# Patient Record
Sex: FEMALE | Race: WHITE | Employment: FULL TIME | URBAN - NONMETROPOLITAN AREA
[De-identification: names, ages, dates, MRNs, and addresses within clinical notes are randomized per-mention and may not be internally consistent; named-entity substitution may affect disease eponyms.]

---

## 2017-11-25 ENCOUNTER — APPOINTMENT (OUTPATIENT)
Dept: CT IMAGING | Age: 37
DRG: 244 | End: 2017-11-25
Payer: COMMERCIAL

## 2017-11-25 ENCOUNTER — HOSPITAL ENCOUNTER (INPATIENT)
Age: 37
LOS: 2 days | Discharge: HOME | DRG: 244 | End: 2017-11-27
Attending: INTERNAL MEDICINE | Admitting: INTERNAL MEDICINE
Payer: COMMERCIAL

## 2017-11-25 DIAGNOSIS — R79.89 ELEVATED LACTIC ACID LEVEL: ICD-10-CM

## 2017-11-25 DIAGNOSIS — D72.829 LEUKOCYTOSIS, UNSPECIFIED TYPE: ICD-10-CM

## 2017-11-25 DIAGNOSIS — D73.89 SPLENIC LESION: ICD-10-CM

## 2017-11-25 DIAGNOSIS — K52.9 COLITIS: Primary | ICD-10-CM

## 2017-11-25 LAB
ADENOVIRUS F 40 41 PCR: NOT DETECTED
ALBUMIN SERPL-MCNC: 4.4 G/DL (ref 3.5–5.1)
ALP BLD-CCNC: 102 U/L (ref 38–126)
ALT SERPL-CCNC: 18 U/L (ref 11–66)
AMPHETAMINE+METHAMPHETAMINE URINE SCREEN: NEGATIVE
ANION GAP SERPL CALCULATED.3IONS-SCNC: 18 MEQ/L (ref 8–16)
AST SERPL-CCNC: 20 U/L (ref 5–40)
ASTROVIRUS PCR: NOT DETECTED
BARBITURATE QUANTITATIVE URINE: NEGATIVE
BASOPHILS # BLD: 0 %
BASOPHILS ABSOLUTE: 0 THOU/MM3 (ref 0–0.1)
BENZODIAZEPINE QUANTITATIVE URINE: NEGATIVE
BILIRUB SERPL-MCNC: 0.3 MG/DL (ref 0.3–1.2)
BILIRUBIN DIRECT: < 0.2 MG/DL (ref 0–0.3)
BILIRUBIN URINE: NEGATIVE
BLOOD, URINE: NEGATIVE
BUN BLDV-MCNC: 9 MG/DL (ref 7–22)
CALCIUM SERPL-MCNC: 9.9 MG/DL (ref 8.5–10.5)
CAMPYLOBACTER PCR: NOT DETECTED
CANNABINOID QUANTITATIVE URINE: NEGATIVE
CHARACTER, URINE: CLEAR
CHLORIDE BLD-SCNC: 99 MEQ/L (ref 98–111)
CLOSTRIDIUM DIFFICILE, PCR: NOT DETECTED
CO2: 21 MEQ/L (ref 23–33)
COCAINE METABOLITE QUANTITATIVE URINE: NEGATIVE
COLOR: YELLOW
CREAT SERPL-MCNC: 0.7 MG/DL (ref 0.4–1.2)
CRYPTOSPORIDIUM PCR: NOT DETECTED
CYCLOSPORA CAYETANENSIS PCR: NOT DETECTED
DIFFERENTIAL, MANUAL: NORMAL
E COLI 0157 PCR: NORMAL
E COLI ENTEROAGGREGATIVE PCR: NOT DETECTED
E COLI ENTEROPATHOGENIC PCR: NOT DETECTED
E COLI ENTEROTOXIGENIC PCR: NOT DETECTED
E COLI SHIGA LIKE TOXIN PCR: NOT DETECTED
E COLI SHIGELLA/ENTEROINVASIVE PCR: NOT DETECTED
E HISTOLYTICA GI FILM ARRAY: NOT DETECTED
EOSINOPHIL # BLD: 2 %
EOSINOPHILS ABSOLUTE: 0.4 THOU/MM3 (ref 0–0.4)
ETHYL ALCOHOL, SERUM: < 0.01 %
GFR SERPL CREATININE-BSD FRML MDRD: > 90 ML/MIN/1.73M2
GIARDIA LAMBLIA PCR: NOT DETECTED
GLUCOSE BLD-MCNC: 97 MG/DL (ref 70–108)
GLUCOSE URINE: NEGATIVE MG/DL
HCT VFR BLD CALC: 44.8 % (ref 37–47)
HEMOGLOBIN: 15.5 GM/DL (ref 12–16)
KETONES, URINE: NEGATIVE
LACTIC ACID: 0.7 MMOL/L (ref 0.5–2.2)
LACTIC ACID: 2.7 MMOL/L (ref 0.5–2.2)
LEUKOCYTE ESTERASE, URINE: NEGATIVE
LIPASE: 23.8 U/L (ref 5.6–51.3)
LYMPHOCYTES # BLD: 25 %
LYMPHOCYTES ABSOLUTE: 5.2 THOU/MM3 (ref 1–4.8)
MCH RBC QN AUTO: 30.3 PG (ref 27–31)
MCHC RBC AUTO-ENTMCNC: 34.7 GM/DL (ref 33–37)
MCV RBC AUTO: 87.4 FL (ref 81–99)
MONOCYTES # BLD: 5 %
MONOCYTES ABSOLUTE: 1 THOU/MM3 (ref 0.4–1.3)
NITRITE, URINE: NEGATIVE
NOROVIRUS GI GII PCR: NOT DETECTED
NUCLEATED RED BLOOD CELLS: 0 /100 WBC
OPIATES, URINE: POSITIVE
OSMOLALITY CALCULATION: 274.3 MOSMOL/KG (ref 275–300)
OXYCODONE: NEGATIVE
PDW BLD-RTO: 13.1 % (ref 11.5–14.5)
PH UA: 6.5
PHENCYCLIDINE QUANTITATIVE URINE: NEGATIVE
PLATELET # BLD: 309 THOU/MM3 (ref 130–400)
PLATELET ESTIMATE: ADEQUATE
PLESIOMONAS SHIGELLOIDES PCR: NOT DETECTED
PMV BLD AUTO: 9 MCM (ref 7.4–10.4)
POTASSIUM SERPL-SCNC: 3.5 MEQ/L (ref 3.5–5.2)
PROCALCITONIN: 0.08 NG/ML (ref 0.01–0.09)
PROTEIN UA: NEGATIVE
RBC # BLD: 5.12 MILL/MM3 (ref 4.2–5.4)
ROTAVIRUS A PCR: NOT DETECTED
SALMONELLA PCR: NOT DETECTED
SAPOVIRUS PCR: NOT DETECTED
SEG NEUTROPHILS: 68 %
SEGMENTED NEUTROPHILS ABSOLUTE COUNT: 14 THOU/MM3 (ref 1.8–7.7)
SODIUM BLD-SCNC: 138 MEQ/L (ref 135–145)
SPECIFIC GRAVITY, URINE: 1.01 (ref 1–1.03)
TOTAL PROTEIN: 7.9 G/DL (ref 6.1–8)
UROBILINOGEN, URINE: 0.2 EU/DL
VIBRIO CHOLERAE PCR: NOT DETECTED
VIBRIO PCR: NOT DETECTED
WBC # BLD: 20.6 THOU/MM3 (ref 4.8–10.8)
YERSINIA ENTEROCOLITICA PCR: NOT DETECTED

## 2017-11-25 PROCEDURE — 99223 1ST HOSP IP/OBS HIGH 75: CPT | Performed by: INTERNAL MEDICINE

## 2017-11-25 PROCEDURE — 96376 TX/PRO/DX INJ SAME DRUG ADON: CPT

## 2017-11-25 PROCEDURE — 83690 ASSAY OF LIPASE: CPT

## 2017-11-25 PROCEDURE — 96361 HYDRATE IV INFUSION ADD-ON: CPT

## 2017-11-25 PROCEDURE — 6360000002 HC RX W HCPCS: Performed by: PHYSICIAN ASSISTANT

## 2017-11-25 PROCEDURE — 1200000003 HC TELEMETRY R&B

## 2017-11-25 PROCEDURE — 96375 TX/PRO/DX INJ NEW DRUG ADDON: CPT

## 2017-11-25 PROCEDURE — 80307 DRUG TEST PRSMV CHEM ANLYZR: CPT

## 2017-11-25 PROCEDURE — G0480 DRUG TEST DEF 1-7 CLASSES: HCPCS

## 2017-11-25 PROCEDURE — 36415 COLL VENOUS BLD VENIPUNCTURE: CPT

## 2017-11-25 PROCEDURE — 87045 FECES CULTURE AEROBIC BACT: CPT

## 2017-11-25 PROCEDURE — 85025 COMPLETE CBC W/AUTO DIFF WBC: CPT

## 2017-11-25 PROCEDURE — 84145 PROCALCITONIN (PCT): CPT

## 2017-11-25 PROCEDURE — 6360000004 HC RX CONTRAST MEDICATION: Performed by: PHYSICIAN ASSISTANT

## 2017-11-25 PROCEDURE — 74177 CT ABD & PELVIS W/CONTRAST: CPT

## 2017-11-25 PROCEDURE — 2580000003 HC RX 258: Performed by: INTERNAL MEDICINE

## 2017-11-25 PROCEDURE — 6360000002 HC RX W HCPCS

## 2017-11-25 PROCEDURE — 80053 COMPREHEN METABOLIC PANEL: CPT

## 2017-11-25 PROCEDURE — 82248 BILIRUBIN DIRECT: CPT

## 2017-11-25 PROCEDURE — 87040 BLOOD CULTURE FOR BACTERIA: CPT

## 2017-11-25 PROCEDURE — 81003 URINALYSIS AUTO W/O SCOPE: CPT

## 2017-11-25 PROCEDURE — 83605 ASSAY OF LACTIC ACID: CPT

## 2017-11-25 PROCEDURE — 87427 SHIGA-LIKE TOXIN AG IA: CPT

## 2017-11-25 PROCEDURE — 6360000002 HC RX W HCPCS: Performed by: INTERNAL MEDICINE

## 2017-11-25 PROCEDURE — 99285 EMERGENCY DEPT VISIT HI MDM: CPT

## 2017-11-25 PROCEDURE — 6370000000 HC RX 637 (ALT 250 FOR IP): Performed by: INTERNAL MEDICINE

## 2017-11-25 PROCEDURE — 87507 IADNA-DNA/RNA PROBE TQ 12-25: CPT

## 2017-11-25 PROCEDURE — 6370000000 HC RX 637 (ALT 250 FOR IP): Performed by: PHYSICIAN ASSISTANT

## 2017-11-25 PROCEDURE — 2580000003 HC RX 258: Performed by: PHYSICIAN ASSISTANT

## 2017-11-25 PROCEDURE — 96374 THER/PROPH/DIAG INJ IV PUSH: CPT

## 2017-11-25 RX ORDER — IBUPROFEN 200 MG
400 TABLET ORAL EVERY 6 HOURS PRN
COMMUNITY

## 2017-11-25 RX ORDER — MORPHINE SULFATE 2 MG/ML
2 INJECTION, SOLUTION INTRAMUSCULAR; INTRAVENOUS ONCE
Status: COMPLETED | OUTPATIENT
Start: 2017-11-25 | End: 2017-11-25

## 2017-11-25 RX ORDER — 0.9 % SODIUM CHLORIDE 0.9 %
500 INTRAVENOUS SOLUTION INTRAVENOUS ONCE
Status: COMPLETED | OUTPATIENT
Start: 2017-11-25 | End: 2017-11-25

## 2017-11-25 RX ORDER — SODIUM CHLORIDE 9 MG/ML
INJECTION, SOLUTION INTRAVENOUS CONTINUOUS
Status: DISCONTINUED | OUTPATIENT
Start: 2017-11-25 | End: 2017-11-27

## 2017-11-25 RX ORDER — 0.9 % SODIUM CHLORIDE 0.9 %
1000 INTRAVENOUS SOLUTION INTRAVENOUS ONCE
Status: COMPLETED | OUTPATIENT
Start: 2017-11-25 | End: 2017-11-25

## 2017-11-25 RX ORDER — MORPHINE SULFATE 4 MG/ML
4 INJECTION, SOLUTION INTRAMUSCULAR; INTRAVENOUS ONCE
Status: COMPLETED | OUTPATIENT
Start: 2017-11-25 | End: 2017-11-25

## 2017-11-25 RX ORDER — ONDANSETRON 2 MG/ML
4 INJECTION INTRAMUSCULAR; INTRAVENOUS EVERY 6 HOURS PRN
Status: DISCONTINUED | OUTPATIENT
Start: 2017-11-25 | End: 2017-11-27 | Stop reason: HOSPADM

## 2017-11-25 RX ORDER — ACETAMINOPHEN 325 MG/1
650 TABLET ORAL EVERY 4 HOURS PRN
Status: DISCONTINUED | OUTPATIENT
Start: 2017-11-25 | End: 2017-11-27 | Stop reason: HOSPADM

## 2017-11-25 RX ORDER — SODIUM CHLORIDE 0.9 % (FLUSH) 0.9 %
10 SYRINGE (ML) INJECTION EVERY 12 HOURS SCHEDULED
Status: DISCONTINUED | OUTPATIENT
Start: 2017-11-25 | End: 2017-11-27 | Stop reason: HOSPADM

## 2017-11-25 RX ORDER — MORPHINE SULFATE 2 MG/ML
4 INJECTION, SOLUTION INTRAMUSCULAR; INTRAVENOUS
Status: ACTIVE | OUTPATIENT
Start: 2017-11-25 | End: 2017-11-26

## 2017-11-25 RX ORDER — ONDANSETRON 2 MG/ML
4 INJECTION INTRAMUSCULAR; INTRAVENOUS ONCE
Status: COMPLETED | OUTPATIENT
Start: 2017-11-25 | End: 2017-11-25

## 2017-11-25 RX ORDER — METRONIDAZOLE 500 MG/1
500 TABLET ORAL EVERY 8 HOURS SCHEDULED
Status: DISCONTINUED | OUTPATIENT
Start: 2017-11-25 | End: 2017-11-27 | Stop reason: HOSPADM

## 2017-11-25 RX ORDER — MORPHINE SULFATE 2 MG/ML
2 INJECTION, SOLUTION INTRAMUSCULAR; INTRAVENOUS
Status: DISPENSED | OUTPATIENT
Start: 2017-11-25 | End: 2017-11-26

## 2017-11-25 RX ORDER — LORAZEPAM 2 MG/ML
0.5 INJECTION INTRAMUSCULAR ONCE
Status: COMPLETED | OUTPATIENT
Start: 2017-11-25 | End: 2017-11-25

## 2017-11-25 RX ORDER — ONDANSETRON 2 MG/ML
INJECTION INTRAMUSCULAR; INTRAVENOUS
Status: COMPLETED
Start: 2017-11-25 | End: 2017-11-25

## 2017-11-25 RX ORDER — SODIUM CHLORIDE 0.9 % (FLUSH) 0.9 %
10 SYRINGE (ML) INJECTION PRN
Status: DISCONTINUED | OUTPATIENT
Start: 2017-11-25 | End: 2017-11-27 | Stop reason: HOSPADM

## 2017-11-25 RX ORDER — CIPROFLOXACIN 2 MG/ML
400 INJECTION, SOLUTION INTRAVENOUS EVERY 12 HOURS
Status: DISCONTINUED | OUTPATIENT
Start: 2017-11-25 | End: 2017-11-27

## 2017-11-25 RX ADMIN — CIPROFLOXACIN 400 MG: 2 INJECTION, SOLUTION INTRAVENOUS at 17:54

## 2017-11-25 RX ADMIN — SODIUM CHLORIDE 500 ML: 9 INJECTION, SOLUTION INTRAVENOUS at 18:00

## 2017-11-25 RX ADMIN — SODIUM CHLORIDE: 9 INJECTION, SOLUTION INTRAVENOUS at 17:54

## 2017-11-25 RX ADMIN — MORPHINE SULFATE 4 MG: 4 INJECTION, SOLUTION INTRAMUSCULAR; INTRAVENOUS at 11:07

## 2017-11-25 RX ADMIN — MORPHINE SULFATE 2 MG: 2 INJECTION, SOLUTION INTRAMUSCULAR; INTRAVENOUS at 14:37

## 2017-11-25 RX ADMIN — SODIUM CHLORIDE: 9 INJECTION, SOLUTION INTRAVENOUS at 23:56

## 2017-11-25 RX ADMIN — LORAZEPAM 0.5 MG: 2 INJECTION INTRAMUSCULAR; INTRAVENOUS at 11:08

## 2017-11-25 RX ADMIN — METRONIDAZOLE 500 MG: 500 TABLET, FILM COATED ORAL at 23:55

## 2017-11-25 RX ADMIN — ONDANSETRON 4 MG: 2 INJECTION INTRAMUSCULAR; INTRAVENOUS at 11:05

## 2017-11-25 RX ADMIN — Medication 15 ML: at 11:10

## 2017-11-25 RX ADMIN — TAZOBACTAM SODIUM AND PIPERACILLIN SODIUM 3.38 G: 375; 3 INJECTION, SOLUTION INTRAVENOUS at 15:02

## 2017-11-25 RX ADMIN — ENOXAPARIN SODIUM 40 MG: 40 INJECTION SUBCUTANEOUS at 17:58

## 2017-11-25 RX ADMIN — SODIUM CHLORIDE 500 ML: 9 INJECTION, SOLUTION INTRAVENOUS at 11:10

## 2017-11-25 RX ADMIN — IOPAMIDOL 80 ML: 755 INJECTION, SOLUTION INTRAVENOUS at 13:15

## 2017-11-25 ASSESSMENT — PAIN SCALES - GENERAL
PAINLEVEL_OUTOF10: 4
PAINLEVEL_OUTOF10: 10
PAINLEVEL_OUTOF10: 1
PAINLEVEL_OUTOF10: 10
PAINLEVEL_OUTOF10: 2
PAINLEVEL_OUTOF10: 2

## 2017-11-25 ASSESSMENT — ENCOUNTER SYMPTOMS
SHORTNESS OF BREATH: 0
VOMITING: 0
RHINORRHEA: 0
BACK PAIN: 0
EYE DISCHARGE: 0
DIARRHEA: 1
SORE THROAT: 0
ABDOMINAL PAIN: 1
BLOOD IN STOOL: 1
WHEEZING: 0
EYE PAIN: 0
COUGH: 0
NAUSEA: 0

## 2017-11-25 ASSESSMENT — PAIN DESCRIPTION - DESCRIPTORS
DESCRIPTORS: CRAMPING
DESCRIPTORS: CRAMPING

## 2017-11-25 ASSESSMENT — PAIN DESCRIPTION - LOCATION
LOCATION: ABDOMEN
LOCATION: ABDOMEN

## 2017-11-25 ASSESSMENT — PAIN DESCRIPTION - PROGRESSION
CLINICAL_PROGRESSION: GRADUALLY IMPROVING
CLINICAL_PROGRESSION: GRADUALLY IMPROVING

## 2017-11-25 ASSESSMENT — PAIN DESCRIPTION - FREQUENCY
FREQUENCY: CONTINUOUS
FREQUENCY: CONTINUOUS

## 2017-11-25 ASSESSMENT — PAIN DESCRIPTION - PAIN TYPE
TYPE: ACUTE PAIN
TYPE: ACUTE PAIN

## 2017-11-25 ASSESSMENT — PAIN DESCRIPTION - ORIENTATION
ORIENTATION: RIGHT;LEFT;UPPER
ORIENTATION: RIGHT;LEFT;UPPER

## 2017-11-25 NOTE — FLOWSHEET NOTE
1700 Mrs Adriel Roy transferred to CCU from the ER. She reports feeling much better. She was admitted. She has had a loose BM. A specimen was sent to the lab. Her daughter is at the bedside. 1800 She continues to rest without complaints. Continue IV fluids and ABX.

## 2017-11-25 NOTE — ED NOTES
Patient O2 sat 87% on RA. 2L NC applied. O2 sat 95%. Patient tolerating well. [facility].         Jonah Calixto RN  11/25/17 1214

## 2017-11-25 NOTE — ED NOTES
Patient resting quietly in cot with  at bedside and drinking oral contrast.  Rates pain 1/10. Will continue to monitor.       Francia Tuttle RN  11/25/17 1768

## 2017-11-25 NOTE — ED PROVIDER NOTES
[facility]  eMERGENCY dEPARTMENT eNCOUnter          CHIEF COMPLAINT       Chief Complaint   Patient presents with    Abdominal Pain       Nurses Notes reviewed and I agree except as noted in the HPI. HISTORY OF PRESENT ILLNESS    April D Adriel Roy is a 40 y.o. female who presents to the Emergency Department for the evaluation of upper abdominal pain. Patient states the pain started last night while she was in bed at 2200 and has gotten progressively worse, now rating the pain as 10/10 in severity. She states the pain is constant with intermittent worsening of severe pain for a few seconds per minute. Patient also has diarrhea with hematochezia. Patient denies nausea, vomiting, fever, or chest pain. Patient took Ibuprofen with no improvement. She has history of hysterectomy, ovaries are intact. Denies anticoagulant use. No history of similar symptoms. No recent sick contacts. The HPI was provided by the patient. REVIEW OF SYSTEMS     Review of Systems   Constitutional: Negative for appetite change, chills, fatigue and fever. HENT: Negative for congestion, ear pain, rhinorrhea and sore throat. Eyes: Negative for pain, discharge and visual disturbance. Respiratory: Negative for cough, shortness of breath and wheezing. Cardiovascular: Negative for chest pain, palpitations and leg swelling. Gastrointestinal: Positive for abdominal pain, blood in stool and diarrhea. Negative for nausea and vomiting. Genitourinary: Negative for difficulty urinating, dysuria and vaginal discharge. Musculoskeletal: Negative for arthralgias, back pain, joint swelling and neck pain. Skin: Negative for pallor and rash. Neurological: Negative for dizziness, syncope, weakness, light-headedness and headaches. Hematological: Negative for adenopathy. Psychiatric/Behavioral: Negative for confusion, dysphoric mood and suicidal ideas. The patient is not nervous/anxious.         PAST MEDICAL HISTORY Abnormal; Notable for the following:        Result Value    WBC 20.6 (*)     Segs Absolute 14.0 (*)     Lymphocytes # 5.2 (*)     All other components within normal limits   BASIC METABOLIC PANEL - Abnormal; Notable for the following:     CO2 21 (*)     All other components within normal limits   LACTIC ACID, PLASMA - Abnormal; Notable for the following:     Lactic Acid 2.7 (*)     All other components within normal limits   ANION GAP - Abnormal; Notable for the following: Anion Gap 18.0 (*)     All other components within normal limits   OSMOLALITY - Abnormal; Notable for the following:     Osmolality Calc 274.3 (*)     All other components within normal limits   GASTROINTESTINAL PANEL BY DNA   CULTURE BLOOD #1   CULTURE BLOOD #2   HEPATIC FUNCTION PANEL   LIPASE   URINE DRUG SCREEN   URINE RT REFLEX TO CULTURE   ETHANOL   MANUAL DIFFERENTIAL   GLOMERULAR FILTRATION RATE, ESTIMATED   LACTIC ACID, PLASMA   PROCALCITONIN       EMERGENCY DEPARTMENT COURSE:   Vitals:    Vitals:    11/25/17 1051 11/25/17 1155 11/25/17 1311 11/25/17 1408   BP:  109/76  110/82   Pulse:  87 97 82   Resp:  16 16 18   Temp: 98.8 °F (37.1 °C)      TempSrc: Oral      SpO2:  97% 97% 96%   Weight: 174 lb (78.9 kg)      Height: 5' 4\" (1.626 m)          Patient presents for severe upper abdominal pain. She is tachycardic upon arrival.  She is clearly in quite a bit of discomfort. She is afebrile. White blood cell count noted to be 20,000. Lactic acid elevated at 2.7. She is treated initially with IV fluids, morphine, Zofran, GI cocktail and small dose of Ativan with significant improvement in her pain. Was given morphine for some breakthrough pain later in her stay. CT does reveal suggestion of colitis as well as incidentally noted splenic lesion, appears similar in size in comparison to previous exam from April 2016. Follow-up nonemergent MRI recommended for the splenic lesion.   It does note some ovarian cysts as well, but again patient did not complain of or exhibit lower abdominal tenderness on examination. Does not clinically appear consistent with ovarian torsion. I do feel admission for some IV antibiotics and pain control is indicated and patient is agreeable with this plan. I discussed the case with the hospitalist service will admit her for further management and care. CRITICAL CARE:   None     CONSULTS:  Dr. Reinaldo Talley, hospitalist    PROCEDURES:  None    FINAL IMPRESSION      1. Colitis    2. Leukocytosis, unspecified type    3. Elevated lactic acid level    4. Splenic lesion          DISPOSITION/PLAN   admit    PATIENT REFERRED TO:  No follow-up provider specified. DISCHARGE MEDICATIONS:  New Prescriptions    No medications on file       (Please note that portions of this note were completed with a voice recognition program.  Efforts were made to edit the dictations but occasionally words are mis-transcribed.)    The patient was given an opportunity to see the Emergency Attending. The patient voiced understanding that I was a Mid-Level Provider and was in agreement with being seen independently by myself. Scribe:  Asia Buchanan 11/25/17 10:56 AM Scribing for and in the presence of Benton Harrison. Signed by: Parveen Stuart, 11/25/17 2:40 PM    Provider:  I personally performed the services described in the documentation, reviewed and edited the documentation which was dictated to the scribe in my presence, and it accurately records my words and actions.     Benton Harrison 11/25/17 2:40 PM        Elida Main PA-C  11/25/17 4770

## 2017-11-25 NOTE — PLAN OF CARE
Problem: Discharge Planning:  Goal: Discharged to appropriate level of care  Discharged to appropriate level of care  Outcome: Ongoing  Plans to be discharged home    Problem: Gas Exchange - Impaired:  Goal: Levels of oxygenation will improve  Levels of oxygenation will improve  Outcome: Ongoing      Problem: Infection, Septic Shock:  Goal: Will show no infection signs and symptoms  Will show no infection signs and symptoms  Outcome: Ongoing  Afebrile since admission. Problem: Tissue Perfusion, Altered:  Goal: Circulatory function within specified parameters  Circulatory function within specified parameters  Outcome: Ongoing  VS remain stable.

## 2017-11-25 NOTE — H&P
History & Physical        Patient:  April D Bentley Bell  YOB: 1980    MRN: [id number]     Acct: [de-identified]     PCP: Marianela Thomas NP    Date of Admission: 11/25/2017    Date of Service: Pt seen/examined on 11/25/2017 and Admitted to Inpatient with expected LOS greater than two midnights due to medical therapy. Chief Complaint:  Abdominal pain      History Of Present Illness:      40 y.o. female who presented to [facility] with abdominal pain. Started this morning out of the blue. Felt fine yesterday. Started having some nausea, no vomiting. Then started having diarrhea, with blood. No fevers or chills. No change in diet, did not eat out. History of diverticulitis. CT scan shows colitis. Elevated white count. Elevated lactic acid    Past Medical History:          Diagnosis Date    Asthma     MDRO (multiple drug resistant organisms) resistance 2013    left great toe       Past Surgical History:          Procedure Laterality Date    APPENDECTOMY      DILATION AND CURETTAGE OF UTERUS      ENDOMETRIAL ABLATION  08/13/14    with hysteroscopy, D&C    HYSTERECTOMY      TUBAL LIGATION         Medications Prior to Admission:      Prior to Admission medications    Not on File       Allergies:  Aspirin    Social History:      The patient currently lives at home    TOBACCO:   reports that she has been smoking Cigarettes. She has a 10.00 pack-year smoking history. She has never used smokeless tobacco.  ETOH:   reports that she does not drink alcohol. Family History:       Reviewed in detail and negative for DM, CAD, Cancer, CVA. Positive as follows:        Problem Relation Age of Onset    Lung Cancer Maternal Grandmother 79       Diet:       REVIEW OF SYSTEMS:   Pertinent positives as noted in the HPI. All other systems reviewed and negative.     PHYSICAL EXAM:    /86   Pulse 79   Temp 98.8 °F (37.1 °C) (Oral)   Resp 16   Ht 5' 4\" (1.626 m)   Wt 174 lb (78.9 kg)   LMP 08/17/2015   SpO2 95%   BMI 29.87 kg/m²     General appearance:  No apparent distress, appears stated age and cooperative. HEENT:  Normal cephalic, atraumatic without obvious deformity. Pupils equal, round, and reactive to light. Extra ocular muscles intact. Conjunctivae/corneas clear. Neck: Supple, with full range of motion. No jugular venous distention. Trachea midline. Respiratory:  Normal respiratory effort. Clear to auscultation, bilaterally without Rales/Wheezes/Rhonchi. Cardiovascular:  Regular rate and rhythm with normal S1/S2 without murmurs, rubs or gallops. Abdomen: Soft, non-tender, non-distended with normal bowel sounds. Musculoskeletal:  No clubbing, cyanosis or edema bilaterally. Full range of motion without deformity. Skin: Skin color, texture, turgor normal.  No rashes or lesions. Neurologic:  Neurovascularly intact without any focal sensory/motor deficits. Cranial nerves: II-XII intact, grossly non-focal.  Psychiatric:  Alert and oriented, thought content appropriate, normal insight  Capillary Refill: Brisk,< 3 seconds   Peripheral Pulses: +2 palpable, equal bilaterally       Labs:     Recent Labs      11/25/17   1108   WBC  20.6*   HGB  15.5   HCT  44.8   PLT  309     Recent Labs      11/25/17   1108   NA  138   K  3.5   CL  99   CO2  21*   BUN  9   CREATININE  0.7   CALCIUM  9.9     Recent Labs      11/25/17   1108   AST  20   ALT  18   BILIDIR  <0.2   BILITOT  0.3   ALKPHOS  102     No results for input(s): INR in the last 72 hours. No results for input(s): Peggyann Sing in the last 72 hours. Urinalysis:      Lab Results   Component Value Date    NITRU NEGATIVE 11/25/2017    WBCUA 5-10 10/04/2015    BACTERIA NONE 10/04/2015    RBCUA 5-10 10/04/2015    BLOODU NEGATIVE 11/25/2017    SPECGRAV 1.004 08/24/2015    GLUCOSEU NEGATIVE 11/25/2017       Radiology:       CT ABDOMEN PELVIS W IV CONTRAST Additional Contrast? Oral (4 dose)   Final Result   1.  The colonic bowel wall

## 2017-11-25 NOTE — ED NOTES
Pt admitted to hospital. Transported to floor by cart in stable condition       Elsi Moore, LPN  09/12/07 7826

## 2017-11-26 LAB
ALBUMIN SERPL-MCNC: 3.5 G/DL (ref 3.5–5.1)
ALP BLD-CCNC: 77 U/L (ref 38–126)
ALT SERPL-CCNC: 12 U/L (ref 11–66)
ANION GAP SERPL CALCULATED.3IONS-SCNC: 13 MEQ/L (ref 8–16)
AST SERPL-CCNC: 14 U/L (ref 5–40)
BASOPHILS # BLD: 1 %
BASOPHILS ABSOLUTE: 0.1 THOU/MM3 (ref 0–0.1)
BILIRUB SERPL-MCNC: < 0.2 MG/DL (ref 0.3–1.2)
BUN BLDV-MCNC: 8 MG/DL (ref 7–22)
CALCIUM SERPL-MCNC: 8.2 MG/DL (ref 8.5–10.5)
CHLORIDE BLD-SCNC: 101 MEQ/L (ref 98–111)
CO2: 26 MEQ/L (ref 23–33)
CREAT SERPL-MCNC: 0.7 MG/DL (ref 0.4–1.2)
EOSINOPHIL # BLD: 4.1 %
EOSINOPHILS ABSOLUTE: 0.6 THOU/MM3 (ref 0–0.4)
GFR SERPL CREATININE-BSD FRML MDRD: > 90 ML/MIN/1.73M2
GLUCOSE BLD-MCNC: 98 MG/DL (ref 70–108)
HCT VFR BLD CALC: 37.3 % (ref 37–47)
HEMOGLOBIN: 13.1 GM/DL (ref 12–16)
LYMPHOCYTES # BLD: 29.9 %
LYMPHOCYTES ABSOLUTE: 4.3 THOU/MM3 (ref 1–4.8)
MCH RBC QN AUTO: 30.9 PG (ref 27–31)
MCHC RBC AUTO-ENTMCNC: 35 GM/DL (ref 33–37)
MCV RBC AUTO: 88.2 FL (ref 81–99)
MONOCYTES # BLD: 4.2 %
MONOCYTES ABSOLUTE: 0.6 THOU/MM3 (ref 0.4–1.3)
NUCLEATED RED BLOOD CELLS: 0 /100 WBC
PDW BLD-RTO: 13.3 % (ref 11.5–14.5)
PLATELET # BLD: 233 THOU/MM3 (ref 130–400)
PMV BLD AUTO: 9.1 MCM (ref 7.4–10.4)
POTASSIUM SERPL-SCNC: 3.4 MEQ/L (ref 3.5–5.2)
RBC # BLD: 4.24 MILL/MM3 (ref 4.2–5.4)
SEG NEUTROPHILS: 60.8 %
SEGMENTED NEUTROPHILS ABSOLUTE COUNT: 8.7 THOU/MM3 (ref 1.8–7.7)
SODIUM BLD-SCNC: 140 MEQ/L (ref 135–145)
TOTAL PROTEIN: 6.2 G/DL (ref 6.1–8)
WBC # BLD: 14.3 THOU/MM3 (ref 4.8–10.8)

## 2017-11-26 PROCEDURE — 90686 IIV4 VACC NO PRSV 0.5 ML IM: CPT | Performed by: INTERNAL MEDICINE

## 2017-11-26 PROCEDURE — 6360000002 HC RX W HCPCS: Performed by: INTERNAL MEDICINE

## 2017-11-26 PROCEDURE — 99232 SBSQ HOSP IP/OBS MODERATE 35: CPT | Performed by: HOSPITALIST

## 2017-11-26 PROCEDURE — 80053 COMPREHEN METABOLIC PANEL: CPT

## 2017-11-26 PROCEDURE — 6370000000 HC RX 637 (ALT 250 FOR IP): Performed by: INTERNAL MEDICINE

## 2017-11-26 PROCEDURE — 2580000003 HC RX 258: Performed by: INTERNAL MEDICINE

## 2017-11-26 PROCEDURE — 36415 COLL VENOUS BLD VENIPUNCTURE: CPT

## 2017-11-26 PROCEDURE — G0008 ADMIN INFLUENZA VIRUS VAC: HCPCS | Performed by: INTERNAL MEDICINE

## 2017-11-26 PROCEDURE — 85025 COMPLETE CBC W/AUTO DIFF WBC: CPT

## 2017-11-26 PROCEDURE — A6198 ALGINATE DRESSING > 48 SQ IN: HCPCS

## 2017-11-26 PROCEDURE — 1200000003 HC TELEMETRY R&B

## 2017-11-26 RX ADMIN — METRONIDAZOLE 500 MG: 500 TABLET, FILM COATED ORAL at 05:42

## 2017-11-26 RX ADMIN — CIPROFLOXACIN 400 MG: 2 INJECTION, SOLUTION INTRAVENOUS at 17:29

## 2017-11-26 RX ADMIN — INFLUENZA A VIRUS A/SINGAPORE/GP1908/2015 IVR-180A (H1N1) ANTIGEN (PROPIOLACTONE INACTIVATED), INFLUENZA A VIRUS A/HONG KONG/4801/2014 X-263B (H3N2) ANTIGEN (PROPIOLACTONE INACTIVATED), INFLUENZA B VIRUS B/BRISBANE/46/2015 ANTIGEN (PROPIOLACTONE INACTIVATED), AND INFLUENZA B VIRUS B/PHUKET/3073/2013 BVR-1B ANTIGEN (PROPIOLACTONE INACTIVATED) 0.5 ML: 15; 15; 15; 15 INJECTION, SUSPENSION INTRAMUSCULAR at 21:00

## 2017-11-26 RX ADMIN — MORPHINE SULFATE 2 MG: 2 INJECTION, SOLUTION INTRAMUSCULAR; INTRAVENOUS at 04:04

## 2017-11-26 RX ADMIN — METRONIDAZOLE 500 MG: 500 TABLET, FILM COATED ORAL at 22:29

## 2017-11-26 RX ADMIN — Medication 10 ML: at 21:00

## 2017-11-26 RX ADMIN — METRONIDAZOLE 500 MG: 500 TABLET, FILM COATED ORAL at 14:08

## 2017-11-26 RX ADMIN — CIPROFLOXACIN 400 MG: 2 INJECTION, SOLUTION INTRAVENOUS at 05:42

## 2017-11-26 ASSESSMENT — PAIN SCALES - GENERAL
PAINLEVEL_OUTOF10: 6
PAINLEVEL_OUTOF10: 6
PAINLEVEL_OUTOF10: 2

## 2017-11-26 ASSESSMENT — PAIN DESCRIPTION - ORIENTATION: ORIENTATION: UPPER

## 2017-11-26 ASSESSMENT — PAIN DESCRIPTION - PROGRESSION
CLINICAL_PROGRESSION: NOT CHANGED

## 2017-11-26 ASSESSMENT — PAIN DESCRIPTION - FREQUENCY: FREQUENCY: INTERMITTENT

## 2017-11-26 ASSESSMENT — PAIN DESCRIPTION - LOCATION: LOCATION: ABDOMEN

## 2017-11-26 ASSESSMENT — PAIN DESCRIPTION - ONSET: ONSET: ON-GOING

## 2017-11-26 ASSESSMENT — PAIN DESCRIPTION - DESCRIPTORS: DESCRIPTORS: CRAMPING

## 2017-11-26 ASSESSMENT — PAIN DESCRIPTION - PAIN TYPE: TYPE: ACUTE PAIN

## 2017-11-26 NOTE — PLAN OF CARE
Problem: Discharge Planning:  Goal: Discharged to appropriate level of care  Discharged to appropriate level of care   Outcome: Ongoing  Plans to discharge home with  when well. Problem: Gas Exchange - Impaired:  Goal: Levels of oxygenation will improve  Levels of oxygenation will improve   Outcome: Ongoing  Oxygen saturations at 94-96% on room air. Problem: Infection, Septic Shock:  Goal: Will show no infection signs and symptoms  Will show no infection signs and symptoms   Outcome: Ongoing  WBC 20.6, Lactic 2.7 now 0.7 (false high?). Receiving Cipro and Flagyl    Problem: Infection:  Goal: Will remain free from infection  Will remain free from infection  Outcome: Ongoing  Continue to monitor labs, no new signs or symptoms of infection. Problem: Safety:  Goal: Free from accidental physical injury  Free from accidental physical injury  Outcome: Ongoing  Patient remains free of falls this shift. Siderails up x2, call light within reach, bed in lowest position, bed alarm on. Frequent rounding. Problem: Daily Care:  Goal: Daily care needs are met  Daily care needs are met  Outcome: Ongoing  Needs are met, patient fulfills most of her physical needs on her own. Problem: Cardiovascular  Goal: No DVT, peripheral vascular complications  Outcome: Ongoing  No signs or symptoms of DVT this shift, will continue to monitor. Goal: Hemodynamic stability  Outcome: Ongoing  VS stable, WBC 20.6 and started on Flagyl and Cipro. Problem: GI  Goal: No bowel complications  Outcome: Ongoing  2 loose stools so far this shift. Continue to monitor. Problem: Skin Integrity/Risk  Goal: No skin breakdown during hospitalization  Outcome: Ongoing  No new signs of skin breakdown this shift. Comments: Care plan reviewed with patient and daughter Yvette. Patient and daughter Kathryn Coronado verbalize understanding of the plan of care and contribute to goal setting.     Electronically signed by Shonda Painter RN on 11/26/2017 at 1:32 AM

## 2017-11-26 NOTE — PROGRESS NOTES
Progress Note    Patient:  April D Suraj    Unit/Bed:4K-02/002-A  YOB: 1980  MRN: [id number]   Acct: [de-identified]   Admit date: 11/25/2017      Active Problems:    Colitis      Assessment and Plan:  1. Acute diverticulitis: Improving, continue with Cipro and flagyl. Advance diet to full liquid. 2. VTE prophylaxis: Lovenox      Patient Seen, Chart, Consults notes, Labs, Radiology studies reviewed. Subjective: Day 1 of stay with complaints of acute diverticulitis  Patient seen and examined at bedside, states abdominal pain is improving, would like to advance diet. No nausea or vomiting. All other ROS negative except noted in HPI    Past, Family, Social History unchanged from admission. Diet:  DIET FULL LIQUID;    Medications:  Scheduled Meds:   sodium chloride flush  10 mL Intravenous 2 times per day    enoxaparin  40 mg Subcutaneous Q24H    ciprofloxacin  400 mg Intravenous Q12H    metroNIDAZOLE  500 mg Oral 3 times per day    influenza virus vaccine  0.5 mL Intramuscular Once     Continuous Infusions:   sodium chloride 100 mL/hr at 11/25/17 2356     PRN Meds:sodium chloride flush, acetaminophen, magnesium hydroxide, ondansetron, morphine **OR** morphine    Objective:  CBC:   Recent Labs      11/25/17   1108  11/26/17   0521   WBC  20.6*  14.3*   HGB  15.5  13.1   PLT  309  233     BMP:  Recent Labs      11/25/17   1108  11/26/17   0521   NA  138  140   K  3.5  3.4*   CL  99  101   CO2  21*  26   BUN  9  8   CREATININE  0.7  0.7   GLUCOSE  97  98     Calcium:  Recent Labs      11/26/17   0521   CALCIUM  8.2*     Ionized Calcium:No results for input(s): IONCA in the last 72 hours. Magnesium:No results for input(s): MG in the last 72 hours. Phosphorus:No results for input(s): PHOS in the last 72 hours. BNP:No results for input(s): BNP in the last 72 hours. Glucose:No results for input(s): POCGLU in the last 72 hours.   HgbA1C: No results for input(s): LABA1C in the last 72 hours.  INR: No results for input(s): INR in the last 72 hours. Hepatic: Recent Labs      11/25/17   1108  11/26/17   0521   ALKPHOS  102  77   ALT  18  12   AST  20  14   PROT  7.9  6.2   BILITOT  0.3  <0.2*   BILIDIR  <0.2   --    LABALBU  4.4  3.5     Amylase and Lipase:  Recent Labs      11/25/17   1425   LACTA  0.7     Lactic Acid:   Recent Labs      11/25/17   1425   LACTA  0.7     Troponin: No results for input(s): CKTOTAL, CKMB, TROPONINT in the last 72 hours. BNP: No results for input(s): BNP in the last 72 hours. Lipids: No results for input(s): CHOL, TRIG, HDL, LDLCALC in the last 72 hours. Invalid input(s): LDL  ABGs: No results found for: PH, PCO2, PO2, HCO3, O2SAT        Radiology reports as per the Radiologist  Radiology: Ct Abdomen Pelvis W Iv Contrast Additional Contrast? Oral (4 Dose)    Result Date: 11/25/2017  PROCEDURE: CT ABDOMEN PELVIS W IV CONTRAST CLINICAL INFORMATION: upper abdominal pain COMPARISON: 4/2/2016 TECHNIQUE:  Axial CT images were obtained through the abdomen and pelvis following the intravenous administration of 80 mL Isovue 370 contrast and oral contrast. Coronal and sagittal reformatted images were rendered. All CT scans at this facility use dose modulation, iterative reconstruction, and/or weight-based dosing when appropriate to reduce radiation dose to as low as reasonably achievable. FINDINGS: Limited evaluation of the lung bases demonstrates mild dependent bibasilar atelectasis. The liver, gallbladder, pancreas and adrenal glands appear normal. There is a low-density lesion demonstrated within the spleen which is incompletely characterized. This measures approximately 56 Hounsfield units. This measures 2.2 x 1.5 cm. Previously this measured approximately 2.1 x 1.6 cm in April 2016. The kidneys enhance normally bilaterally. No hydronephrosis is seen. No hydroureter is demonstrated. No nephrolithiasis or ureterolithiasis is seen.  There is no evidence of bowel obstruction. The appendix is not visualized. Patient is status post prior hysterectomy. There are bilateral cystic lesions within the bilateral adnexa measuring approximately 4.3 x 3 cm on the right and 4.8 x 2.7 cm in the left. This may represent functional ovarian cysts but are incompletely characterized. No free fluid or free air is seen. No lymphadenopathy is demonstrated. The colonic bowel wall thickness appears mildly diffusely prominent. This is nonspecific and may be related to incomplete distention. However, cannot exclude a subtle inflammatory or infectious colitis. Although no significant perienteric inflammatory changes seen. 1. The colonic bowel wall thickness appears mildly diffusely prominent. This is nonspecific and may be related to incomplete distention. However, cannot exclude a subtle inflammatory or infectious colitis. Although no significant perienteric inflammatory  changes seen. 2. There are bilateral cystic lesions within the bilateral adnexa measuring approximately 4.3 x 3 cm on the right and 4.8 x 2.7 cm in the left. This may represent functional ovarian cysts but are incompletely characterized. 3. There is a 2.2 x 1.5 cm low-density lesion within the spleen. This appears similar in size when compared to prior examination from April 2016. This is incompletely characterized on the current examination. Follow-up nonemergent MRI with and without contrast obtained for further characterization as clinically indicated. **This report has been created using voice recognition software. It may contain minor errors which are inherent in voice recognition technology. ** Final report electronically signed by Dr. Bailey Jones on 11/25/2017 1:51 PM        Physical Exam:  Vitals: BP (!) 99/56   Pulse 78   Temp 99.2 °F (37.3 °C) (Oral)   Resp 18   Ht 5' 4\" (1.626 m)   Wt 177 lb 8 oz (80.5 kg)   LMP 08/17/2015   SpO2 94%   BMI 30.47 kg/m²   24 hour intake/output:  Intake/Output Summary (Last 24

## 2017-11-26 NOTE — PLAN OF CARE
Problem: Discharge Planning:  Goal: Discharged to appropriate level of care  Discharged to appropriate level of care   Outcome: Ongoing  Plan to discharge home when medically stable. Problem: Gas Exchange - Impaired:  Goal: Levels of oxygenation will improve  Levels of oxygenation will improve   Outcome: Met This Shift      Problem: Infection, Septic Shock:  Goal: Will show no infection signs and symptoms  Will show no infection signs and symptoms   Outcome: Met This Shift  Afebrile, lactic normal.     Problem: Tissue Perfusion, Altered:  Goal: Circulatory function within specified parameters  Circulatory function within specified parameters   Outcome: Met This Shift      Problem: Infection:  Goal: Will remain free from infection  Will remain free from infection   Outcome: Ongoing  No signs of infection so far this shift. Problem: Daily Care:  Goal: Daily care needs are met  Daily care needs are met   Outcome: Ongoing  Daily care needs met so far this shift. Will continue to monitor. Problem: Cardiovascular  Goal: No DVT, peripheral vascular complications  Outcome: Ongoing  No signs of DVT for this shift. Anticoagulation medication given per orders. Goal: Hemodynamic stability  Outcome: Met This Shift      Problem: GI  Goal: No bowel complications  Outcome: Ongoing  No BM so far this shift. Will continue to monitor. Problem: Skin Integrity/Risk  Goal: No skin breakdown during hospitalization  Outcome: Ongoing  No new skin issues for this shift. Patient turned q2h as recommended with pillow support. Problem: Pain:  Goal: Pain level will decrease  Pain level will decrease   Outcome: Ongoing  No pain so far this shift. Will continue to monitor. Comments: Care plan reviewed with patient. Patient verbalize understanding of the plan of care and contribute to goal setting.

## 2017-11-27 VITALS
DIASTOLIC BLOOD PRESSURE: 67 MMHG | HEIGHT: 64 IN | RESPIRATION RATE: 16 BRPM | WEIGHT: 178 LBS | OXYGEN SATURATION: 96 % | HEART RATE: 81 BPM | BODY MASS INDEX: 30.39 KG/M2 | SYSTOLIC BLOOD PRESSURE: 110 MMHG | TEMPERATURE: 98.1 F

## 2017-11-27 LAB
ANION GAP SERPL CALCULATED.3IONS-SCNC: 12 MEQ/L (ref 8–16)
BASOPHILS # BLD: 0.1 %
BASOPHILS ABSOLUTE: 0 THOU/MM3 (ref 0–0.1)
BUN BLDV-MCNC: 6 MG/DL (ref 7–22)
CALCIUM SERPL-MCNC: 8.6 MG/DL (ref 8.5–10.5)
CHLORIDE BLD-SCNC: 102 MEQ/L (ref 98–111)
CO2: 25 MEQ/L (ref 23–33)
CREAT SERPL-MCNC: 0.8 MG/DL (ref 0.4–1.2)
CULTURE, STOOL: NORMAL
EOSINOPHIL # BLD: 4.2 %
EOSINOPHILS ABSOLUTE: 0.6 THOU/MM3 (ref 0–0.4)
GFR SERPL CREATININE-BSD FRML MDRD: 81 ML/MIN/1.73M2
GLUCOSE BLD-MCNC: 76 MG/DL (ref 70–108)
HCT VFR BLD CALC: 37.5 % (ref 37–47)
HEMOGLOBIN: 12.9 GM/DL (ref 12–16)
LYMPHOCYTES # BLD: 14.5 %
LYMPHOCYTES ABSOLUTE: 2 THOU/MM3 (ref 1–4.8)
MCH RBC QN AUTO: 30.2 PG (ref 27–31)
MCHC RBC AUTO-ENTMCNC: 34.6 GM/DL (ref 33–37)
MCV RBC AUTO: 87.4 FL (ref 81–99)
MONOCYTES # BLD: 4.9 %
MONOCYTES ABSOLUTE: 0.7 THOU/MM3 (ref 0.4–1.3)
NUCLEATED RED BLOOD CELLS: 0 /100 WBC
PDW BLD-RTO: 13.4 % (ref 11.5–14.5)
PLATELET # BLD: 244 THOU/MM3 (ref 130–400)
PMV BLD AUTO: 8.5 MCM (ref 7.4–10.4)
POTASSIUM SERPL-SCNC: 3.3 MEQ/L (ref 3.5–5.2)
RBC # BLD: 4.29 MILL/MM3 (ref 4.2–5.4)
SEG NEUTROPHILS: 76.3 %
SEGMENTED NEUTROPHILS ABSOLUTE COUNT: 10.4 THOU/MM3 (ref 1.8–7.7)
SODIUM BLD-SCNC: 139 MEQ/L (ref 135–145)
WBC # BLD: 13.6 THOU/MM3 (ref 4.8–10.8)

## 2017-11-27 PROCEDURE — 6370000000 HC RX 637 (ALT 250 FOR IP): Performed by: HOSPITALIST

## 2017-11-27 PROCEDURE — 36415 COLL VENOUS BLD VENIPUNCTURE: CPT

## 2017-11-27 PROCEDURE — 85025 COMPLETE CBC W/AUTO DIFF WBC: CPT

## 2017-11-27 PROCEDURE — 6370000000 HC RX 637 (ALT 250 FOR IP): Performed by: INTERNAL MEDICINE

## 2017-11-27 PROCEDURE — 99232 SBSQ HOSP IP/OBS MODERATE 35: CPT | Performed by: HOSPITALIST

## 2017-11-27 PROCEDURE — 2580000003 HC RX 258: Performed by: INTERNAL MEDICINE

## 2017-11-27 PROCEDURE — 6360000002 HC RX W HCPCS: Performed by: INTERNAL MEDICINE

## 2017-11-27 PROCEDURE — 80048 BASIC METABOLIC PNL TOTAL CA: CPT

## 2017-11-27 RX ORDER — CIPROFLOXACIN 500 MG/1
500 TABLET, FILM COATED ORAL EVERY 12 HOURS
Qty: 14 TABLET | Refills: 0 | Status: SHIPPED | OUTPATIENT
Start: 2017-11-27 | End: 2017-12-04

## 2017-11-27 RX ORDER — METRONIDAZOLE 500 MG/1
500 TABLET ORAL EVERY 8 HOURS SCHEDULED
Qty: 21 TABLET | Refills: 0 | Status: SHIPPED | OUTPATIENT
Start: 2017-11-27 | End: 2017-12-04

## 2017-11-27 RX ORDER — POTASSIUM CHLORIDE 20 MEQ/1
40 TABLET, EXTENDED RELEASE ORAL ONCE
Status: COMPLETED | OUTPATIENT
Start: 2017-11-27 | End: 2017-11-27

## 2017-11-27 RX ORDER — CIPROFLOXACIN 500 MG/1
500 TABLET, FILM COATED ORAL EVERY 12 HOURS
Status: DISCONTINUED | OUTPATIENT
Start: 2017-11-27 | End: 2017-11-27 | Stop reason: HOSPADM

## 2017-11-27 RX ADMIN — SODIUM CHLORIDE: 9 INJECTION, SOLUTION INTRAVENOUS at 01:00

## 2017-11-27 RX ADMIN — POTASSIUM CHLORIDE 40 MEQ: 1500 TABLET, EXTENDED RELEASE ORAL at 12:13

## 2017-11-27 RX ADMIN — CIPROFLOXACIN 400 MG: 2 INJECTION, SOLUTION INTRAVENOUS at 06:10

## 2017-11-27 RX ADMIN — METRONIDAZOLE 500 MG: 500 TABLET, FILM COATED ORAL at 06:10

## 2017-11-27 ASSESSMENT — PAIN SCALES - GENERAL: PAINLEVEL_OUTOF10: 0

## 2017-11-27 NOTE — PROGRESS NOTES
CLINICAL PHARMACY: DISCHARGE MED RECONCILIATION/REVIEW    [facility] ([facility]) Select Patient?: No  Total # of Interventions Recommended: 0   -   Total # Interventions Accepted: 0  Intervention Severity:   - Level 1 Intervention Present?: No   - Level 2 #: 0   - Level 3 #: 0   Time Spent (min): 15    Additional Documentation: Radha Bee over new medications patient is being discharged on, AVS completed, patient ready for discharge.     Magdaleno Johnson, PharmD  PGY-1 Resident  11/27/2017 11:41 AM

## 2017-11-27 NOTE — PLAN OF CARE
Problem: Discharge Planning:  Goal: Discharged to appropriate level of care  Discharged to appropriate level of care   Outcome: Ongoing  Plans to discharge home with  when well. Problem: Gas Exchange - Impaired:  Goal: Levels of oxygenation will improve  Levels of oxygenation will improve   Outcome: Met This Shift  Oxygen saturations adequate 93-95% on room air. Problem: Infection, Septic Shock:  Goal: Will show no infection signs and symptoms  Will show no infection signs and symptoms   Outcome: Ongoing  No signs or symptoms of infection this shift. WBC 14.3 as of yesterday, down from 20.6. Still taking IV Cipro and PO Flagyl. Problem: Safety:  Goal: Free from accidental physical injury  Free from accidental physical injury   Outcome: Ongoing  Patient remains free of falls this shift. Non skid socks on, bed in lowest position, bed alarm on, call light within reach, siderails up x2. Frequent rounding. Daughter Denise Plunkett helps as needed. Problem: Daily Care:  Goal: Daily care needs are met  Daily care needs are met   Outcome: Ongoing  Patient able to perform all of her daily care needs. Nursing staff assists as needed. Problem: Cardiovascular  Goal: No DVT, peripheral vascular complications  Outcome: Ongoing  No signs or symptoms of DVT this shift. Receives Lovenox. Goal: Hemodynamic stability  Outcome: Ongoing  VS stable, H&H stable, WBC 14.6 trending down. Problem: GI  Goal: No bowel complications  Outcome: Ongoing  No episodes of diarrhea this shift. Will continue to monitor. Problem: Skin Integrity/Risk  Goal: No skin breakdown during hospitalization  Outcome: Ongoing  No new signs of skin breakdown. No skin issues. Problem: Pain:  Goal: Pain level will decrease  Pain level will decrease   Outcome: Ongoing  Patient denies any pain this shift. Does have some tenderness to the touch to bilateral upper quadrants. Will continue to monitor.      Comments: Care plan reviewed with patient and daughter Rolena Nicholas. Patient and daughter Rolena Nicholas verbalize understanding of the plan of care and contribute to goal setting.     Electronically signed by Harsha Daniels RN on 11/27/2017 at 5:24 AM

## 2017-11-27 NOTE — DISCHARGE SUMMARY
Discharge Summary    Patient:  Ivy Leyva  YOB: 1980    MRN: [id number]   Acct: [de-identified]    Primary Care Physician: Leydi Guzman NP    Admit date:  11/25/2017    Discharge date:   11/27/2017       Discharge Diagnoses:   <principal problem not specified>  Active Problems:    Colitis      Admitted for: (HPI)  40 y.o. female who presented to [address] [place] [address] with abdominal pain. Started this morning out of the blue. Felt fine yesterday. Started having some nausea, no vomiting. Then started having diarrhea, with blood. No fevers or chills. No change in diet, did not eat out. History of diverticulitis. CT scan shows colitis. Elevated white count. Elevated lactic acid    Hospital Course:  40year old admitted with acute diverticulitis. She was treated with IV fluids, started on Cipro and flagyl and bowel rest.   Her symptoms improved and diet was advanced and she tolerated it well.     Consultants:  Patient Care Team:  Leydi Guzman NP as PCP - General    Discharge Medications:     Medication List      START taking these medications    ciprofloxacin 500 MG tablet  Commonly known as:  CIPRO  Take 1 tablet by mouth every 12 hours for 7 days     metroNIDAZOLE 500 MG tablet  Commonly known as:  FLAGYL  Take 1 tablet by mouth every 8 hours for 7 days        CONTINUE taking these medications    ibuprofen 200 MG tablet  Commonly known as:  ADVIL;MOTRIN           Where to Get Your Medications      These medications were sent to [address] [place] [address] [phone]  [address] [place] [address]    Phone:  [phone]   · ciprofloxacin 500 MG tablet  · metroNIDAZOLE 500 MG tablet           Physical Exam:    Vitals:  Vitals:    11/26/17 1526 11/26/17 1915 11/27/17 0415 11/27/17 0754   BP: 126/73 121/66 103/61 (!) 98/58   Pulse: 83 80 75 77   Resp: 18 16 16 16   Temp: 98.4 °F (36.9 °C) 98.6 °F (37 °C) 98 °F (36.7 °C) 98.5 °F (36.9 °C) TempSrc: Oral Oral Oral Oral   SpO2: 94% 94% 93% 95%   Weight:   178 lb (80.7 kg)    Height:         Weight: Weight: 178 lb (80.7 kg)     24 hour intake/output:  Intake/Output Summary (Last 24 hours) at 11/27/17 1003  Last data filed at 11/27/17 0427   Gross per 24 hour   Intake          3425.34 ml   Output             1600 ml   Net          1825.34 ml       General appearance - alert awake appears to be in no acute distress  Chest - Bilateral air entry, no wheeze  Heart - S1S2 RRR, no murmurs or gallops  Abdomen - soft, non tender, normoactive bowel sounds  Neurological - non focal  Extremities - no edema  Skin - no rashes or lesions    Procedures:  na    Diagnostic Test:  na    Radiology reports as per the Radiologist  Radiology:  Ct Abdomen Pelvis W Iv Contrast Additional Contrast? Oral (4 Dose)    Result Date: 11/25/2017  PROCEDURE: CT ABDOMEN PELVIS W IV CONTRAST CLINICAL INFORMATION: upper abdominal pain COMPARISON: 4/2/2016 TECHNIQUE:  Axial CT images were obtained through the abdomen and pelvis following the intravenous administration of 80 mL Isovue 370 contrast and oral contrast. Coronal and sagittal reformatted images were rendered. All CT scans at this facility use dose modulation, iterative reconstruction, and/or weight-based dosing when appropriate to reduce radiation dose to as low as reasonably achievable. FINDINGS: Limited evaluation of the lung bases demonstrates mild dependent bibasilar atelectasis. The liver, gallbladder, pancreas and adrenal glands appear normal. There is a low-density lesion demonstrated within the spleen which is incompletely characterized. This measures approximately 56 Hounsfield units. This measures 2.2 x 1.5 cm. Previously this measured approximately 2.1 x 1.6 cm in April 2016. The kidneys enhance normally bilaterally. No hydronephrosis is seen. No hydroureter is demonstrated. No nephrolithiasis or ureterolithiasis is seen. There is no evidence of bowel obstruction.  The appendix is not visualized. Patient is status post prior hysterectomy. There are bilateral cystic lesions within the bilateral adnexa measuring approximately 4.3 x 3 cm on the right and 4.8 x 2.7 cm in the left. This may represent functional ovarian cysts but are incompletely characterized. No free fluid or free air is seen. No lymphadenopathy is demonstrated. The colonic bowel wall thickness appears mildly diffusely prominent. This is nonspecific and may be related to incomplete distention. However, cannot exclude a subtle inflammatory or infectious colitis. Although no significant perienteric inflammatory changes seen. 1. The colonic bowel wall thickness appears mildly diffusely prominent. This is nonspecific and may be related to incomplete distention. However, cannot exclude a subtle inflammatory or infectious colitis. Although no significant perienteric inflammatory  changes seen. 2. There are bilateral cystic lesions within the bilateral adnexa measuring approximately 4.3 x 3 cm on the right and 4.8 x 2.7 cm in the left. This may represent functional ovarian cysts but are incompletely characterized. 3. There is a 2.2 x 1.5 cm low-density lesion within the spleen. This appears similar in size when compared to prior examination from April 2016. This is incompletely characterized on the current examination. Follow-up nonemergent MRI with and without contrast obtained for further characterization as clinically indicated. **This report has been created using voice recognition software. It may contain minor errors which are inherent in voice recognition technology. ** Final report electronically signed by Dr. Orrin Koyanagi on 11/25/2017 1:51 PM      Results for orders placed or performed during the hospital encounter of 11/25/17   GI Bacterial Pathogens By PCR   Result Value Ref Range    Campylobacter PCR Not Detected     Clostridium difficile, PCR Not Detected     Plesiomonas Shigelloides PCR Not Detected Salmonella PCR Not Detected     Vibrio PCR Not Detected     Vibrio Cholerae PCR Not Detected     Yersinia Enterocolitica PCR Not Detected     E Coli Enteroaggregative PCR Not Detected     E Coli Enteropathogenic PCR Not Detected     E Coli Enterotoxigenic PCR Not Detected     E Coli Shiga Like Toxin PCR Not Detected     E Coli 0157 PCR NA     E Coli Shigella/Enteroinvasive PCR Not Detected     Cryptosporidium PCR Not Detected     Cyclospora Cayetanensis PCR Not Detected     E HISTOLYTICA GI FILM ARRAY Not Detected     Giardia Lamblia PCR Not Detected     Adenovirus F 40 41 PCR Not Detected     Astrovirus PCR Not Detected     Norovirus GI GII PCR Not Detected     Rotavirus A PCR Not Detected     Sapovirus PCR Not Detected    Culture blood #1   Result Value Ref Range    Blood Culture, Routine No growth-preliminary    Culture blood #2   Result Value Ref Range    Blood Culture, Routine No growth-preliminary    Culture Stool   Result Value Ref Range    Culture, Stool       No enteric pathogens isolated-preliminary. No enteric pathogens (Salmonella, Shigella, Campylobacter,  Aeromonas spp., Plesiomonas shigelloides, E. coli O157 or  shiga-toxin producing (STEC) E. coli) isolated.      CBC auto differential   Result Value Ref Range    WBC 20.6 (H) 4.8 - 10.8 thou/mm3    RBC 5.12 4.20 - 5.40 mill/mm3    Hemoglobin 15.5 12.0 - 16.0 gm/dl    Hematocrit 44.8 37.0 - 47.0 %    MCV 87.4 81.0 - 99.0 fL    MCH 30.3 27.0 - 31.0 pg    MCHC 34.7 33.0 - 37.0 gm/dl    RDW 13.1 11.5 - 14.5 %    Platelets 897 100 - 345 thou/mm3    MPV 9.0 7.4 - 10.4 mcm    Seg Neutrophils 68.0 %    Lymphocytes 25.0 %    Monocytes 5.0 %    Eosinophils 2.0 %    Basophils 0.0 %    nRBC 0 /100 wbc    Platelet Estimate ADEQUATE Adequate    Segs Absolute 14.0 (H) 1.8 - 7.7 thou/mm3    Lymphocytes # 5.2 (H) 1.0 - 4.8 thou/mm3    Monocytes # 1.0 0.4 - 1.3 thou/mm3    Eosinophils # 0.4 0.0 - 0.4 thou/mm3    Basophils # 0.0 0.0 - 0.1 thou/mm3   Basic Metabolic Panel   Result Value Ref Range    Sodium 138 135 - 145 meq/L    Potassium 3.5 3.5 - 5.2 meq/L    Chloride 99 98 - 111 meq/L    CO2 21 (L) 23 - 33 meq/L    Glucose 97 70 - 108 mg/dL    BUN 9 7 - 22 mg/dL    CREATININE 0.7 0.4 - 1.2 mg/dL    Calcium 9.9 8.5 - 10.5 mg/dL   Hepatic function panel   Result Value Ref Range    Alb 4.4 3.5 - 5.1 g/dL    Total Bilirubin 0.3 0.3 - 1.2 mg/dL    Bilirubin, Direct <0.2 0.0 - 0.3 mg/dL    Alkaline Phosphatase 102 38 - 126 U/L    AST 20 5 - 40 U/L    ALT 18 11 - 66 U/L    Total Protein 7.9 6.1 - 8.0 g/dL   Lipase   Result Value Ref Range    Lipase 23.8 5.6 - 51.3 U/L   Lactic acid, plasma   Result Value Ref Range    Lactic Acid 2.7 (H) 0.5 - 2.2 mmol/L   Urine Drug Screen   Result Value Ref Range    AMPHETAMINE+METHAMPHETAMINE URINE SCREEN Negative NEGATIVE    Barbiturate Quant, Ur Negative NEGATIVE    Benzodiazepine Quant, Ur Negative NEGATIVE    Cannabinoid Quant, Ur Negative NEGATIVE    Cocaine Metab Quant, Ur Negative NEGATIVE    Opiates, Urine POSITIVE NEGATIVE    Oxycodone Negative NEGATIVE    PCP Quant, Ur Negative NEGATIVE   Urine reflex to culture   Result Value Ref Range    Glucose, Ur NEGATIVE NEGATIVE mg/dl    Bilirubin Urine NEGATIVE NEGATIVE    Ketones, Urine NEGATIVE NEGATIVE    Specific Gravity, Urine 1.008 1.002 - 1.03    Blood, Urine NEGATIVE NEGATIVE    pH, UA 6.5 5.0 - 9.0    Protein, UA NEGATIVE NEGATIVE    Urobilinogen, Urine 0.2 0.0 - 1.0 eu/dl    Nitrite, Urine NEGATIVE NEGATIVE    Leukocyte Esterase, Urine NEGATIVE NEGATIVE    Color, UA YELLOW STRAW-YELL    Character, Urine CLEAR CLEAR-SL C   Ethanol   Result Value Ref Range    ETHYL ALCOHOL, SERUM < 0.01 0.00 %   Manual Differential   Result Value Ref Range    Differential, manual see below    Anion Gap   Result Value Ref Range    Anion Gap 18.0 (H) 8.0 - 16.0 meq/L   Glomerular Filtration Rate, Estimated   Result Value Ref Range    Est, Glom Filt Rate >90 ml/min/1.73m2   Osmolality   Result Value Ref

## 2017-11-27 NOTE — PROGRESS NOTES
Discharge teaching and instructions for diagnosis/procedure of colitis completed with patient using teachback method. AVS reviewed. Printed prescriptions given to patient. Patient voiced understanding regarding prescriptions, follow up appointments, and care of self at home.  Discharged in a wheelchair to  home with support per family

## 2017-12-01 LAB
BLOOD CULTURE, ROUTINE: NORMAL
BLOOD CULTURE, ROUTINE: NORMAL

## 2018-10-25 ENCOUNTER — HOSPITAL ENCOUNTER (EMERGENCY)
Age: 38
Discharge: HOME | End: 2018-10-26
Attending: FAMILY MEDICINE

## 2018-10-25 ENCOUNTER — APPOINTMENT (OUTPATIENT)
Dept: GENERAL RADIOLOGY | Age: 38
End: 2018-10-25

## 2018-10-25 ENCOUNTER — APPOINTMENT (OUTPATIENT)
Dept: CT IMAGING | Age: 38
End: 2018-10-25

## 2018-10-25 VITALS
DIASTOLIC BLOOD PRESSURE: 82 MMHG | SYSTOLIC BLOOD PRESSURE: 110 MMHG | OXYGEN SATURATION: 96 % | RESPIRATION RATE: 15 BRPM | TEMPERATURE: 98.3 F | HEART RATE: 81 BPM

## 2018-10-25 DIAGNOSIS — Z86.2 HX OF LEUKOCYTOSIS: ICD-10-CM

## 2018-10-25 DIAGNOSIS — R42 DIZZINESS: Primary | ICD-10-CM

## 2018-10-25 DIAGNOSIS — D72.829 LEUKOCYTOSIS, UNSPECIFIED TYPE: ICD-10-CM

## 2018-10-25 LAB
ALBUMIN SERPL-MCNC: 4.1 G/DL (ref 3.5–5.1)
ALP BLD-CCNC: 79 U/L (ref 38–126)
ALT SERPL-CCNC: 17 U/L (ref 11–66)
ANION GAP SERPL CALCULATED.3IONS-SCNC: 16 MEQ/L (ref 8–16)
AST SERPL-CCNC: 16 U/L (ref 5–40)
BASOPHILS # BLD: 0.5 %
BASOPHILS ABSOLUTE: 0.1 THOU/MM3 (ref 0–0.1)
BILIRUB SERPL-MCNC: 0.2 MG/DL (ref 0.3–1.2)
BUN BLDV-MCNC: 7 MG/DL (ref 7–22)
CALCIUM SERPL-MCNC: 9.6 MG/DL (ref 8.5–10.5)
CHLORIDE BLD-SCNC: 104 MEQ/L (ref 98–111)
CO2: 24 MEQ/L (ref 23–33)
CREAT SERPL-MCNC: 0.8 MG/DL (ref 0.4–1.2)
EKG ATRIAL RATE: 75 BPM
EKG P AXIS: 34 DEGREES
EKG P-R INTERVAL: 148 MS
EKG Q-T INTERVAL: 378 MS
EKG QRS DURATION: 90 MS
EKG QTC CALCULATION (BAZETT): 422 MS
EKG R AXIS: 68 DEGREES
EKG T AXIS: 20 DEGREES
EKG VENTRICULAR RATE: 75 BPM
EOSINOPHIL # BLD: 2.2 %
EOSINOPHILS ABSOLUTE: 0.5 THOU/MM3 (ref 0–0.4)
ERYTHROCYTE [DISTWIDTH] IN BLOOD BY AUTOMATED COUNT: 12.7 % (ref 11.5–14.5)
ERYTHROCYTE [DISTWIDTH] IN BLOOD BY AUTOMATED COUNT: 40.9 FL (ref 35–45)
GFR SERPL CREATININE-BSD FRML MDRD: 81 ML/MIN/1.73M2
GLUCOSE BLD-MCNC: 122 MG/DL (ref 70–108)
HCT VFR BLD CALC: 42.5 % (ref 37–47)
HEMOGLOBIN: 14.5 GM/DL (ref 12–16)
IMMATURE GRANS (ABS): 0.08 THOU/MM3 (ref 0–0.07)
IMMATURE GRANULOCYTES: 0.4 %
LACTIC ACID, SEPSIS: 1.3 MMOL/L (ref 0.5–1.9)
LYMPHOCYTES # BLD: 31.1 %
LYMPHOCYTES ABSOLUTE: 6.4 THOU/MM3 (ref 1–4.8)
MAGNESIUM: 1.1 MG/DL (ref 1.6–2.4)
MCH RBC QN AUTO: 30 PG (ref 26–33)
MCHC RBC AUTO-ENTMCNC: 34.1 GM/DL (ref 32.2–35.5)
MCV RBC AUTO: 88 FL (ref 81–99)
MONOCYTES # BLD: 4.4 %
MONOCYTES ABSOLUTE: 0.9 THOU/MM3 (ref 0.4–1.3)
NUCLEATED RED BLOOD CELLS: 0 /100 WBC
OSMOLALITY CALCULATION: 286.1 MOSMOL/KG (ref 275–300)
PLATELET # BLD: 300 THOU/MM3 (ref 130–400)
PLATELET ESTIMATE: ADEQUATE
PMV BLD AUTO: 10.3 FL (ref 9.4–12.4)
POTASSIUM REFLEX MAGNESIUM: 3.1 MEQ/L (ref 3.5–5.2)
PROCALCITONIN: 0.04 NG/ML (ref 0.01–0.09)
RBC # BLD: 4.83 MILL/MM3 (ref 4.2–5.4)
SCAN OF BLOOD SMEAR: NORMAL
SEG NEUTROPHILS: 61.4 %
SEGMENTED NEUTROPHILS ABSOLUTE COUNT: 12.6 THOU/MM3 (ref 1.8–7.7)
SODIUM BLD-SCNC: 144 MEQ/L (ref 135–145)
TOTAL PROTEIN: 7 G/DL (ref 6.1–8)
TROPONIN T: < 0.01 NG/ML
TSH SERPL DL<=0.05 MIU/L-ACNC: 0.86 UIU/ML (ref 0.4–4.2)
WBC # BLD: 20.6 THOU/MM3 (ref 4.8–10.8)

## 2018-10-25 PROCEDURE — 83605 ASSAY OF LACTIC ACID: CPT

## 2018-10-25 PROCEDURE — 36415 COLL VENOUS BLD VENIPUNCTURE: CPT

## 2018-10-25 PROCEDURE — 85025 COMPLETE CBC W/AUTO DIFF WBC: CPT

## 2018-10-25 PROCEDURE — 81001 URINALYSIS AUTO W/SCOPE: CPT

## 2018-10-25 PROCEDURE — 84443 ASSAY THYROID STIM HORMONE: CPT

## 2018-10-25 PROCEDURE — 84484 ASSAY OF TROPONIN QUANT: CPT

## 2018-10-25 PROCEDURE — 99284 EMERGENCY DEPT VISIT MOD MDM: CPT

## 2018-10-25 PROCEDURE — 84145 PROCALCITONIN (PCT): CPT

## 2018-10-25 PROCEDURE — 80053 COMPREHEN METABOLIC PANEL: CPT

## 2018-10-25 PROCEDURE — 93005 ELECTROCARDIOGRAM TRACING: CPT | Performed by: FAMILY MEDICINE

## 2018-10-25 PROCEDURE — 83735 ASSAY OF MAGNESIUM: CPT

## 2018-10-25 PROCEDURE — 87040 BLOOD CULTURE FOR BACTERIA: CPT

## 2018-10-25 PROCEDURE — 70450 CT HEAD/BRAIN W/O DYE: CPT

## 2018-10-25 PROCEDURE — 71046 X-RAY EXAM CHEST 2 VIEWS: CPT

## 2018-10-25 RX ORDER — 0.9 % SODIUM CHLORIDE 0.9 %
1000 INTRAVENOUS SOLUTION INTRAVENOUS ONCE
Status: DISCONTINUED | OUTPATIENT
Start: 2018-10-25 | End: 2018-10-26 | Stop reason: HOSPADM

## 2018-10-25 ASSESSMENT — ENCOUNTER SYMPTOMS
TROUBLE SWALLOWING: 0
NAUSEA: 0
VOMITING: 0
CHEST TIGHTNESS: 0
COUGH: 0
ABDOMINAL PAIN: 0
VOICE CHANGE: 0
GASTROINTESTINAL NEGATIVE: 1

## 2018-10-26 LAB
BACTERIA: ABNORMAL
BILIRUBIN URINE: NEGATIVE
BLOOD, URINE: NEGATIVE
CASTS: ABNORMAL /LPF
CHARACTER, URINE: ABNORMAL
COLOR: YELLOW
CRYSTALS: ABNORMAL
EPITHELIAL CELLS, UA: ABNORMAL /HPF
GLUCOSE, URINE: NEGATIVE MG/DL
KETONES, URINE: NEGATIVE
LEUKOCYTE ESTERASE, URINE: NEGATIVE
NITRITE, URINE: NEGATIVE
PH UA: 6.5
PROTEIN UA: NEGATIVE MG/DL
RBC URINE: ABNORMAL /HPF
RENAL EPITHELIAL, UA: ABNORMAL
SPECIFIC GRAVITY UA: 1.02 (ref 1–1.03)
UROBILINOGEN, URINE: 1 EU/DL
WBC UA: ABNORMAL /HPF
YEAST: ABNORMAL

## 2018-10-26 PROCEDURE — 93010 ELECTROCARDIOGRAM REPORT: CPT | Performed by: INTERNAL MEDICINE

## 2018-10-26 ASSESSMENT — ENCOUNTER SYMPTOMS
SINUS PAIN: 0
SORE THROAT: 0
SINUS PRESSURE: 0

## 2018-10-26 NOTE — ED PROVIDER NOTES
[facility] EMERGENCY DEPT      CHIEF COMPLAINT       Chief Complaint   Patient presents with    Dizziness       Nurses Notes reviewed and I agree except as noted in the HPI. HISTORY OF PRESENT ILLNESS    April D Pili a 41 y.o. female who presents  To the emergency department for evaluation of dizziness. Patient said that a month ago she went on a cruise and at that time she had motion sickness. Since that time she has had dizziness. She describes her dizziness as room spinning and feeling off balance. She is on meclizine for the last 3 weeks and despite taking this medicine she is not getting any better. She is here for further evaluation because she is just not getting better. Apart from the feeling dizziness and off balance she doesn't have any headache , neck pain or back pain she doesn't have any focal neurological deficits she doesn't have any weakness of upper or lower extremities and No paresthesias and no headaches. She denies any associated nausea vomiting diarrhea and also no cough congestion and chest pain shortness of breath and no associated dermatologic musculoskeletal symptoms. She otherwise feels well within no constitutional symptoms no fevers and physical exam is unremarkable. REVIEW OF SYSTEMS     Review of Systems   Constitutional: Negative. Negative for chills and fever. HENT: Negative for congestion, ear discharge, ear pain, hearing loss, sinus pain, sinus pressure, sneezing, sore throat, trouble swallowing and voice change. Respiratory: Negative for cough and chest tightness. Cardiovascular: Negative. Negative for chest pain and leg swelling. Gastrointestinal: Negative. Negative for abdominal pain, nausea and vomiting. Musculoskeletal: Negative. Negative for neck pain and neck stiffness. Skin: Negative. Negative for pallor and rash. Neurological: Positive for dizziness and light-headedness.  Negative for tremors, seizures, syncope, facial visualized and preliminarily interpreted by the emergency physician unless otherwise stated below. XR CHEST STANDARD (2 VW)   Final Result   Stable radiographic appearance of the chest. No evidence of an acute process. **This report has been created using voice recognition software. It may contain minor errors which are inherent in voice recognition technology. **      Final report electronically signed by Dr. Airam Arroyo on 10/25/2018 11:29 PM      CT Head WO Contrast   Final Result    No evidence of an acute process. **This report has been created using voice recognition software. It may contain minor errors which are inherent in voice recognition technology. **      Final report electronically signed by Dr. Airam Arroyo on 10/25/2018 10:33 PM          LABS:   Labs Reviewed   CBC WITH AUTO DIFFERENTIAL - Abnormal; Notable for the following:        Result Value    WBC 20.6 (*)     Segs Absolute 12.6 (*)     Lymphocytes # 6.4 (*)     Eosinophils # 0.5 (*)     Immature Grans (Abs) 0.08 (*)     All other components within normal limits   COMPREHENSIVE METABOLIC PANEL W/ REFLEX TO MG FOR LOW K - Abnormal; Notable for the following:     Glucose 122 (*)     Potassium reflex Magnesium 3.1 (*)     Total Bilirubin 0.2 (*)     All other components within normal limits   MAGNESIUM - Abnormal; Notable for the following:     Magnesium 1.1 (*)     All other components within normal limits   GLOMERULAR FILTRATION RATE, ESTIMATED - Abnormal; Notable for the following:     Est, Glom Filt Rate 81 (*)     All other components within normal limits   CULTURE BLOOD #1   CULTURE BLOOD #2   TROPONIN   TSH WITHOUT REFLEX   ANION GAP   OSMOLALITY   SCAN OF BLOOD SMEAR   PROCALCITONIN   LACTATE, SEPSIS   LACTIC ACID   LACTATE, SEPSIS   URINALYSIS WITH MICROSCOPIC       EMERGENCY DEPARTMENT COURSE:   Vitals:    Vitals:    10/25/18 2152 10/25/18 2337   BP: (!) 126/93 110/82   Pulse: 83 81   Resp: 15 15   Temp:

## 2018-10-31 LAB
BLOOD CULTURE, ROUTINE: NORMAL
BLOOD CULTURE, ROUTINE: NORMAL

## 2019-10-16 ENCOUNTER — HOSPITAL ENCOUNTER (INPATIENT)
Age: 39
LOS: 3 days | Discharge: HOME | DRG: 871 | End: 2019-10-20
Attending: FAMILY MEDICINE | Admitting: FAMILY MEDICINE

## 2019-10-16 ENCOUNTER — APPOINTMENT (OUTPATIENT)
Dept: GENERAL RADIOLOGY | Age: 39
DRG: 871 | End: 2019-10-16

## 2019-10-16 DIAGNOSIS — J18.9 PNEUMONIA DUE TO ORGANISM: Primary | ICD-10-CM

## 2019-10-16 DIAGNOSIS — J45.41 MODERATE PERSISTENT ASTHMA WITH EXACERBATION: ICD-10-CM

## 2019-10-16 PROBLEM — J45.901 ACUTE ASTHMA EXACERBATION: Status: ACTIVE | Noted: 2019-10-16

## 2019-10-16 LAB
ANION GAP SERPL CALCULATED.3IONS-SCNC: 12 MEQ/L (ref 8–16)
BASOPHILS # BLD: 0.5 %
BASOPHILS ABSOLUTE: 0 THOU/MM3 (ref 0–0.1)
BUN BLDV-MCNC: 10 MG/DL (ref 7–22)
CALCIUM SERPL-MCNC: 8.8 MG/DL (ref 8.5–10.5)
CHLORIDE BLD-SCNC: 103 MEQ/L (ref 98–111)
CO2: 28 MEQ/L (ref 23–33)
CREAT SERPL-MCNC: 0.8 MG/DL (ref 0.4–1.2)
EKG ATRIAL RATE: 119 BPM
EKG P AXIS: 30 DEGREES
EKG P-R INTERVAL: 120 MS
EKG Q-T INTERVAL: 320 MS
EKG QRS DURATION: 82 MS
EKG QTC CALCULATION (BAZETT): 450 MS
EKG R AXIS: 58 DEGREES
EKG T AXIS: 54 DEGREES
EKG VENTRICULAR RATE: 119 BPM
EOSINOPHIL # BLD: 1.6 %
EOSINOPHILS ABSOLUTE: 0.1 THOU/MM3 (ref 0–0.4)
ERYTHROCYTE [DISTWIDTH] IN BLOOD BY AUTOMATED COUNT: 12.6 % (ref 11.5–14.5)
ERYTHROCYTE [DISTWIDTH] IN BLOOD BY AUTOMATED COUNT: 41.5 FL (ref 35–45)
FLU A ANTIGEN: NEGATIVE
FLU B ANTIGEN: NEGATIVE
GFR SERPL CREATININE-BSD FRML MDRD: 80 ML/MIN/1.73M2
GLUCOSE BLD-MCNC: 151 MG/DL (ref 70–108)
HCT VFR BLD CALC: 41.8 % (ref 37–47)
HEMOGLOBIN: 14.2 GM/DL (ref 12–16)
IMMATURE GRANS (ABS): 0.03 THOU/MM3 (ref 0–0.07)
IMMATURE GRANULOCYTES: 0.4 %
LYMPHOCYTES # BLD: 27.2 %
LYMPHOCYTES ABSOLUTE: 2.2 THOU/MM3 (ref 1–4.8)
MAGNESIUM: 1.1 MG/DL (ref 1.6–2.4)
MCH RBC QN AUTO: 30.9 PG (ref 26–33)
MCHC RBC AUTO-ENTMCNC: 34 GM/DL (ref 32.2–35.5)
MCV RBC AUTO: 90.9 FL (ref 81–99)
MONOCYTES # BLD: 7.3 %
MONOCYTES ABSOLUTE: 0.6 THOU/MM3 (ref 0.4–1.3)
NUCLEATED RED BLOOD CELLS: 0 /100 WBC
OSMOLALITY CALCULATION: 286.9 MOSMOL/KG (ref 275–300)
PLATELET # BLD: 156 THOU/MM3 (ref 130–400)
PMV BLD AUTO: 10.2 FL (ref 9.4–12.4)
POTASSIUM SERPL-SCNC: 3 MEQ/L (ref 3.5–5.2)
RBC # BLD: 4.6 MILL/MM3 (ref 4.2–5.4)
SEG NEUTROPHILS: 63 %
SEGMENTED NEUTROPHILS ABSOLUTE COUNT: 5.2 THOU/MM3 (ref 1.8–7.7)
SODIUM BLD-SCNC: 143 MEQ/L (ref 135–145)
TROPONIN T: < 0.01 NG/ML
WBC # BLD: 8.2 THOU/MM3 (ref 4.8–10.8)

## 2019-10-16 PROCEDURE — 84484 ASSAY OF TROPONIN QUANT: CPT

## 2019-10-16 PROCEDURE — 93005 ELECTROCARDIOGRAM TRACING: CPT | Performed by: EMERGENCY MEDICINE

## 2019-10-16 PROCEDURE — 99285 EMERGENCY DEPT VISIT HI MDM: CPT

## 2019-10-16 PROCEDURE — 87804 INFLUENZA ASSAY W/OPTIC: CPT

## 2019-10-16 PROCEDURE — 36415 COLL VENOUS BLD VENIPUNCTURE: CPT

## 2019-10-16 PROCEDURE — 6360000002 HC RX W HCPCS

## 2019-10-16 PROCEDURE — 96365 THER/PROPH/DIAG IV INF INIT: CPT

## 2019-10-16 PROCEDURE — 94761 N-INVAS EAR/PLS OXIMETRY MLT: CPT

## 2019-10-16 PROCEDURE — 6370000000 HC RX 637 (ALT 250 FOR IP): Performed by: NURSE PRACTITIONER

## 2019-10-16 PROCEDURE — 80048 BASIC METABOLIC PNL TOTAL CA: CPT

## 2019-10-16 PROCEDURE — 6370000000 HC RX 637 (ALT 250 FOR IP): Performed by: EMERGENCY MEDICINE

## 2019-10-16 PROCEDURE — 71045 X-RAY EXAM CHEST 1 VIEW: CPT

## 2019-10-16 PROCEDURE — 2700000000 HC OXYGEN THERAPY PER DAY

## 2019-10-16 PROCEDURE — 6360000002 HC RX W HCPCS: Performed by: NURSE PRACTITIONER

## 2019-10-16 PROCEDURE — 96375 TX/PRO/DX INJ NEW DRUG ADDON: CPT

## 2019-10-16 PROCEDURE — 2580000003 HC RX 258: Performed by: NURSE PRACTITIONER

## 2019-10-16 PROCEDURE — 87040 BLOOD CULTURE FOR BACTERIA: CPT

## 2019-10-16 PROCEDURE — 85025 COMPLETE CBC W/AUTO DIFF WBC: CPT

## 2019-10-16 PROCEDURE — 83735 ASSAY OF MAGNESIUM: CPT

## 2019-10-16 PROCEDURE — 94640 AIRWAY INHALATION TREATMENT: CPT

## 2019-10-16 RX ORDER — 0.9 % SODIUM CHLORIDE 0.9 %
1000 INTRAVENOUS SOLUTION INTRAVENOUS ONCE
Status: COMPLETED | OUTPATIENT
Start: 2019-10-17 | End: 2019-10-17

## 2019-10-16 RX ORDER — LEVOFLOXACIN 5 MG/ML
750 INJECTION, SOLUTION INTRAVENOUS ONCE
Status: COMPLETED | OUTPATIENT
Start: 2019-10-16 | End: 2019-10-17

## 2019-10-16 RX ORDER — MAGNESIUM SULFATE IN WATER 40 MG/ML
2 INJECTION, SOLUTION INTRAVENOUS ONCE
Status: COMPLETED | OUTPATIENT
Start: 2019-10-16 | End: 2019-10-16

## 2019-10-16 RX ORDER — LORAZEPAM 2 MG/ML
INJECTION INTRAMUSCULAR
Status: COMPLETED
Start: 2019-10-16 | End: 2019-10-16

## 2019-10-16 RX ORDER — ACETAMINOPHEN 500 MG
1000 TABLET ORAL ONCE
Status: COMPLETED | OUTPATIENT
Start: 2019-10-16 | End: 2019-10-16

## 2019-10-16 RX ORDER — LORAZEPAM 2 MG/ML
1 INJECTION INTRAMUSCULAR ONCE
Status: COMPLETED | OUTPATIENT
Start: 2019-10-16 | End: 2019-10-16

## 2019-10-16 RX ORDER — BENZONATATE 100 MG/1
200 CAPSULE ORAL ONCE
Status: COMPLETED | OUTPATIENT
Start: 2019-10-16 | End: 2019-10-16

## 2019-10-16 RX ORDER — DIPHENHYDRAMINE HYDROCHLORIDE 50 MG/ML
INJECTION INTRAMUSCULAR; INTRAVENOUS
Status: COMPLETED
Start: 2019-10-16 | End: 2019-10-16

## 2019-10-16 RX ORDER — 0.9 % SODIUM CHLORIDE 0.9 %
1000 INTRAVENOUS SOLUTION INTRAVENOUS ONCE
Status: COMPLETED | OUTPATIENT
Start: 2019-10-16 | End: 2019-10-16

## 2019-10-16 RX ORDER — METHYLPREDNISOLONE SODIUM SUCCINATE 125 MG/2ML
125 INJECTION, POWDER, LYOPHILIZED, FOR SOLUTION INTRAMUSCULAR; INTRAVENOUS ONCE
Status: COMPLETED | OUTPATIENT
Start: 2019-10-16 | End: 2019-10-16

## 2019-10-16 RX ORDER — LORAZEPAM 2 MG/ML
1 INJECTION INTRAMUSCULAR ONCE
Status: COMPLETED | OUTPATIENT
Start: 2019-10-17 | End: 2019-10-16

## 2019-10-16 RX ORDER — LEVALBUTEROL INHALATION SOLUTION 1.25 MG/3ML
1.25 SOLUTION RESPIRATORY (INHALATION) ONCE
Status: COMPLETED | OUTPATIENT
Start: 2019-10-16 | End: 2019-10-16

## 2019-10-16 RX ORDER — POTASSIUM CHLORIDE 750 MG/1
40 TABLET, FILM COATED, EXTENDED RELEASE ORAL ONCE
Status: COMPLETED | OUTPATIENT
Start: 2019-10-16 | End: 2019-10-16

## 2019-10-16 RX ORDER — IPRATROPIUM BROMIDE AND ALBUTEROL SULFATE 2.5; .5 MG/3ML; MG/3ML
1 SOLUTION RESPIRATORY (INHALATION) ONCE
Status: COMPLETED | OUTPATIENT
Start: 2019-10-16 | End: 2019-10-16

## 2019-10-16 RX ADMIN — ACETAMINOPHEN 1000 MG: 500 TABLET, FILM COATED ORAL at 22:26

## 2019-10-16 RX ADMIN — LEVOFLOXACIN 750 MG: 5 INJECTION, SOLUTION INTRAVENOUS at 23:39

## 2019-10-16 RX ADMIN — POTASSIUM CHLORIDE 40 MEQ: 750 TABLET, FILM COATED, EXTENDED RELEASE ORAL at 21:45

## 2019-10-16 RX ADMIN — IPRATROPIUM BROMIDE AND ALBUTEROL SULFATE 1 AMPULE: .5; 3 SOLUTION RESPIRATORY (INHALATION) at 20:20

## 2019-10-16 RX ADMIN — LORAZEPAM 1 MG: 2 INJECTION INTRAMUSCULAR at 23:58

## 2019-10-16 RX ADMIN — SODIUM CHLORIDE 1000 ML: 9 INJECTION, SOLUTION INTRAVENOUS at 20:44

## 2019-10-16 RX ADMIN — LORAZEPAM 1 MG: 2 INJECTION INTRAMUSCULAR; INTRAVENOUS at 23:58

## 2019-10-16 RX ADMIN — SODIUM CHLORIDE 1000 ML: 9 INJECTION, SOLUTION INTRAVENOUS at 23:52

## 2019-10-16 RX ADMIN — IPRATROPIUM BROMIDE AND ALBUTEROL SULFATE 1 AMPULE: .5; 3 SOLUTION RESPIRATORY (INHALATION) at 20:15

## 2019-10-16 RX ADMIN — LORAZEPAM 1 MG: 2 INJECTION INTRAMUSCULAR; INTRAVENOUS at 20:49

## 2019-10-16 RX ADMIN — MAGNESIUM SULFATE HEPTAHYDRATE 2 G: 40 INJECTION, SOLUTION INTRAVENOUS at 21:22

## 2019-10-16 RX ADMIN — METHYLPREDNISOLONE SODIUM SUCCINATE 125 MG: 125 INJECTION, POWDER, FOR SOLUTION INTRAMUSCULAR; INTRAVENOUS at 20:49

## 2019-10-16 RX ADMIN — BENZONATATE 200 MG: 100 CAPSULE ORAL at 21:10

## 2019-10-16 RX ADMIN — DIPHENHYDRAMINE HYDROCHLORIDE 25 MG: 50 INJECTION, SOLUTION INTRAMUSCULAR; INTRAVENOUS at 20:49

## 2019-10-16 RX ADMIN — IPRATROPIUM BROMIDE 0.5 MG: 0.5 SOLUTION RESPIRATORY (INHALATION) at 22:44

## 2019-10-16 RX ADMIN — LEVALBUTEROL HYDROCHLORIDE 1.25 MG: 1.25 SOLUTION RESPIRATORY (INHALATION) at 22:50

## 2019-10-16 ASSESSMENT — PAIN DESCRIPTION - ONSET: ONSET: ON-GOING

## 2019-10-16 ASSESSMENT — PAIN DESCRIPTION - DESCRIPTORS: DESCRIPTORS: SHARP;TIGHTNESS

## 2019-10-16 ASSESSMENT — PAIN DESCRIPTION - LOCATION
LOCATION: CHEST
LOCATION: CHEST

## 2019-10-16 ASSESSMENT — ENCOUNTER SYMPTOMS
CHEST TIGHTNESS: 1
VOMITING: 0
COUGH: 1
CONSTIPATION: 0
ABDOMINAL PAIN: 0
SHORTNESS OF BREATH: 1
WHEEZING: 1
NAUSEA: 0
DIARRHEA: 0

## 2019-10-16 ASSESSMENT — PAIN SCALES - GENERAL
PAINLEVEL_OUTOF10: 4
PAINLEVEL_OUTOF10: 1
PAINLEVEL_OUTOF10: 6

## 2019-10-16 ASSESSMENT — PAIN DESCRIPTION - PAIN TYPE
TYPE: ACUTE PAIN
TYPE: ACUTE PAIN

## 2019-10-16 ASSESSMENT — PAIN DESCRIPTION - PROGRESSION: CLINICAL_PROGRESSION: NOT CHANGED

## 2019-10-16 ASSESSMENT — PAIN DESCRIPTION - FREQUENCY: FREQUENCY: CONTINUOUS

## 2019-10-16 ASSESSMENT — PULMONARY FUNCTION TESTS: PEFR_L/MIN: 150

## 2019-10-17 ENCOUNTER — APPOINTMENT (OUTPATIENT)
Dept: GENERAL RADIOLOGY | Age: 39
DRG: 871 | End: 2019-10-17

## 2019-10-17 PROBLEM — E87.6 HYPOKALEMIA: Status: ACTIVE | Noted: 2019-10-17

## 2019-10-17 PROBLEM — E83.42 HYPOMAGNESEMIA: Status: ACTIVE | Noted: 2019-10-17

## 2019-10-17 PROBLEM — Z72.0 TOBACCO ABUSE: Status: ACTIVE | Noted: 2019-10-17

## 2019-10-17 PROBLEM — R06.02 SHORTNESS OF BREATH: Status: ACTIVE | Noted: 2019-10-17

## 2019-10-17 PROBLEM — R50.9 FEVER: Status: ACTIVE | Noted: 2019-10-17

## 2019-10-17 LAB
ANION GAP SERPL CALCULATED.3IONS-SCNC: 16 MEQ/L (ref 8–16)
AVERAGE GLUCOSE: 108 MG/DL (ref 70–126)
BASOPHILS # BLD: 0.2 %
BASOPHILS ABSOLUTE: 0 THOU/MM3 (ref 0–0.1)
BUN BLDV-MCNC: 10 MG/DL (ref 7–22)
CALCIUM SERPL-MCNC: 7.8 MG/DL (ref 8.5–10.5)
CHLORIDE BLD-SCNC: 106 MEQ/L (ref 98–111)
CO2: 21 MEQ/L (ref 23–33)
CREAT SERPL-MCNC: 0.8 MG/DL (ref 0.4–1.2)
EOSINOPHIL # BLD: 2.2 %
EOSINOPHILS ABSOLUTE: 0.2 THOU/MM3 (ref 0–0.4)
ERYTHROCYTE [DISTWIDTH] IN BLOOD BY AUTOMATED COUNT: 12.6 % (ref 11.5–14.5)
ERYTHROCYTE [DISTWIDTH] IN BLOOD BY AUTOMATED COUNT: 42 FL (ref 35–45)
GFR SERPL CREATININE-BSD FRML MDRD: 80 ML/MIN/1.73M2
GLUCOSE BLD-MCNC: 169 MG/DL (ref 70–108)
GLUCOSE BLD-MCNC: 170 MG/DL (ref 70–108)
GLUCOSE BLD-MCNC: 191 MG/DL (ref 70–108)
GLUCOSE BLD-MCNC: 218 MG/DL (ref 70–108)
GLUCOSE BLD-MCNC: 269 MG/DL (ref 70–108)
HBA1C MFR BLD: 5.6 % (ref 4.4–6.4)
HCT VFR BLD CALC: 39.3 % (ref 37–47)
HEMOGLOBIN: 13.2 GM/DL (ref 12–16)
IMMATURE GRANS (ABS): 0.04 THOU/MM3 (ref 0–0.07)
IMMATURE GRANULOCYTES: 0.5 %
LYMPHOCYTES # BLD: 9.1 %
LYMPHOCYTES ABSOLUTE: 0.8 THOU/MM3 (ref 1–4.8)
MAGNESIUM: 1.4 MG/DL (ref 1.6–2.4)
MCH RBC QN AUTO: 30.8 PG (ref 26–33)
MCHC RBC AUTO-ENTMCNC: 33.6 GM/DL (ref 32.2–35.5)
MCV RBC AUTO: 91.6 FL (ref 81–99)
MONOCYTES # BLD: 1.1 %
MONOCYTES ABSOLUTE: 0.1 THOU/MM3 (ref 0.4–1.3)
NUCLEATED RED BLOOD CELLS: 0 /100 WBC
PLATELET # BLD: 160 THOU/MM3 (ref 130–400)
PLATELET ESTIMATE: ADEQUATE
PMV BLD AUTO: 10.2 FL (ref 9.4–12.4)
POTASSIUM REFLEX MAGNESIUM: 3.3 MEQ/L (ref 3.5–5.2)
PROCALCITONIN: 0.11 NG/ML (ref 0.01–0.09)
RBC # BLD: 4.29 MILL/MM3 (ref 4.2–5.4)
SCAN OF BLOOD SMEAR: NORMAL
SEG NEUTROPHILS: 86.9 %
SEGMENTED NEUTROPHILS ABSOLUTE COUNT: 7.4 THOU/MM3 (ref 1.8–7.7)
SODIUM BLD-SCNC: 143 MEQ/L (ref 135–145)
WBC # BLD: 8.5 THOU/MM3 (ref 4.8–10.8)

## 2019-10-17 PROCEDURE — 94760 N-INVAS EAR/PLS OXIMETRY 1: CPT

## 2019-10-17 PROCEDURE — 6360000002 HC RX W HCPCS: Performed by: INTERNAL MEDICINE

## 2019-10-17 PROCEDURE — 93010 ELECTROCARDIOGRAM REPORT: CPT | Performed by: INTERNAL MEDICINE

## 2019-10-17 PROCEDURE — 36415 COLL VENOUS BLD VENIPUNCTURE: CPT

## 2019-10-17 PROCEDURE — 2709999900 HC NON-CHARGEABLE SUPPLY

## 2019-10-17 PROCEDURE — 82948 REAGENT STRIP/BLOOD GLUCOSE: CPT

## 2019-10-17 PROCEDURE — 6370000000 HC RX 637 (ALT 250 FOR IP): Performed by: INTERNAL MEDICINE

## 2019-10-17 PROCEDURE — 83036 HEMOGLOBIN GLYCOSYLATED A1C: CPT

## 2019-10-17 PROCEDURE — 2580000003 HC RX 258: Performed by: FAMILY MEDICINE

## 2019-10-17 PROCEDURE — 6360000002 HC RX W HCPCS: Performed by: FAMILY MEDICINE

## 2019-10-17 PROCEDURE — 85025 COMPLETE CBC W/AUTO DIFF WBC: CPT

## 2019-10-17 PROCEDURE — 94640 AIRWAY INHALATION TREATMENT: CPT

## 2019-10-17 PROCEDURE — 2700000000 HC OXYGEN THERAPY PER DAY

## 2019-10-17 PROCEDURE — 94761 N-INVAS EAR/PLS OXIMETRY MLT: CPT

## 2019-10-17 PROCEDURE — 6370000000 HC RX 637 (ALT 250 FOR IP): Performed by: FAMILY MEDICINE

## 2019-10-17 PROCEDURE — 80048 BASIC METABOLIC PNL TOTAL CA: CPT

## 2019-10-17 PROCEDURE — 99223 1ST HOSP IP/OBS HIGH 75: CPT | Performed by: FAMILY MEDICINE

## 2019-10-17 PROCEDURE — 94669 MECHANICAL CHEST WALL OSCILL: CPT

## 2019-10-17 PROCEDURE — 71046 X-RAY EXAM CHEST 2 VIEWS: CPT

## 2019-10-17 PROCEDURE — 84145 PROCALCITONIN (PCT): CPT

## 2019-10-17 PROCEDURE — 1200000003 HC TELEMETRY R&B

## 2019-10-17 PROCEDURE — 83735 ASSAY OF MAGNESIUM: CPT

## 2019-10-17 RX ORDER — TRAMADOL HYDROCHLORIDE 50 MG/1
50 TABLET ORAL EVERY 6 HOURS PRN
Status: DISCONTINUED | OUTPATIENT
Start: 2019-10-17 | End: 2019-10-20 | Stop reason: HOSPADM

## 2019-10-17 RX ORDER — GUAIFENESIN 600 MG/1
600 TABLET, EXTENDED RELEASE ORAL 2 TIMES DAILY
Status: DISCONTINUED | OUTPATIENT
Start: 2019-10-17 | End: 2019-10-17

## 2019-10-17 RX ORDER — SODIUM CHLORIDE 0.9 % (FLUSH) 0.9 %
10 SYRINGE (ML) INJECTION PRN
Status: DISCONTINUED | OUTPATIENT
Start: 2019-10-17 | End: 2019-10-20 | Stop reason: HOSPADM

## 2019-10-17 RX ORDER — 0.9 % SODIUM CHLORIDE 0.9 %
500 INTRAVENOUS SOLUTION INTRAVENOUS ONCE
Status: COMPLETED | OUTPATIENT
Start: 2019-10-17 | End: 2019-10-17

## 2019-10-17 RX ORDER — ACETAMINOPHEN 325 MG/1
650 TABLET ORAL EVERY 4 HOURS PRN
Status: DISCONTINUED | OUTPATIENT
Start: 2019-10-17 | End: 2019-10-20 | Stop reason: HOSPADM

## 2019-10-17 RX ORDER — 0.9 % SODIUM CHLORIDE 0.9 %
1000 INTRAVENOUS SOLUTION INTRAVENOUS ONCE
Status: COMPLETED | OUTPATIENT
Start: 2019-10-17 | End: 2019-10-18

## 2019-10-17 RX ORDER — NICOTINE POLACRILEX 4 MG
15 LOZENGE BUCCAL PRN
Status: DISCONTINUED | OUTPATIENT
Start: 2019-10-17 | End: 2019-10-20 | Stop reason: HOSPADM

## 2019-10-17 RX ORDER — IPRATROPIUM BROMIDE AND ALBUTEROL SULFATE 2.5; .5 MG/3ML; MG/3ML
1 SOLUTION RESPIRATORY (INHALATION)
Status: DISCONTINUED | OUTPATIENT
Start: 2019-10-17 | End: 2019-10-17

## 2019-10-17 RX ORDER — NICOTINE 21 MG/24HR
1 PATCH, TRANSDERMAL 24 HOURS TRANSDERMAL DAILY
Status: DISCONTINUED | OUTPATIENT
Start: 2019-10-17 | End: 2019-10-20 | Stop reason: HOSPADM

## 2019-10-17 RX ORDER — PREDNISONE 20 MG/1
40 TABLET ORAL DAILY
Status: DISCONTINUED | OUTPATIENT
Start: 2019-10-17 | End: 2019-10-17

## 2019-10-17 RX ORDER — POTASSIUM CHLORIDE 20 MEQ/1
40 TABLET, EXTENDED RELEASE ORAL ONCE
Status: COMPLETED | OUTPATIENT
Start: 2019-10-17 | End: 2019-10-17

## 2019-10-17 RX ORDER — SODIUM CHLORIDE 9 MG/ML
INJECTION, SOLUTION INTRAVENOUS CONTINUOUS
Status: DISCONTINUED | OUTPATIENT
Start: 2019-10-17 | End: 2019-10-17

## 2019-10-17 RX ORDER — DEXTROSE MONOHYDRATE 25 G/50ML
12.5 INJECTION, SOLUTION INTRAVENOUS PRN
Status: DISCONTINUED | OUTPATIENT
Start: 2019-10-17 | End: 2019-10-20 | Stop reason: HOSPADM

## 2019-10-17 RX ORDER — POTASSIUM CHLORIDE 20 MEQ/1
20 TABLET, EXTENDED RELEASE ORAL 2 TIMES DAILY WITH MEALS
Status: DISCONTINUED | OUTPATIENT
Start: 2019-10-17 | End: 2019-10-20 | Stop reason: HOSPADM

## 2019-10-17 RX ORDER — LEVALBUTEROL INHALATION SOLUTION 1.25 MG/3ML
1.25 SOLUTION RESPIRATORY (INHALATION)
Status: DISCONTINUED | OUTPATIENT
Start: 2019-10-17 | End: 2019-10-18

## 2019-10-17 RX ORDER — SODIUM CHLORIDE 0.9 % (FLUSH) 0.9 %
10 SYRINGE (ML) INJECTION EVERY 12 HOURS SCHEDULED
Status: DISCONTINUED | OUTPATIENT
Start: 2019-10-17 | End: 2019-10-20 | Stop reason: HOSPADM

## 2019-10-17 RX ORDER — METHYLPREDNISOLONE SODIUM SUCCINATE 40 MG/ML
40 INJECTION, POWDER, LYOPHILIZED, FOR SOLUTION INTRAMUSCULAR; INTRAVENOUS EVERY 8 HOURS
Status: COMPLETED | OUTPATIENT
Start: 2019-10-17 | End: 2019-10-20

## 2019-10-17 RX ORDER — DEXTROSE MONOHYDRATE 50 MG/ML
100 INJECTION, SOLUTION INTRAVENOUS PRN
Status: DISCONTINUED | OUTPATIENT
Start: 2019-10-17 | End: 2019-10-20 | Stop reason: HOSPADM

## 2019-10-17 RX ORDER — MAGNESIUM SULFATE IN WATER 40 MG/ML
2 INJECTION, SOLUTION INTRAVENOUS ONCE
Status: COMPLETED | OUTPATIENT
Start: 2019-10-17 | End: 2019-10-17

## 2019-10-17 RX ORDER — SODIUM CHLORIDE 9 MG/ML
INJECTION, SOLUTION INTRAVENOUS CONTINUOUS
Status: DISCONTINUED | OUTPATIENT
Start: 2019-10-17 | End: 2019-10-20

## 2019-10-17 RX ORDER — LEVOFLOXACIN 750 MG/1
750 TABLET ORAL DAILY
Status: DISCONTINUED | OUTPATIENT
Start: 2019-10-17 | End: 2019-10-18

## 2019-10-17 RX ADMIN — IPRATROPIUM BROMIDE 0.5 MG: 0.5 SOLUTION RESPIRATORY (INHALATION) at 21:53

## 2019-10-17 RX ADMIN — IPRATROPIUM BROMIDE AND ALBUTEROL SULFATE 1 AMPULE: .5; 3 SOLUTION RESPIRATORY (INHALATION) at 07:54

## 2019-10-17 RX ADMIN — IPRATROPIUM BROMIDE 0.5 MG: 0.5 SOLUTION RESPIRATORY (INHALATION) at 04:27

## 2019-10-17 RX ADMIN — TRAMADOL HYDROCHLORIDE 50 MG: 50 TABLET ORAL at 16:34

## 2019-10-17 RX ADMIN — LEVOFLOXACIN 750 MG: 750 TABLET, FILM COATED ORAL at 20:53

## 2019-10-17 RX ADMIN — INSULIN LISPRO 1 UNITS: 100 INJECTION, SOLUTION INTRAVENOUS; SUBCUTANEOUS at 21:00

## 2019-10-17 RX ADMIN — IPRATROPIUM BROMIDE AND ALBUTEROL SULFATE 1 AMPULE: .5; 3 SOLUTION RESPIRATORY (INHALATION) at 15:38

## 2019-10-17 RX ADMIN — SODIUM CHLORIDE 500 ML: 9 INJECTION, SOLUTION INTRAVENOUS at 06:12

## 2019-10-17 RX ADMIN — INSULIN LISPRO 6 UNITS: 100 INJECTION, SOLUTION INTRAVENOUS; SUBCUTANEOUS at 17:59

## 2019-10-17 RX ADMIN — INSULIN LISPRO 2 UNITS: 100 INJECTION, SOLUTION INTRAVENOUS; SUBCUTANEOUS at 08:51

## 2019-10-17 RX ADMIN — MAGNESIUM SULFATE HEPTAHYDRATE 2 G: 40 INJECTION, SOLUTION INTRAVENOUS at 06:40

## 2019-10-17 RX ADMIN — METHYLPREDNISOLONE SODIUM SUCCINATE 40 MG: 40 INJECTION, POWDER, FOR SOLUTION INTRAMUSCULAR; INTRAVENOUS at 23:13

## 2019-10-17 RX ADMIN — Medication 10 ML: at 08:46

## 2019-10-17 RX ADMIN — GUAIFENESIN AND DEXTROMETHORPHAN HYDROBROMIDE 1 TABLET: 600; 30 TABLET, EXTENDED RELEASE ORAL at 20:53

## 2019-10-17 RX ADMIN — SODIUM CHLORIDE 1000 ML: 9 INJECTION, SOLUTION INTRAVENOUS at 23:14

## 2019-10-17 RX ADMIN — PHENOL 1 SPRAY: 1.4 SPRAY ORAL at 14:48

## 2019-10-17 RX ADMIN — SODIUM CHLORIDE: 9 INJECTION, SOLUTION INTRAVENOUS at 02:07

## 2019-10-17 RX ADMIN — POTASSIUM CHLORIDE 20 MEQ: 20 TABLET, EXTENDED RELEASE ORAL at 16:38

## 2019-10-17 RX ADMIN — METHYLPREDNISOLONE SODIUM SUCCINATE 40 MG: 40 INJECTION, POWDER, FOR SOLUTION INTRAMUSCULAR; INTRAVENOUS at 16:35

## 2019-10-17 RX ADMIN — METHYLPREDNISOLONE SODIUM SUCCINATE 40 MG: 40 INJECTION, POWDER, FOR SOLUTION INTRAMUSCULAR; INTRAVENOUS at 08:46

## 2019-10-17 RX ADMIN — IPRATROPIUM BROMIDE AND ALBUTEROL SULFATE 1 AMPULE: .5; 3 SOLUTION RESPIRATORY (INHALATION) at 11:49

## 2019-10-17 RX ADMIN — LEVALBUTEROL HYDROCHLORIDE 1.25 MG: 1.25 SOLUTION RESPIRATORY (INHALATION) at 21:53

## 2019-10-17 RX ADMIN — POTASSIUM CHLORIDE 20 MEQ: 20 TABLET, EXTENDED RELEASE ORAL at 08:46

## 2019-10-17 RX ADMIN — GUAIFENESIN AND DEXTROMETHORPHAN HYDROBROMIDE 1 TABLET: 600; 30 TABLET, EXTENDED RELEASE ORAL at 08:46

## 2019-10-17 RX ADMIN — INSULIN LISPRO 2 UNITS: 100 INJECTION, SOLUTION INTRAVENOUS; SUBCUTANEOUS at 13:36

## 2019-10-17 RX ADMIN — POTASSIUM CHLORIDE 40 MEQ: 1500 TABLET, EXTENDED RELEASE ORAL at 06:10

## 2019-10-17 ASSESSMENT — ENCOUNTER SYMPTOMS
BACK PAIN: 0
RHINORRHEA: 0

## 2019-10-17 ASSESSMENT — PAIN SCALES - GENERAL
PAINLEVEL_OUTOF10: 0
PAINLEVEL_OUTOF10: 3
PAINLEVEL_OUTOF10: 6
PAINLEVEL_OUTOF10: 0

## 2019-10-17 ASSESSMENT — PULMONARY FUNCTION TESTS: PEFR_L/MIN: 90

## 2019-10-18 ENCOUNTER — APPOINTMENT (OUTPATIENT)
Dept: CT IMAGING | Age: 39
DRG: 871 | End: 2019-10-18

## 2019-10-18 LAB
ANION GAP SERPL CALCULATED.3IONS-SCNC: 16 MEQ/L (ref 8–16)
BASOPHILS # BLD: 0.2 %
BASOPHILS ABSOLUTE: 0.1 THOU/MM3 (ref 0–0.1)
BUN BLDV-MCNC: 10 MG/DL (ref 7–22)
CALCIUM SERPL-MCNC: 8.8 MG/DL (ref 8.5–10.5)
CHLORIDE BLD-SCNC: 102 MEQ/L (ref 98–111)
CO2: 21 MEQ/L (ref 23–33)
CREAT SERPL-MCNC: 0.6 MG/DL (ref 0.4–1.2)
EOSINOPHIL # BLD: 0.5 %
EOSINOPHILS ABSOLUTE: 0.1 THOU/MM3 (ref 0–0.4)
ERYTHROCYTE [DISTWIDTH] IN BLOOD BY AUTOMATED COUNT: 12.7 % (ref 11.5–14.5)
ERYTHROCYTE [DISTWIDTH] IN BLOOD BY AUTOMATED COUNT: 42.5 FL (ref 35–45)
GFR SERPL CREATININE-BSD FRML MDRD: > 90 ML/MIN/1.73M2
GLUCOSE BLD-MCNC: 124 MG/DL (ref 70–108)
GLUCOSE BLD-MCNC: 128 MG/DL (ref 70–108)
GLUCOSE BLD-MCNC: 141 MG/DL (ref 70–108)
GLUCOSE BLD-MCNC: 166 MG/DL (ref 70–108)
GLUCOSE BLD-MCNC: 168 MG/DL (ref 70–108)
GROUP A STREP CULTURE, REFLEX: NEGATIVE
HCT VFR BLD CALC: 41.3 % (ref 37–47)
HEMOGLOBIN: 13.7 GM/DL (ref 12–16)
IMMATURE GRANS (ABS): 0.44 THOU/MM3 (ref 0–0.07)
IMMATURE GRANULOCYTES: 1.5 %
LYMPHOCYTES # BLD: 6.6 %
LYMPHOCYTES ABSOLUTE: 1.9 THOU/MM3 (ref 1–4.8)
MAGNESIUM: 1.2 MG/DL (ref 1.6–2.4)
MCH RBC QN AUTO: 30.4 PG (ref 26–33)
MCHC RBC AUTO-ENTMCNC: 33.2 GM/DL (ref 32.2–35.5)
MCV RBC AUTO: 91.8 FL (ref 81–99)
MONOCYTES # BLD: 3.9 %
MONOCYTES ABSOLUTE: 1.1 THOU/MM3 (ref 0.4–1.3)
NUCLEATED RED BLOOD CELLS: 0 /100 WBC
PLATELET # BLD: 182 THOU/MM3 (ref 130–400)
PLATELET ESTIMATE: ADEQUATE
PMV BLD AUTO: 10.2 FL (ref 9.4–12.4)
POTASSIUM REFLEX MAGNESIUM: 3.6 MEQ/L (ref 3.5–5.2)
RBC # BLD: 4.5 MILL/MM3 (ref 4.2–5.4)
REFLEX THROAT C + S: NORMAL
SCAN OF BLOOD SMEAR: NORMAL
SEG NEUTROPHILS: 87.3 %
SEGMENTED NEUTROPHILS ABSOLUTE COUNT: 24.9 THOU/MM3 (ref 1.8–7.7)
SODIUM BLD-SCNC: 139 MEQ/L (ref 135–145)
WBC # BLD: 28.5 THOU/MM3 (ref 4.8–10.8)

## 2019-10-18 PROCEDURE — 1200000003 HC TELEMETRY R&B

## 2019-10-18 PROCEDURE — 99232 SBSQ HOSP IP/OBS MODERATE 35: CPT | Performed by: INTERNAL MEDICINE

## 2019-10-18 PROCEDURE — 82948 REAGENT STRIP/BLOOD GLUCOSE: CPT

## 2019-10-18 PROCEDURE — 6370000000 HC RX 637 (ALT 250 FOR IP): Performed by: INTERNAL MEDICINE

## 2019-10-18 PROCEDURE — 6360000002 HC RX W HCPCS: Performed by: FAMILY MEDICINE

## 2019-10-18 PROCEDURE — 2580000003 HC RX 258: Performed by: FAMILY MEDICINE

## 2019-10-18 PROCEDURE — 99252 IP/OBS CONSLTJ NEW/EST SF 35: CPT | Performed by: PHYSICIAN ASSISTANT

## 2019-10-18 PROCEDURE — 87070 CULTURE OTHR SPECIMN AEROBIC: CPT

## 2019-10-18 PROCEDURE — 2709999900 HC NON-CHARGEABLE SUPPLY

## 2019-10-18 PROCEDURE — 6360000002 HC RX W HCPCS: Performed by: INTERNAL MEDICINE

## 2019-10-18 PROCEDURE — 36415 COLL VENOUS BLD VENIPUNCTURE: CPT

## 2019-10-18 PROCEDURE — 2580000003 HC RX 258: Performed by: INTERNAL MEDICINE

## 2019-10-18 PROCEDURE — 6360000004 HC RX CONTRAST MEDICATION: Performed by: INTERNAL MEDICINE

## 2019-10-18 PROCEDURE — 94760 N-INVAS EAR/PLS OXIMETRY 1: CPT

## 2019-10-18 PROCEDURE — 87880 STREP A ASSAY W/OPTIC: CPT

## 2019-10-18 PROCEDURE — 83735 ASSAY OF MAGNESIUM: CPT

## 2019-10-18 PROCEDURE — 80048 BASIC METABOLIC PNL TOTAL CA: CPT

## 2019-10-18 PROCEDURE — 70491 CT SOFT TISSUE NECK W/DYE: CPT

## 2019-10-18 PROCEDURE — 85025 COMPLETE CBC W/AUTO DIFF WBC: CPT

## 2019-10-18 PROCEDURE — 94669 MECHANICAL CHEST WALL OSCILL: CPT

## 2019-10-18 PROCEDURE — 94640 AIRWAY INHALATION TREATMENT: CPT

## 2019-10-18 RX ORDER — MAGNESIUM SULFATE IN WATER 40 MG/ML
4 INJECTION, SOLUTION INTRAVENOUS ONCE
Status: COMPLETED | OUTPATIENT
Start: 2019-10-18 | End: 2019-10-18

## 2019-10-18 RX ADMIN — POTASSIUM CHLORIDE 20 MEQ: 20 TABLET, EXTENDED RELEASE ORAL at 10:10

## 2019-10-18 RX ADMIN — ACETAMINOPHEN 650 MG: 325 TABLET ORAL at 20:39

## 2019-10-18 RX ADMIN — IOPAMIDOL 80 ML: 755 INJECTION, SOLUTION INTRAVENOUS at 08:27

## 2019-10-18 RX ADMIN — IPRATROPIUM BROMIDE 0.5 MG: 0.5 SOLUTION RESPIRATORY (INHALATION) at 15:50

## 2019-10-18 RX ADMIN — IPRATROPIUM BROMIDE 0.5 MG: 0.5 SOLUTION RESPIRATORY (INHALATION) at 12:22

## 2019-10-18 RX ADMIN — IPRATROPIUM BROMIDE 0.5 MG: 0.5 SOLUTION RESPIRATORY (INHALATION) at 08:47

## 2019-10-18 RX ADMIN — PIPERACILLIN AND TAZOBACTAM 3.38 G: 3; .375 INJECTION, POWDER, LYOPHILIZED, FOR SOLUTION INTRAVENOUS at 17:53

## 2019-10-18 RX ADMIN — GUAIFENESIN AND DEXTROMETHORPHAN HYDROBROMIDE 1 TABLET: 600; 30 TABLET, EXTENDED RELEASE ORAL at 20:39

## 2019-10-18 RX ADMIN — GUAIFENESIN AND DEXTROMETHORPHAN HYDROBROMIDE 1 TABLET: 600; 30 TABLET, EXTENDED RELEASE ORAL at 10:07

## 2019-10-18 RX ADMIN — POTASSIUM CHLORIDE 20 MEQ: 20 TABLET, EXTENDED RELEASE ORAL at 18:03

## 2019-10-18 RX ADMIN — METHYLPREDNISOLONE SODIUM SUCCINATE 40 MG: 40 INJECTION, POWDER, FOR SOLUTION INTRAMUSCULAR; INTRAVENOUS at 17:53

## 2019-10-18 RX ADMIN — Medication 3.3 MG: at 10:07

## 2019-10-18 RX ADMIN — IPRATROPIUM BROMIDE 0.5 MG: 0.5 SOLUTION RESPIRATORY (INHALATION) at 21:49

## 2019-10-18 RX ADMIN — METHYLPREDNISOLONE SODIUM SUCCINATE 40 MG: 40 INJECTION, POWDER, FOR SOLUTION INTRAMUSCULAR; INTRAVENOUS at 10:06

## 2019-10-18 RX ADMIN — MAGNESIUM SULFATE HEPTAHYDRATE 4 G: 40 INJECTION, SOLUTION INTRAVENOUS at 10:07

## 2019-10-18 RX ADMIN — INSULIN LISPRO 2 UNITS: 100 INJECTION, SOLUTION INTRAVENOUS; SUBCUTANEOUS at 13:19

## 2019-10-18 RX ADMIN — SODIUM CHLORIDE: 9 INJECTION, SOLUTION INTRAVENOUS at 18:03

## 2019-10-18 RX ADMIN — PIPERACILLIN AND TAZOBACTAM 3.38 G: 3; .375 INJECTION, POWDER, LYOPHILIZED, FOR SOLUTION INTRAVENOUS at 10:07

## 2019-10-18 RX ADMIN — SODIUM CHLORIDE: 9 INJECTION, SOLUTION INTRAVENOUS at 04:27

## 2019-10-18 ASSESSMENT — PAIN DESCRIPTION - PROGRESSION: CLINICAL_PROGRESSION: NOT CHANGED

## 2019-10-18 ASSESSMENT — PAIN DESCRIPTION - DESCRIPTORS
DESCRIPTORS: ACHING
DESCRIPTORS: SORE

## 2019-10-18 ASSESSMENT — PAIN DESCRIPTION - FREQUENCY: FREQUENCY: CONTINUOUS

## 2019-10-18 ASSESSMENT — PAIN DESCRIPTION - PAIN TYPE: TYPE: ACUTE PAIN

## 2019-10-18 ASSESSMENT — PAIN DESCRIPTION - ONSET: ONSET: ON-GOING

## 2019-10-18 ASSESSMENT — PAIN - FUNCTIONAL ASSESSMENT: PAIN_FUNCTIONAL_ASSESSMENT: ACTIVITIES ARE NOT PREVENTED

## 2019-10-18 ASSESSMENT — PAIN SCALES - GENERAL
PAINLEVEL_OUTOF10: 0
PAINLEVEL_OUTOF10: 3
PAINLEVEL_OUTOF10: 8

## 2019-10-18 ASSESSMENT — PAIN DESCRIPTION - LOCATION
LOCATION: HEAD
LOCATION: THROAT

## 2019-10-18 ASSESSMENT — PAIN DESCRIPTION - ORIENTATION: ORIENTATION: RIGHT;LEFT

## 2019-10-19 ENCOUNTER — APPOINTMENT (OUTPATIENT)
Dept: CT IMAGING | Age: 39
DRG: 871 | End: 2019-10-19

## 2019-10-19 LAB
ANION GAP SERPL CALCULATED.3IONS-SCNC: 10 MEQ/L (ref 8–16)
BASOPHILS # BLD: 0.2 %
BASOPHILS ABSOLUTE: 0.1 THOU/MM3 (ref 0–0.1)
BUN BLDV-MCNC: 17 MG/DL (ref 7–22)
CALCIUM SERPL-MCNC: 9.4 MG/DL (ref 8.5–10.5)
CHLORIDE BLD-SCNC: 100 MEQ/L (ref 98–111)
CO2: 28 MEQ/L (ref 23–33)
CREAT SERPL-MCNC: 0.7 MG/DL (ref 0.4–1.2)
EOSINOPHIL # BLD: 0.2 %
EOSINOPHILS ABSOLUTE: 0.1 THOU/MM3 (ref 0–0.4)
ERYTHROCYTE [DISTWIDTH] IN BLOOD BY AUTOMATED COUNT: 12.8 % (ref 11.5–14.5)
ERYTHROCYTE [DISTWIDTH] IN BLOOD BY AUTOMATED COUNT: 43.7 FL (ref 35–45)
GFR SERPL CREATININE-BSD FRML MDRD: > 90 ML/MIN/1.73M2
GLUCOSE BLD-MCNC: 156 MG/DL (ref 70–108)
GLUCOSE BLD-MCNC: 165 MG/DL (ref 70–108)
GLUCOSE BLD-MCNC: 205 MG/DL (ref 70–108)
GLUCOSE BLD-MCNC: 221 MG/DL (ref 70–108)
GLUCOSE BLD-MCNC: 260 MG/DL (ref 70–108)
GLUCOSE BLD-MCNC: 282 MG/DL (ref 70–108)
HCT VFR BLD CALC: 43.5 % (ref 37–47)
HEMOGLOBIN: 14.1 GM/DL (ref 12–16)
IMMATURE GRANS (ABS): 0.32 THOU/MM3 (ref 0–0.07)
IMMATURE GRANULOCYTES: 1.2 %
LYMPHOCYTES # BLD: 8.8 %
LYMPHOCYTES ABSOLUTE: 2.3 THOU/MM3 (ref 1–4.8)
MAGNESIUM: 1.7 MG/DL (ref 1.6–2.4)
MCH RBC QN AUTO: 30.2 PG (ref 26–33)
MCHC RBC AUTO-ENTMCNC: 32.4 GM/DL (ref 32.2–35.5)
MCV RBC AUTO: 93.1 FL (ref 81–99)
MONOCYTES # BLD: 2.6 %
MONOCYTES ABSOLUTE: 0.7 THOU/MM3 (ref 0.4–1.3)
NUCLEATED RED BLOOD CELLS: 0 /100 WBC
PLATELET # BLD: 192 THOU/MM3 (ref 130–400)
PMV BLD AUTO: 9.9 FL (ref 9.4–12.4)
POTASSIUM REFLEX MAGNESIUM: 4.7 MEQ/L (ref 3.5–5.2)
RBC # BLD: 4.67 MILL/MM3 (ref 4.2–5.4)
SEG NEUTROPHILS: 87 %
SEGMENTED NEUTROPHILS ABSOLUTE COUNT: 22.4 THOU/MM3 (ref 1.8–7.7)
SODIUM BLD-SCNC: 138 MEQ/L (ref 135–145)
WBC # BLD: 25.7 THOU/MM3 (ref 4.8–10.8)

## 2019-10-19 PROCEDURE — 36415 COLL VENOUS BLD VENIPUNCTURE: CPT

## 2019-10-19 PROCEDURE — 1200000003 HC TELEMETRY R&B

## 2019-10-19 PROCEDURE — 94640 AIRWAY INHALATION TREATMENT: CPT

## 2019-10-19 PROCEDURE — 83735 ASSAY OF MAGNESIUM: CPT

## 2019-10-19 PROCEDURE — 6370000000 HC RX 637 (ALT 250 FOR IP): Performed by: INTERNAL MEDICINE

## 2019-10-19 PROCEDURE — 99232 SBSQ HOSP IP/OBS MODERATE 35: CPT | Performed by: INTERNAL MEDICINE

## 2019-10-19 PROCEDURE — 2580000003 HC RX 258: Performed by: INTERNAL MEDICINE

## 2019-10-19 PROCEDURE — 80048 BASIC METABOLIC PNL TOTAL CA: CPT

## 2019-10-19 PROCEDURE — 85025 COMPLETE CBC W/AUTO DIFF WBC: CPT

## 2019-10-19 PROCEDURE — 82948 REAGENT STRIP/BLOOD GLUCOSE: CPT

## 2019-10-19 PROCEDURE — 6360000004 HC RX CONTRAST MEDICATION: Performed by: INTERNAL MEDICINE

## 2019-10-19 PROCEDURE — 94760 N-INVAS EAR/PLS OXIMETRY 1: CPT

## 2019-10-19 PROCEDURE — 6360000002 HC RX W HCPCS: Performed by: INTERNAL MEDICINE

## 2019-10-19 PROCEDURE — 6360000002 HC RX W HCPCS: Performed by: FAMILY MEDICINE

## 2019-10-19 PROCEDURE — 71260 CT THORAX DX C+: CPT

## 2019-10-19 RX ADMIN — PIPERACILLIN AND TAZOBACTAM 3.38 G: 3; .375 INJECTION, POWDER, LYOPHILIZED, FOR SOLUTION INTRAVENOUS at 03:26

## 2019-10-19 RX ADMIN — IPRATROPIUM BROMIDE 0.5 MG: 0.5 SOLUTION RESPIRATORY (INHALATION) at 08:32

## 2019-10-19 RX ADMIN — IPRATROPIUM BROMIDE 0.5 MG: 0.5 SOLUTION RESPIRATORY (INHALATION) at 11:38

## 2019-10-19 RX ADMIN — INSULIN LISPRO 4 UNITS: 100 INJECTION, SOLUTION INTRAVENOUS; SUBCUTANEOUS at 17:48

## 2019-10-19 RX ADMIN — IPRATROPIUM BROMIDE 0.5 MG: 0.5 SOLUTION RESPIRATORY (INHALATION) at 21:04

## 2019-10-19 RX ADMIN — METHYLPREDNISOLONE SODIUM SUCCINATE 40 MG: 40 INJECTION, POWDER, FOR SOLUTION INTRAMUSCULAR; INTRAVENOUS at 00:16

## 2019-10-19 RX ADMIN — INSULIN LISPRO 6 UNITS: 100 INJECTION, SOLUTION INTRAVENOUS; SUBCUTANEOUS at 13:09

## 2019-10-19 RX ADMIN — METHYLPREDNISOLONE SODIUM SUCCINATE 40 MG: 40 INJECTION, POWDER, FOR SOLUTION INTRAMUSCULAR; INTRAVENOUS at 16:17

## 2019-10-19 RX ADMIN — IPRATROPIUM BROMIDE 0.5 MG: 0.5 SOLUTION RESPIRATORY (INHALATION) at 15:41

## 2019-10-19 RX ADMIN — PIPERACILLIN AND TAZOBACTAM 3.38 G: 3; .375 INJECTION, POWDER, LYOPHILIZED, FOR SOLUTION INTRAVENOUS at 10:24

## 2019-10-19 RX ADMIN — GUAIFENESIN AND DEXTROMETHORPHAN HYDROBROMIDE 1 TABLET: 600; 30 TABLET, EXTENDED RELEASE ORAL at 21:39

## 2019-10-19 RX ADMIN — PIPERACILLIN AND TAZOBACTAM 3.38 G: 3; .375 INJECTION, POWDER, LYOPHILIZED, FOR SOLUTION INTRAVENOUS at 17:48

## 2019-10-19 RX ADMIN — SODIUM CHLORIDE: 9 INJECTION, SOLUTION INTRAVENOUS at 10:23

## 2019-10-19 RX ADMIN — POTASSIUM CHLORIDE 20 MEQ: 20 TABLET, EXTENDED RELEASE ORAL at 16:17

## 2019-10-19 RX ADMIN — INSULIN LISPRO 2 UNITS: 100 INJECTION, SOLUTION INTRAVENOUS; SUBCUTANEOUS at 21:39

## 2019-10-19 RX ADMIN — POTASSIUM CHLORIDE 20 MEQ: 20 TABLET, EXTENDED RELEASE ORAL at 09:05

## 2019-10-19 RX ADMIN — METHYLPREDNISOLONE SODIUM SUCCINATE 40 MG: 40 INJECTION, POWDER, FOR SOLUTION INTRAMUSCULAR; INTRAVENOUS at 09:04

## 2019-10-19 RX ADMIN — ACETAMINOPHEN 650 MG: 325 TABLET ORAL at 12:38

## 2019-10-19 RX ADMIN — IOPAMIDOL 80 ML: 755 INJECTION, SOLUTION INTRAVENOUS at 08:12

## 2019-10-19 RX ADMIN — GUAIFENESIN AND DEXTROMETHORPHAN HYDROBROMIDE 1 TABLET: 600; 30 TABLET, EXTENDED RELEASE ORAL at 09:04

## 2019-10-19 RX ADMIN — INSULIN LISPRO 2 UNITS: 100 INJECTION, SOLUTION INTRAVENOUS; SUBCUTANEOUS at 09:06

## 2019-10-19 ASSESSMENT — PAIN SCALES - GENERAL
PAINLEVEL_OUTOF10: 0
PAINLEVEL_OUTOF10: 0
PAINLEVEL_OUTOF10: 3
PAINLEVEL_OUTOF10: 0
PAINLEVEL_OUTOF10: 0
PAINLEVEL_OUTOF10: 2

## 2019-10-19 ASSESSMENT — PAIN SCALES - WONG BAKER: WONGBAKER_NUMERICALRESPONSE: 0

## 2019-10-20 VITALS
WEIGHT: 154.3 LBS | HEART RATE: 92 BPM | OXYGEN SATURATION: 93 % | HEIGHT: 64 IN | RESPIRATION RATE: 18 BRPM | TEMPERATURE: 98.7 F | BODY MASS INDEX: 26.34 KG/M2 | DIASTOLIC BLOOD PRESSURE: 85 MMHG | SYSTOLIC BLOOD PRESSURE: 125 MMHG

## 2019-10-20 LAB
ANION GAP SERPL CALCULATED.3IONS-SCNC: 15 MEQ/L (ref 8–16)
ATYPICAL LYMPHOCYTES: ABNORMAL %
BASOPHILS # BLD: 0.2 %
BASOPHILS ABSOLUTE: 0.1 THOU/MM3 (ref 0–0.1)
BUN BLDV-MCNC: 19 MG/DL (ref 7–22)
CALCIUM SERPL-MCNC: 9.7 MG/DL (ref 8.5–10.5)
CHLORIDE BLD-SCNC: 103 MEQ/L (ref 98–111)
CO2: 24 MEQ/L (ref 23–33)
CREAT SERPL-MCNC: 0.7 MG/DL (ref 0.4–1.2)
EOSINOPHIL # BLD: 0.2 %
EOSINOPHILS ABSOLUTE: 0.1 THOU/MM3 (ref 0–0.4)
ERYTHROCYTE [DISTWIDTH] IN BLOOD BY AUTOMATED COUNT: 12.6 % (ref 11.5–14.5)
ERYTHROCYTE [DISTWIDTH] IN BLOOD BY AUTOMATED COUNT: 42.9 FL (ref 35–45)
GFR SERPL CREATININE-BSD FRML MDRD: > 90 ML/MIN/1.73M2
GLUCOSE BLD-MCNC: 149 MG/DL (ref 70–108)
GLUCOSE BLD-MCNC: 165 MG/DL (ref 70–108)
GLUCOSE BLD-MCNC: 217 MG/DL (ref 70–108)
HCT VFR BLD CALC: 44.2 % (ref 37–47)
HEMOGLOBIN: 14.6 GM/DL (ref 12–16)
IMMATURE GRANS (ABS): 0.64 THOU/MM3 (ref 0–0.07)
IMMATURE GRANULOCYTES: 2.4 %
LYMPHOCYTES # BLD: 9.5 %
LYMPHOCYTES ABSOLUTE: 2.5 THOU/MM3 (ref 1–4.8)
MAGNESIUM: 1.5 MG/DL (ref 1.6–2.4)
MCH RBC QN AUTO: 30.5 PG (ref 26–33)
MCHC RBC AUTO-ENTMCNC: 33 GM/DL (ref 32.2–35.5)
MCV RBC AUTO: 92.3 FL (ref 81–99)
MONOCYTES # BLD: 3.9 %
MONOCYTES ABSOLUTE: 1 THOU/MM3 (ref 0.4–1.3)
NUCLEATED RED BLOOD CELLS: 0 /100 WBC
PLATELET # BLD: 225 THOU/MM3 (ref 130–400)
PLATELET ESTIMATE: ADEQUATE
PMV BLD AUTO: 9.9 FL (ref 9.4–12.4)
POTASSIUM REFLEX MAGNESIUM: 4.3 MEQ/L (ref 3.5–5.2)
RBC # BLD: 4.79 MILL/MM3 (ref 4.2–5.4)
SCAN OF BLOOD SMEAR: NORMAL
SEG NEUTROPHILS: 83.8 %
SEGMENTED NEUTROPHILS ABSOLUTE COUNT: 22.1 THOU/MM3 (ref 1.8–7.7)
SODIUM BLD-SCNC: 142 MEQ/L (ref 135–145)
THROAT/NOSE CULTURE: NORMAL
WBC # BLD: 26.4 THOU/MM3 (ref 4.8–10.8)

## 2019-10-20 PROCEDURE — 85025 COMPLETE CBC W/AUTO DIFF WBC: CPT

## 2019-10-20 PROCEDURE — 2709999900 HC NON-CHARGEABLE SUPPLY

## 2019-10-20 PROCEDURE — 94640 AIRWAY INHALATION TREATMENT: CPT

## 2019-10-20 PROCEDURE — 82948 REAGENT STRIP/BLOOD GLUCOSE: CPT

## 2019-10-20 PROCEDURE — 36415 COLL VENOUS BLD VENIPUNCTURE: CPT

## 2019-10-20 PROCEDURE — 99239 HOSP IP/OBS DSCHRG MGMT >30: CPT | Performed by: INTERNAL MEDICINE

## 2019-10-20 PROCEDURE — 6360000002 HC RX W HCPCS: Performed by: INTERNAL MEDICINE

## 2019-10-20 PROCEDURE — 94760 N-INVAS EAR/PLS OXIMETRY 1: CPT

## 2019-10-20 PROCEDURE — 2580000003 HC RX 258: Performed by: INTERNAL MEDICINE

## 2019-10-20 PROCEDURE — 6370000000 HC RX 637 (ALT 250 FOR IP): Performed by: INTERNAL MEDICINE

## 2019-10-20 PROCEDURE — 80048 BASIC METABOLIC PNL TOTAL CA: CPT

## 2019-10-20 PROCEDURE — 6360000002 HC RX W HCPCS: Performed by: FAMILY MEDICINE

## 2019-10-20 PROCEDURE — 83735 ASSAY OF MAGNESIUM: CPT

## 2019-10-20 RX ORDER — PREDNISONE 20 MG/1
40 TABLET ORAL ONCE
Status: COMPLETED | OUTPATIENT
Start: 2019-10-20 | End: 2019-10-20

## 2019-10-20 RX ORDER — LANOLIN ALCOHOL/MO/W.PET/CERES
400 CREAM (GRAM) TOPICAL 2 TIMES DAILY
Qty: 60 TABLET | Refills: 0 | Status: ON HOLD | OUTPATIENT
Start: 2019-10-20 | End: 2021-05-17

## 2019-10-20 RX ORDER — AMOXICILLIN AND CLAVULANATE POTASSIUM 875; 125 MG/1; MG/1
1 TABLET, FILM COATED ORAL 2 TIMES DAILY
Qty: 24 TABLET | Refills: 0 | Status: SHIPPED | OUTPATIENT
Start: 2019-10-20 | End: 2019-11-01

## 2019-10-20 RX ORDER — PREDNISONE 10 MG/1
TABLET ORAL
Qty: 24 TABLET | Refills: 0 | Status: SHIPPED | OUTPATIENT
Start: 2019-10-20 | End: 2020-11-19

## 2019-10-20 RX ORDER — GREEN TEA/HOODIA GORDONII 315-12.5MG
1 CAPSULE ORAL DAILY
Qty: 15 TABLET | Refills: 0 | Status: SHIPPED | OUTPATIENT
Start: 2019-10-20 | End: 2019-11-04

## 2019-10-20 RX ORDER — ACETAMINOPHEN 500 MG
500 TABLET ORAL 4 TIMES DAILY PRN
Qty: 360 TABLET | Refills: 1 | Status: SHIPPED | OUTPATIENT
Start: 2019-10-20

## 2019-10-20 RX ORDER — MAGNESIUM SULFATE IN WATER 40 MG/ML
2 INJECTION, SOLUTION INTRAVENOUS ONCE
Status: COMPLETED | OUTPATIENT
Start: 2019-10-20 | End: 2019-10-20

## 2019-10-20 RX ADMIN — PIPERACILLIN AND TAZOBACTAM 3.38 G: 3; .375 INJECTION, POWDER, LYOPHILIZED, FOR SOLUTION INTRAVENOUS at 02:45

## 2019-10-20 RX ADMIN — INSULIN LISPRO 4 UNITS: 100 INJECTION, SOLUTION INTRAVENOUS; SUBCUTANEOUS at 13:17

## 2019-10-20 RX ADMIN — METHYLPREDNISOLONE SODIUM SUCCINATE 40 MG: 40 INJECTION, POWDER, FOR SOLUTION INTRAMUSCULAR; INTRAVENOUS at 00:22

## 2019-10-20 RX ADMIN — IPRATROPIUM BROMIDE 0.5 MG: 0.5 SOLUTION RESPIRATORY (INHALATION) at 12:35

## 2019-10-20 RX ADMIN — GUAIFENESIN AND DEXTROMETHORPHAN HYDROBROMIDE 1 TABLET: 600; 30 TABLET, EXTENDED RELEASE ORAL at 08:07

## 2019-10-20 RX ADMIN — POTASSIUM CHLORIDE 20 MEQ: 20 TABLET, EXTENDED RELEASE ORAL at 08:09

## 2019-10-20 RX ADMIN — MAGNESIUM SULFATE HEPTAHYDRATE 2 G: 40 INJECTION, SOLUTION INTRAVENOUS at 10:50

## 2019-10-20 RX ADMIN — INSULIN LISPRO 2 UNITS: 100 INJECTION, SOLUTION INTRAVENOUS; SUBCUTANEOUS at 08:52

## 2019-10-20 RX ADMIN — IPRATROPIUM BROMIDE 0.5 MG: 0.5 SOLUTION RESPIRATORY (INHALATION) at 08:36

## 2019-10-20 RX ADMIN — PREDNISONE 40 MG: 20 TABLET ORAL at 14:30

## 2019-10-20 RX ADMIN — PIPERACILLIN AND TAZOBACTAM 3.38 G: 3; .375 INJECTION, POWDER, LYOPHILIZED, FOR SOLUTION INTRAVENOUS at 09:50

## 2019-10-20 RX ADMIN — METHYLPREDNISOLONE SODIUM SUCCINATE 40 MG: 40 INJECTION, POWDER, FOR SOLUTION INTRAMUSCULAR; INTRAVENOUS at 08:06

## 2019-10-20 ASSESSMENT — PAIN SCALES - GENERAL: PAINLEVEL_OUTOF10: 0

## 2019-10-20 ASSESSMENT — PULMONARY FUNCTION TESTS: PEFR_L/MIN: 110

## 2019-10-22 LAB
BLOOD CULTURE, ROUTINE: NORMAL
BLOOD CULTURE, ROUTINE: NORMAL

## 2020-09-13 ENCOUNTER — APPOINTMENT (OUTPATIENT)
Dept: GENERAL RADIOLOGY | Age: 40
End: 2020-09-13

## 2020-09-13 ENCOUNTER — HOSPITAL ENCOUNTER (EMERGENCY)
Age: 40
Discharge: HOME | End: 2020-09-13

## 2020-09-13 VITALS
HEIGHT: 64 IN | RESPIRATION RATE: 12 BRPM | SYSTOLIC BLOOD PRESSURE: 142 MMHG | DIASTOLIC BLOOD PRESSURE: 88 MMHG | OXYGEN SATURATION: 99 % | BODY MASS INDEX: 28.68 KG/M2 | HEART RATE: 93 BPM | WEIGHT: 168 LBS

## 2020-09-13 PROCEDURE — 73610 X-RAY EXAM OF ANKLE: CPT

## 2020-09-13 PROCEDURE — 99281 EMR DPT VST MAYX REQ PHY/QHP: CPT

## 2020-09-13 PROCEDURE — 96372 THER/PROPH/DIAG INJ SC/IM: CPT

## 2020-09-13 PROCEDURE — 73630 X-RAY EXAM OF FOOT: CPT

## 2020-09-13 PROCEDURE — 6360000002 HC RX W HCPCS: Performed by: PHYSICIAN ASSISTANT

## 2020-09-13 RX ORDER — CLINDAMYCIN HYDROCHLORIDE 150 MG/1
300 CAPSULE ORAL 4 TIMES DAILY
Qty: 80 CAPSULE | Refills: 0 | Status: SHIPPED | OUTPATIENT
Start: 2020-09-13 | End: 2020-09-23

## 2020-09-13 RX ORDER — KETOROLAC TROMETHAMINE 30 MG/ML
30 INJECTION, SOLUTION INTRAMUSCULAR; INTRAVENOUS ONCE
Status: COMPLETED | OUTPATIENT
Start: 2020-09-13 | End: 2020-09-13

## 2020-09-13 RX ORDER — INDOMETHACIN 50 MG/1
50 CAPSULE ORAL
Qty: 20 CAPSULE | Refills: 0 | Status: SHIPPED | OUTPATIENT
Start: 2020-09-13 | End: 2020-11-19 | Stop reason: SDUPTHER

## 2020-09-13 RX ADMIN — KETOROLAC TROMETHAMINE 30 MG: 30 INJECTION, SOLUTION INTRAMUSCULAR at 16:10

## 2020-09-13 ASSESSMENT — ENCOUNTER SYMPTOMS
CONSTIPATION: 0
COLOR CHANGE: 1
VOMITING: 0
COUGH: 0
NAUSEA: 0
PHOTOPHOBIA: 0
SORE THROAT: 0
DIARRHEA: 0
TROUBLE SWALLOWING: 0
SHORTNESS OF BREATH: 0
BACK PAIN: 0
ABDOMINAL PAIN: 0

## 2020-09-13 ASSESSMENT — PAIN SCALES - GENERAL
PAINLEVEL_OUTOF10: 6
PAINLEVEL_OUTOF10: 3

## 2020-09-13 ASSESSMENT — PAIN DESCRIPTION - FREQUENCY: FREQUENCY: CONTINUOUS

## 2020-09-13 ASSESSMENT — PAIN DESCRIPTION - PAIN TYPE: TYPE: ACUTE PAIN

## 2020-09-13 ASSESSMENT — PAIN DESCRIPTION - DESCRIPTORS: DESCRIPTORS: ACHING

## 2020-09-13 ASSESSMENT — PAIN DESCRIPTION - LOCATION: LOCATION: FOOT

## 2020-09-13 ASSESSMENT — PAIN DESCRIPTION - ORIENTATION: ORIENTATION: RIGHT

## 2020-09-13 NOTE — ED TRIAGE NOTES
Pt comes to the ED with right foot pain extending up along the lateral ankle. Pt denies injury. No redness or swelling noted but pt has ACE wrap on. .  3+ pulses.

## 2020-09-13 NOTE — ED PROVIDER NOTES
[facility] EMERGENCY DEPT      CHIEF COMPLAINT       Chief Complaint   Patient presents with    Foot Pain       Nurses Notes reviewed and I agree except as noted in the HPI. HISTORY OF PRESENT ILLNESS    April D Ana Huntley is a 45 y.o. female who presents for right foot pain. Patient states that the pain started 5 days ago. She denies any history of traumatic injury. Pain is concentrated on the dorsum of the foot and anterior ankle, with the greatest pain being at the ankle. She states that there is also swelling when she has been on her feet all day that is concentrated over the lateral aspect of the ankle. She has tried ice, elevation, wrapping her foot and ankle, and ibuprofen with minimal relief. REVIEW OF SYSTEMS     Review of Systems   Constitutional: Negative for chills and fever. HENT: Negative for sore throat and trouble swallowing. Eyes: Negative for photophobia and visual disturbance. Respiratory: Negative for cough and shortness of breath. Cardiovascular: Negative for chest pain. Gastrointestinal: Negative for abdominal pain, constipation, diarrhea, nausea and vomiting. Genitourinary: Negative for difficulty urinating. Musculoskeletal: Positive for arthralgias, joint swelling and myalgias. Negative for back pain and neck pain. Skin: Positive for color change. Negative for rash and wound. Neurological: Negative for weakness and numbness. Psychiatric/Behavioral: Negative for confusion. PAST MEDICAL HISTORY    has a past medical history of Asthma and MDRO (multiple drug resistant organisms) resistance. SURGICAL HISTORY      has a past surgical history that includes Dilation and curettage of uterus; Appendectomy; Tubal ligation; Endometrial ablation (08/13/14); and Hysterectomy.     CURRENT MEDICATIONS       Discharge Medication List as of 9/13/2020  4:50 PM      CONTINUE these medications which have NOT CHANGED    Details   dextromethorphan-guaiFENesin (Amado Johnson8 DM)  MG per extended release tablet Take 1 tablet by mouth every 12 hours as needed for Cough Take only until cough is resolved, Disp-60 tablet, R-0Normal      predniSONE (DELTASONE) 10 MG tablet Take 40 mg daily from 10/21 to 10/23, then 20 mg daily from 10/24 to 10/27, then 10 mg daily from 10/28 to 10/31. Take with breakfast, Disp-24 tablet, R-0Normal      fluticasone-salmeterol (ADVAIR) 100-50 MCG/DOSE diskus inhaler Inhale 1 puff into the lungs every 12 hours, Disp-60 each, R-3Normal      acetaminophen (TYLENOL) 500 MG tablet Take 1 tablet by mouth 4 times daily as needed for Pain, Disp-360 tablet, R-1Normal      magnesium oxide (MAG-OX) 400 (240 Mg) MG tablet Take 1 tablet by mouth 2 times daily, Disp-60 tablet, R-0Normal      ibuprofen (ADVIL;MOTRIN) 200 MG tablet Take 400 mg by mouth every 6 hours as needed for PainHistorical Med             ALLERGIES     is allergic to aspirin. FAMILY HISTORY     She indicated that the status of her mother is unknown. She indicated that the status of her father is unknown. She indicated that the status of her maternal grandmother is unknown. She indicated that the status of her maternal grandfather is unknown.   family history includes Asthma in her father, maternal grandmother, and mother; Cancer in her maternal grandmother; Diabetes in her maternal grandfather; Heart Disease in her maternal grandmother; High Blood Pressure in her maternal grandmother; Kidney Disease in her maternal grandfather; Lung Cancer (age of onset: 79) in her maternal grandmother. SOCIAL HISTORY    reports that she has been smoking cigarettes. She has a 10.00 pack-year smoking history. She has never used smokeless tobacco. She reports that she does not drink alcohol or use drugs. PHYSICAL EXAM     INITIAL VITALS:  height is 5' 4\" (1.626 m) and weight is 168 lb (76.2 kg). Her blood pressure is 142/88 (abnormal) and her pulse is 93. Her respiration is 12 and oxygen saturation is 99%. who either signs or Co-signs this chart in the absence of a cardiologist.  none    RADIOLOGY: non-plain film images(s) such as CT,Ultrasound and MRI are read by the radiologist.  Plain radiographic images are visualized and preliminarily interpreted by the emergency physician unless otherwise stated below. XR ANKLE RIGHT (MIN 3 VIEWS)   Final Result   No acute fracture or dislocation involving the right ankle. **This report has been created using voice recognition software. It may contain minor errors which are inherent in voice recognition technology. **      Final report electronically signed by Dr Jesus Perry on 9/13/2020 4:46 PM      XR FOOT RIGHT (MIN 3 VIEWS)   Final Result   No acute fracture or dislocation involving the right foot. **This report has been created using voice recognition software. It may contain minor errors which are inherent in voice recognition technology. **      Final report electronically signed by Dr Jesus Perry on 9/13/2020 4:47 PM          LABS:   Labs Reviewed - No data to display    EMERGENCY DEPARTMENT COURSE:   Vitals:    Vitals:    09/13/20 1454   BP: (!) 142/88   Pulse: 93   Resp: 12   SpO2: 99%   Weight: 168 lb (76.2 kg)   Height: 5' 4\" (1.626 m)     MDM:  The patient was seen and evaluated by me in the intake area. Vital signs were reviewed. Physical exam revealed erythema and tenderness to the dorsal midfoot. She was neurovascularly intact. X-rays were ordered prior to me seeing the patient. Results were reviewed by me upon completion. Results showed NAP. Results were discussed with the patient and discharge plan was discussed. Post-op shoe applied. I have given the patient strict written and verbal instructions about care at home, follow-up, and signs and symptoms of worsening of condition and they did verbalize understanding. CRITICAL CARE:   None    CONSULTS:  None    PROCEDURES:  None    FINAL IMPRESSION      1.  Acute right ankle pain 2. Cellulitis of right foot          DISPOSITION/PLAN     1. Acute right ankle pain    2.  Cellulitis of right foot        PATIENT REFERRED TO:  GRACE Grajeda  [address] [address]  [phone]    Schedule an appointment as soon as possible for a visit         DISCHARGE MEDICATIONS:  Discharge Medication List as of 9/13/2020  4:50 PM      START taking these medications    Details   clindamycin (CLEOCIN) 150 MG capsule Take 2 capsules by mouth 4 times daily for 10 days, Disp-80 capsule,R-0Print      indomethacin (INDOCIN) 50 MG capsule Take 1 capsule by mouth 3 times daily (with meals), Disp-20 capsule,R-0Print             (Please note that portions of this note were completed with a voice recognition program.  Efforts were made to edit the dictations but occasionally words are mis-transcribed.)    Glendy Tineo PA-C 09/17/20 3:32 PM    GIDEON Madera PA-C  09/17/20 [id number]

## 2020-11-18 ENCOUNTER — HOSPITAL ENCOUNTER (EMERGENCY)
Age: 40
Discharge: HOME | End: 2020-11-19
Attending: EMERGENCY MEDICINE
Payer: MEDICAID

## 2020-11-18 ENCOUNTER — APPOINTMENT (OUTPATIENT)
Dept: GENERAL RADIOLOGY | Age: 40
End: 2020-11-18
Payer: MEDICAID

## 2020-11-18 PROCEDURE — 36415 COLL VENOUS BLD VENIPUNCTURE: CPT

## 2020-11-18 PROCEDURE — 99284 EMERGENCY DEPT VISIT MOD MDM: CPT

## 2020-11-18 PROCEDURE — 73600 X-RAY EXAM OF ANKLE: CPT

## 2020-11-18 PROCEDURE — 84550 ASSAY OF BLOOD/URIC ACID: CPT

## 2020-11-18 PROCEDURE — 85025 COMPLETE CBC W/AUTO DIFF WBC: CPT

## 2020-11-18 PROCEDURE — 73620 X-RAY EXAM OF FOOT: CPT

## 2020-11-18 PROCEDURE — 83735 ASSAY OF MAGNESIUM: CPT

## 2020-11-18 PROCEDURE — 80053 COMPREHEN METABOLIC PANEL: CPT

## 2020-11-18 RX ORDER — IBUPROFEN 200 MG
400 TABLET ORAL ONCE
Status: COMPLETED | OUTPATIENT
Start: 2020-11-19 | End: 2020-11-19

## 2020-11-18 RX ORDER — HYDROCODONE BITARTRATE AND ACETAMINOPHEN 5; 325 MG/1; MG/1
1 TABLET ORAL ONCE
Status: COMPLETED | OUTPATIENT
Start: 2020-11-19 | End: 2020-11-19

## 2020-11-18 RX ORDER — PREDNISONE 20 MG/1
40 TABLET ORAL ONCE
Status: COMPLETED | OUTPATIENT
Start: 2020-11-19 | End: 2020-11-19

## 2020-11-18 ASSESSMENT — ENCOUNTER SYMPTOMS
NAUSEA: 0
TROUBLE SWALLOWING: 0
BLOOD IN STOOL: 0
SORE THROAT: 0
CONSTIPATION: 0
SINUS PRESSURE: 0
EYE ITCHING: 0
CHEST TIGHTNESS: 0
EYE DISCHARGE: 0
DIARRHEA: 0
ABDOMINAL DISTENTION: 0
WHEEZING: 0
EYE REDNESS: 0
EYE PAIN: 0
CHOKING: 0
VOMITING: 0
RHINORRHEA: 0
BACK PAIN: 0
ABDOMINAL PAIN: 0
VOICE CHANGE: 0
PHOTOPHOBIA: 0
COUGH: 0
SHORTNESS OF BREATH: 0

## 2020-11-18 ASSESSMENT — PAIN DESCRIPTION - DESCRIPTORS: DESCRIPTORS: ACHING

## 2020-11-18 ASSESSMENT — PAIN DESCRIPTION - ORIENTATION: ORIENTATION: RIGHT

## 2020-11-18 ASSESSMENT — PAIN SCALES - GENERAL: PAINLEVEL_OUTOF10: 8

## 2020-11-18 ASSESSMENT — PAIN DESCRIPTION - PAIN TYPE: TYPE: ACUTE PAIN

## 2020-11-18 ASSESSMENT — PAIN DESCRIPTION - LOCATION: LOCATION: FOOT

## 2020-11-18 NOTE — LETTER
[facility] [address] [address] EMERGENCY DEPT  [address] [place] [address]  Phone: [phone]               November 19, 2020    Patient: April KRYSTIAN Borden   YOB: 1980   Date of Visit: 11/18/2020       To Whom It May Concern:    Sathish Saavedra was seen and treated in our emergency department on 11/18/2020. She may return to work on 11/20/20.       Sincerely,       JESÚS      Signature:__________________________________

## 2020-11-19 VITALS
TEMPERATURE: 98.5 F | HEART RATE: 95 BPM | OXYGEN SATURATION: 98 % | RESPIRATION RATE: 20 BRPM | WEIGHT: 169 LBS | BODY MASS INDEX: 28.85 KG/M2 | DIASTOLIC BLOOD PRESSURE: 110 MMHG | SYSTOLIC BLOOD PRESSURE: 139 MMHG | HEIGHT: 64 IN

## 2020-11-19 LAB
ALBUMIN SERPL-MCNC: 4.2 G/DL (ref 3.5–5.1)
ALP BLD-CCNC: 90 U/L (ref 38–126)
ALT SERPL-CCNC: ABNORMAL U/L (ref 11–66)
ANION GAP SERPL CALCULATED.3IONS-SCNC: 12 MEQ/L (ref 8–16)
AST SERPL-CCNC: ABNORMAL U/L (ref 5–40)
ATYPICAL LYMPHOCYTES: ABNORMAL %
BASOPHILS # BLD: 0.8 %
BASOPHILS ABSOLUTE: 0.1 THOU/MM3 (ref 0–0.1)
BILIRUB SERPL-MCNC: < 0.2 MG/DL (ref 0.3–1.2)
BUN BLDV-MCNC: 13 MG/DL (ref 7–22)
CALCIUM SERPL-MCNC: 9.3 MG/DL (ref 8.5–10.5)
CHLORIDE BLD-SCNC: 98 MEQ/L (ref 98–111)
CO2: 26 MEQ/L (ref 23–33)
CREAT SERPL-MCNC: 0.7 MG/DL (ref 0.4–1.2)
EOSINOPHIL # BLD: 3.8 %
EOSINOPHILS ABSOLUTE: 0.7 THOU/MM3 (ref 0–0.4)
ERYTHROCYTE [DISTWIDTH] IN BLOOD BY AUTOMATED COUNT: 12.9 % (ref 11.5–14.5)
ERYTHROCYTE [DISTWIDTH] IN BLOOD BY AUTOMATED COUNT: 42.9 FL (ref 35–45)
GFR SERPL CREATININE-BSD FRML MDRD: > 90 ML/MIN/1.73M2
GLUCOSE BLD-MCNC: 121 MG/DL (ref 70–108)
HCT VFR BLD CALC: 41.3 % (ref 37–47)
HEMOGLOBIN: 14.8 GM/DL (ref 12–16)
IMMATURE GRANS (ABS): 0.07 THOU/MM3 (ref 0–0.07)
IMMATURE GRANULOCYTES: 0.4 %
LYMPHOCYTES # BLD: 33.4 %
LYMPHOCYTES ABSOLUTE: 5.9 THOU/MM3 (ref 1–4.8)
MAGNESIUM: 1.2 MG/DL (ref 1.6–2.4)
MCH RBC QN AUTO: 33 PG (ref 26–33)
MCHC RBC AUTO-ENTMCNC: 35.8 GM/DL (ref 32.2–35.5)
MCV RBC AUTO: 92 FL (ref 81–99)
MONOCYTES # BLD: 5.5 %
MONOCYTES ABSOLUTE: 1 THOU/MM3 (ref 0.4–1.3)
NUCLEATED RED BLOOD CELLS: 0 /100 WBC
ORIGINAL SAMPLE NUMBER: NORMAL
ORIGINAL SAMPLE NUMBER: NORMAL
OSMOLALITY CALCULATION: 273.3 MOSMOL/KG (ref 275–300)
PLATELET # BLD: 303 THOU/MM3 (ref 130–400)
PMV BLD AUTO: 10.5 FL (ref 9.4–12.4)
POTASSIUM SERPL-SCNC: 3.9 MEQ/L (ref 3.5–5.2)
RBC # BLD: 4.49 MILL/MM3 (ref 4.2–5.4)
REFERENCE LOCATION: NORMAL
REFERENCE LOCATION: NORMAL
REFERENCE RANGE: NORMAL
REFERENCE RANGE: NORMAL
SCAN OF BLOOD SMEAR: NORMAL
SEG NEUTROPHILS: 56.1 %
SEGMENTED NEUTROPHILS ABSOLUTE COUNT: 9.9 THOU/MM3 (ref 1.8–7.7)
SODIUM BLD-SCNC: 136 MEQ/L (ref 135–145)
TEST RESULTS WITH UNITS: NORMAL
TEST RESULTS WITH UNITS: NORMAL
TEST(S) BEING PERFORMED: NORMAL
TEST(S) BEING PERFORMED: NORMAL
TOTAL PROTEIN: 7.5 G/DL (ref 6.1–8)
URIC ACID: 6.3 MG/DL (ref 2.4–5.7)
WBC # BLD: 17.7 THOU/MM3 (ref 4.8–10.8)

## 2020-11-19 PROCEDURE — 6370000000 HC RX 637 (ALT 250 FOR IP): Performed by: EMERGENCY MEDICINE

## 2020-11-19 RX ORDER — TRAMADOL HYDROCHLORIDE 50 MG/1
50 TABLET ORAL EVERY 8 HOURS PRN
Qty: 9 TABLET | Refills: 0 | Status: SHIPPED | OUTPATIENT
Start: 2020-11-19 | End: 2020-11-22

## 2020-11-19 RX ORDER — PREDNISONE 20 MG/1
20 TABLET ORAL DAILY
Qty: 10 TABLET | Refills: 0 | Status: SHIPPED | OUTPATIENT
Start: 2020-11-19 | End: 2020-11-29

## 2020-11-19 RX ORDER — INDOMETHACIN 50 MG/1
50 CAPSULE ORAL
Qty: 30 CAPSULE | Refills: 0 | Status: ON HOLD | OUTPATIENT
Start: 2020-11-19 | End: 2021-05-17

## 2020-11-19 RX ADMIN — PREDNISONE 40 MG: 20 TABLET ORAL at 00:42

## 2020-11-19 RX ADMIN — IBUPROFEN 400 MG: 200 TABLET, FILM COATED ORAL at 00:42

## 2020-11-19 RX ADMIN — HYDROCODONE BITARTRATE AND ACETAMINOPHEN 1 TABLET: 5; 325 TABLET ORAL at 00:42

## 2020-11-19 ASSESSMENT — PAIN SCALES - GENERAL: PAINLEVEL_OUTOF10: 8

## 2020-11-19 NOTE — ED PROVIDER NOTES
[facility]  eMERGENCY dEPARTMENT eNCOUnter          CHIEF COMPLAINT       Chief Complaint   Patient presents with    Foot Pain       Nurses Notes reviewed and I agree except as noted in the HPI. HISTORY OF PRESENT ILLNESS    April D Katerina Zhou is a 45 y.o. female who presents right foot and ankle pain. Patient denies any trauma. Patient is on Indocin for gout. She states she has swelling. She has pain at the dorsum of the foot and lateral aspect of the ankle. Patient is able to move the foot without any difficulty. She is able to weight-bear without any difficulty. Currently the patient is resting comfortably on the cot no apparent distress. REVIEW OF SYSTEMS     Review of Systems   Constitutional: Negative for activity change, appetite change, diaphoresis, fatigue and unexpected weight change. HENT: Negative for congestion, ear discharge, ear pain, hearing loss, rhinorrhea, sinus pressure, sore throat, trouble swallowing and voice change. Eyes: Negative for photophobia, pain, discharge, redness and itching. Respiratory: Negative for cough, choking, chest tightness, shortness of breath and wheezing. Cardiovascular: Negative for chest pain, palpitations and leg swelling. Gastrointestinal: Negative for abdominal distention, abdominal pain, blood in stool, constipation, diarrhea, nausea and vomiting. Endocrine: Negative for polydipsia, polyphagia and polyuria. Genitourinary: Negative for decreased urine volume, difficulty urinating, dysuria, enuresis, frequency, hematuria and urgency. Musculoskeletal: Positive for arthralgias, gait problem and myalgias. Negative for back pain, neck pain and neck stiffness. Skin: Negative for pallor and rash. Allergic/Immunologic: Negative for immunocompromised state. Neurological: Negative for dizziness, tremors, seizures, syncope, facial asymmetry, weakness, light-headedness, numbness and headaches.    Hematological: Negative for adenopathy. Does not bruise/bleed easily. Psychiatric/Behavioral: Negative for agitation, hallucinations and suicidal ideas. The patient is not nervous/anxious. PAST MEDICAL HISTORY    has a past medical history of Asthma and MDRO (multiple drug resistant organisms) resistance. SURGICAL HISTORY      has a past surgical history that includes Dilation and curettage of uterus; Appendectomy; Tubal ligation; Endometrial ablation (08/13/14); and Hysterectomy. CURRENT MEDICATIONS       Previous Medications    ACETAMINOPHEN (TYLENOL) 500 MG TABLET    Take 1 tablet by mouth 4 times daily as needed for Pain    DEXTROMETHORPHAN-GUAIFENESIN (MUCINEX DM)  MG PER EXTENDED RELEASE TABLET    Take 1 tablet by mouth every 12 hours as needed for Cough Take only until cough is resolved    FLUTICASONE-SALMETEROL (ADVAIR) 100-50 MCG/DOSE DISKUS INHALER    Inhale 1 puff into the lungs every 12 hours    IBUPROFEN (ADVIL;MOTRIN) 200 MG TABLET    Take 400 mg by mouth every 6 hours as needed for Pain    MAGNESIUM OXIDE (MAG-OX) 400 (240 MG) MG TABLET    Take 1 tablet by mouth 2 times daily       ALLERGIES     is allergic to aspirin. FAMILY HISTORY     She indicated that the status of her mother is unknown. She indicated that the status of her father is unknown. She indicated that the status of her maternal grandmother is unknown. She indicated that the status of her maternal grandfather is unknown.   family history includes Asthma in her father, maternal grandmother, and mother; Cancer in her maternal grandmother; Diabetes in her maternal grandfather; Heart Disease in her maternal grandmother; High Blood Pressure in her maternal grandmother; Kidney Disease in her maternal grandfather; Lung Cancer (age of onset: 79) in her maternal grandmother. SOCIAL HISTORY      reports that she has been smoking cigarettes. She has a 10.00 pack-year smoking history.  She has never used smokeless tobacco. She reports that she does foot: Bunion present. Feet:    Feet:      Right foot:      Skin integrity: Erythema present. No ulcer, blister, skin breakdown, dry skin or fissure. Toenail Condition: Right toenails are normal.      Left foot:      Skin integrity: Erythema present. No ulcer, blister, skin breakdown, dry skin or fissure. Toenail Condition: Left toenails are normal.      Comments: Mild erythema tenderness mild swelling. Palpable pulses, capillary refill less than 3 seconds. Lymphadenopathy:      Cervical: No cervical adenopathy. Skin:     General: Skin is warm and dry. Findings: No bruising, ecchymosis, lesion or rash. Neurological:      Mental Status: She is alert and oriented to person, place, and time. Cranial Nerves: No cranial nerve deficit. Coordination: Coordination normal.      Deep Tendon Reflexes: Reflexes are normal and symmetric. Psychiatric:         Speech: Speech normal.         Behavior: Behavior normal.         Thought Content: Thought content normal.         Judgment: Judgment normal.           DIFFERENTIAL DIAGNOSIS:   Differential diagnosis discussed extensively bedside with the patient including but not limited to. Gout, tendinitis arthritis less likely fracture. DIAGNOSTIC RESULTS     EKG: All EKG's are interpreted by the Emergency Department Physician who either signs or Co-signs this chart in the absence of a cardiologist.  None    RADIOLOGY: non-plain film images(s) such as CT, Ultrasound and MRI are read by the radiologist.  XR FOOT RIGHT (2 VIEWS)   Final Result   Impression:   1. No acute fracture seen. This document has been electronically signed by: Lord Janie MD on    11/19/2020 12:19 AM         XR ANKLE RIGHT (2 VIEWS)   Final Result   Impression:   1. Soft tissue swelling without fracture.       This document has been electronically signed by: Lord Janie MD on    11/19/2020 12:19 AM               LABS:   Labs Reviewed   CBC WITH AUTO DIFFERENTIAL - Abnormal; Notable for the following components:       Result Value    WBC 17.7 (*)     MCHC 35.8 (*)     Segs Absolute 9.9 (*)     Lymphocytes Absolute 5.9 (*)     Eosinophils Absolute 0.7 (*)     All other components within normal limits   MAGNESIUM - Abnormal; Notable for the following components:    Magnesium 1.2 (*)     All other components within normal limits   COMPREHENSIVE METABOLIC PANEL - Abnormal; Notable for the following components:    Glucose 121 (*)     Total Bilirubin <0.2 (*)     All other components within normal limits   URIC ACID - Abnormal; Notable for the following components:    Uric Acid 6.3 (*)     All other components within normal limits   OSMOLALITY - Abnormal; Notable for the following components:    Osmolality Calc 273.3 (*)     All other components within normal limits   SCAN OF BLOOD SMEAR   ANION GAP   GLOMERULAR FILTRATION RATE, ESTIMATED   REFERENCE LAB SAMPLE    Narrative:     please draw all chemistry tests in an sst tube   REFERENCE LAB SAMPLE    Narrative:     please draw all chemistry tests in an sst tube       EMERGENCY DEPARTMENT COURSE:   Vitals:    Vitals:    11/18/20 2326 11/19/20 0044   BP: (!) 133/101 (!) 139/110   Pulse: 115 95   Resp: 20 20   Temp: 98.5 °F (36.9 °C)    TempSrc: Oral    SpO2: 98% 98%   Weight: 169 lb (76.7 kg)    Height: 5' 4\" (1.626 m)      Patient was assessed bedside appropriate labs and imaging were ordered. Patient is already on indomethacin. This is most likely a gout attack. Patient had palpable pulses bilaterally. Capillary refill was less than 3 seconds, and review of her records she does have a history of gout. She was on indomethacin in the past for this. I went back and reassessed the patient she is doing quite well. I did talk to her about her case. She vaguely remembers being diagnosed for this. Her uric acid here is elevated today. Patient will be given medications for at home.   She is instructed to follow-up with her primary care physician. Patient understood and agreed with the plan. Patient is subsequently discharged home in good good condition. Patient has what appears to be a gouty flare. Patient is instructed to use the Indocin prednisone and Ultram as prescribed. She is instructed to follow-up with her primary care physician and to do so within the next 1 to 2 days. She is instructed to return to the nearest emergency room immediately for any new or worsening complaints. CRITICAL CARE:   None    CONSULTS:  None    PROCEDURES:  None    FINAL IMPRESSION      1. Acute idiopathic gout of right ankle          DISPOSITION/PLAN   Discharge    PATIENT REFERRED TO:  [address] [facility]  [address] [facility]  Call in 1 day        DISCHARGE MEDICATIONS:  New Prescriptions    PREDNISONE (DELTASONE) 20 MG TABLET    Take 1 tablet by mouth daily for 10 days    TRAMADOL (ULTRAM) 50 MG TABLET    Take 1 tablet by mouth every 8 hours as needed for Pain for up to 3 days. Intended supply: 3 days.  Take lowest dose possible to manage pain       (Please note that portions of this note were completed with a voice recognition program.  Efforts were made to edit the dictations but occasionally words are mis-transcribed.)    DO Sampson Salcedo DO  11/19/20 0126

## 2020-11-19 NOTE — ED NOTES
Per Dr. Wilma Pappas, verbalized order to this RN for work excuse and to have pt return to work on 11/20/20.      Rocky Arcos RN  11/19/20 0637

## 2020-11-19 NOTE — ED TRIAGE NOTES
Pt presents to the ED from home c/o right foot pain that has been ongoing for months now. Pt states that she was seen here a couple of months ago and was diagnosed with cellulitis and was sent home with antibiotics. Pt states this has not helped and she continues to have pain. Pt states she can barely walk at work anymore due to pain. Foot is painful to touch. Doppler of pedal pulses are strong. Pt is alert and oriented x4. Dr. Toan Hewitt to bedside.

## 2020-11-30 ENCOUNTER — APPOINTMENT (OUTPATIENT)
Dept: GENERAL RADIOLOGY | Age: 40
End: 2020-11-30
Payer: MEDICAID

## 2020-11-30 ENCOUNTER — HOSPITAL ENCOUNTER (EMERGENCY)
Age: 40
Discharge: HOME | End: 2020-11-30
Payer: MEDICAID

## 2020-11-30 VITALS
OXYGEN SATURATION: 97 % | HEART RATE: 100 BPM | WEIGHT: 175 LBS | DIASTOLIC BLOOD PRESSURE: 87 MMHG | SYSTOLIC BLOOD PRESSURE: 152 MMHG | TEMPERATURE: 98.4 F | RESPIRATION RATE: 20 BRPM | BODY MASS INDEX: 30.04 KG/M2

## 2020-11-30 PROCEDURE — 99282 EMERGENCY DEPT VISIT SF MDM: CPT

## 2020-11-30 PROCEDURE — U0003 INFECTIOUS AGENT DETECTION BY NUCLEIC ACID (DNA OR RNA); SEVERE ACUTE RESPIRATORY SYNDROME CORONAVIRUS 2 (SARS-COV-2) (CORONAVIRUS DISEASE [COVID-19]), AMPLIFIED PROBE TECHNIQUE, MAKING USE OF HIGH THROUGHPUT TECHNOLOGIES AS DESCRIBED BY CMS-2020-01-R: HCPCS

## 2020-11-30 PROCEDURE — 71046 X-RAY EXAM CHEST 2 VIEWS: CPT

## 2020-11-30 RX ORDER — AZITHROMYCIN 250 MG/1
TABLET, FILM COATED ORAL
Qty: 1 PACKET | Refills: 0 | Status: SHIPPED | OUTPATIENT
Start: 2020-11-30 | End: 2020-12-04

## 2020-11-30 ASSESSMENT — PAIN SCALES - GENERAL: PAINLEVEL_OUTOF10: 3

## 2020-11-30 NOTE — ED TRIAGE NOTES
Pt to the ED with c/o left upper back pain and cough. Pt states the back pain started 3 days ago and worsnes when she cough. States the cough started 6 days ago and has been dry.

## 2020-12-01 ENCOUNTER — CARE COORDINATION (OUTPATIENT)
Dept: CARE COORDINATION | Age: 40
End: 2020-12-01

## 2020-12-01 NOTE — CARE COORDINATION
Patient was called to follow up with most recent ER visit. There was no answer. A message was left on voicemail to have patient call back regarding ER visit. Office number given [phone].

## 2020-12-03 LAB — SARS-COV-2: NOT DETECTED

## 2020-12-04 ASSESSMENT — ENCOUNTER SYMPTOMS
CONSTIPATION: 0
SORE THROAT: 0
SINUS PRESSURE: 0
TROUBLE SWALLOWING: 0
WHEEZING: 1
NAUSEA: 0
BACK PAIN: 1
PHOTOPHOBIA: 0
COUGH: 1
VOMITING: 0
RHINORRHEA: 0
COLOR CHANGE: 0
SHORTNESS OF BREATH: 1
DIARRHEA: 0
SINUS PAIN: 0
CHEST TIGHTNESS: 0
ABDOMINAL PAIN: 0

## 2020-12-04 NOTE — ED PROVIDER NOTES
[facility] COMPLAINT       Chief Complaint   Patient presents with    Back Pain    Cough       Nurses Notes reviewed and I agree except as noted in the HPI. HISTORY OF PRESENT ILLNESS    April D Chavo Mack is a 45 y.o. female who presents to the Emergency Department for the evaluation of back pain for the past 3 days, dry cough for the past 6 days. States the cough has been dry nonproductive, has noticed left-sided back pain that she described as aching and shooting around her left shoulder blade. She denies fever, denies known sick contacts. The HPI was provided by the patient. REVIEW OF SYSTEMS     Review of Systems   Constitutional: Negative for chills, diaphoresis, fatigue and fever. HENT: Negative for congestion, ear pain, nosebleeds, rhinorrhea, sinus pressure, sinus pain, sore throat and trouble swallowing. Eyes: Negative for photophobia. Respiratory: Positive for cough, shortness of breath and wheezing. Negative for chest tightness. Cardiovascular: Negative for chest pain and palpitations. Gastrointestinal: Negative for abdominal pain, constipation, diarrhea, nausea and vomiting. Endocrine: Negative for cold intolerance and heat intolerance. Genitourinary: Negative for difficulty urinating, dysuria, flank pain, hematuria, pelvic pain, vaginal bleeding, vaginal discharge and vaginal pain. Musculoskeletal: Positive for back pain. Negative for arthralgias, joint swelling, myalgias and neck stiffness. Skin: Negative for color change, pallor, rash and wound. Neurological: Negative for dizziness, weakness, light-headedness, numbness and headaches. Psychiatric/Behavioral: Negative for agitation, behavioral problems, confusion, hallucinations, self-injury and suicidal ideas. The patient is not nervous/anxious.         PAST MEDICAL HISTORY    has a past medical history of Asthma and MDRO (multiple drug resistant smoking history. She has never used smokeless tobacco. She reports that she does not drink alcohol or use drugs. PHYSICAL EXAM     INITIAL VITALS:  weight is 175 lb (79.4 kg). Her oral temperature is 98.4 °F (36.9 °C). Her blood pressure is 152/87 (abnormal) and her pulse is 100. Her respiration is 20 and oxygen saturation is 97%. Physical Exam  Vitals signs and nursing note reviewed. Constitutional:       General: She is awake. She is not in acute distress. Appearance: Normal appearance. She is well-developed and normal weight. She is not ill-appearing, toxic-appearing or diaphoretic. HENT:      Head: Normocephalic and atraumatic. Right Ear: Tympanic membrane normal.      Left Ear: Tympanic membrane normal.      Nose: Nose normal.      Mouth/Throat:      Mouth: Mucous membranes are moist.      Pharynx: Oropharynx is clear. Eyes:      Extraocular Movements: Extraocular movements intact. Pupils: Pupils are equal, round, and reactive to light. Neck:      Musculoskeletal: Normal range of motion and neck supple. No neck rigidity or muscular tenderness. Vascular: No carotid bruit. Cardiovascular:      Rate and Rhythm: Normal rate and regular rhythm. Pulses: Normal pulses. Heart sounds: Normal heart sounds, S1 normal and S2 normal. Heart sounds not distant. No murmur. No friction rub. No gallop. Pulmonary:      Effort: Pulmonary effort is normal. No tachypnea, bradypnea, accessory muscle usage, prolonged expiration, respiratory distress or retractions. Breath sounds: Normal breath sounds. Abdominal:      General: Abdomen is flat. Bowel sounds are normal. There is no distension or abdominal bruit. There are no signs of injury. Palpations: Abdomen is soft. There is no shifting dullness, fluid wave, hepatomegaly, splenomegaly, mass or pulsatile mass. Tenderness: There is no abdominal tenderness. There is no guarding or rebound.       Hernia: No hernia is present. Musculoskeletal: Normal range of motion. General: No swelling, tenderness, deformity or signs of injury. Right lower leg: No edema. Left lower leg: No edema. Lymphadenopathy:      Cervical: No cervical adenopathy. Skin:     General: Skin is warm and dry. Capillary Refill: Capillary refill takes less than 2 seconds. Neurological:      General: No focal deficit present. Mental Status: She is alert and oriented to person, place, and time. Mental status is at baseline. GCS: GCS eye subscore is 4. GCS verbal subscore is 5. GCS motor subscore is 6. Cranial Nerves: Cranial nerves are intact. Psychiatric:         Attention and Perception: Attention normal.         Mood and Affect: Mood normal.         Speech: Speech normal.         Behavior: Behavior normal. Behavior is cooperative. Thought Content: Thought content normal.         Cognition and Memory: Cognition and memory normal.         Judgment: Judgment normal.          DIFFERENTIAL DIAGNOSIS:   URI, pneumonia, COVID-19 infection    DIAGNOSTIC RESULTS     EKG: All EKG's are interpreted by the Emergency Department Physician who either signs or Co-signs this chart in the absence of a cardiologist.    None    RADIOLOGY: non-plainfilm images(s) such as CT, Ultrasound and MRI are read by the radiologist.    XR CHEST (2 VW)   Final Result      No acute findings. **This report has been created using voice recognition software. It may contain minor errors which are inherent in voice recognition technology. **      Final report electronically signed by Dr. Michaele Castleman on 11/30/2020 3:22 PM          LABS:     Labs Reviewed   COVID-19 AMBULATORY   COVID-19   COVID-19       EMERGENCY DEPARTMENT COURSE:   Vitals:    Vitals:    11/30/20 1436   BP: (!) 152/87   Pulse: 100   Resp: 20   Temp: 98.4 °F (36.9 °C)   TempSrc: Oral   SpO2: 97%   Weight: 175 lb (79.4 kg)       11:40 AM EST: The patient was seen and evaluated. MDM:  Patient seen evaluated today for cough for the past 6 days, has developed some left shoulder blade pain with cough. Pain is worse with cough. Believe that this is likely musculoskeletal.  Due to duration of symptoms she will be placed on a short Z-Deric. Instructed to follow-up with PCP for reevaluation. Patient was afebrile, not noted to be tachycardic or hypoxic. No other questions or concerns, she was amenable this plan and departed the ED in stable condition    CRITICAL CARE:   None    CONSULTS:  None    PROCEDURES:  None    FINAL IMPRESSION      1. Cough    2. Bronchitis    3. Upper respiratory tract infection, unspecified type          DISPOSITION/PLAN   Discharge    PATIENT REFERRED TO:  DREA Parada CNP  [address] [facility]  [address] [facility]    Call   As needed      DISCHARGE MEDICATIONS:  Discharge Medication List as of 11/30/2020  3:55 PM      START taking these medications    Details   azithromycin (ZITHROMAX Z-DERIC) 250 MG tablet Take 2 tablets (500 mg) on Day 1, and then take 1 tablet (250 mg) on days 2 through 5., Disp-1 packet,R-0Print             (Please note that portions of this note were completed with a voice recognition program.  Efforts were made to edit the dictations but occasionally words are mis-transcribed.)    The patient was given an opportunity to see the Emergency Attending. The patient voiced understanding that I was a Mid-LevelProvider and was in agreement with being seen independently by myself.        DREA El CNP, 12/4/20, 11:43 AM       DREA El CNP  12/04/20 0230

## 2021-05-16 ENCOUNTER — APPOINTMENT (OUTPATIENT)
Dept: GENERAL RADIOLOGY | Age: 41
DRG: 137 | End: 2021-05-16
Payer: MEDICARE

## 2021-05-16 ENCOUNTER — HOSPITAL ENCOUNTER (INPATIENT)
Age: 41
LOS: 2 days | Discharge: HOME | DRG: 137 | End: 2021-05-18
Attending: EMERGENCY MEDICINE | Admitting: INTERNAL MEDICINE
Payer: MEDICARE

## 2021-05-16 DIAGNOSIS — A41.9 SEPSIS DUE TO PNEUMONIA (HCC): Primary | ICD-10-CM

## 2021-05-16 DIAGNOSIS — J18.9 SEPSIS DUE TO PNEUMONIA (HCC): Primary | ICD-10-CM

## 2021-05-16 DIAGNOSIS — J15.9 BACTERIAL PNEUMONIA: ICD-10-CM

## 2021-05-16 LAB
ANION GAP SERPL CALCULATED.3IONS-SCNC: 11 MEQ/L (ref 8–16)
BASOPHILS # BLD: 0.8 %
BASOPHILS ABSOLUTE: 0.1 THOU/MM3 (ref 0–0.1)
BUN BLDV-MCNC: 10 MG/DL (ref 7–22)
C-REACTIVE PROTEIN: < 0.3 MG/DL (ref 0–1)
CALCIUM SERPL-MCNC: 9.4 MG/DL (ref 8.5–10.5)
CHLORIDE BLD-SCNC: 100 MEQ/L (ref 98–111)
CO2: 26 MEQ/L (ref 23–33)
CREAT SERPL-MCNC: 0.9 MG/DL (ref 0.4–1.2)
EOSINOPHIL # BLD: 2.9 %
EOSINOPHILS ABSOLUTE: 0.5 THOU/MM3 (ref 0–0.4)
ERYTHROCYTE [DISTWIDTH] IN BLOOD BY AUTOMATED COUNT: 12.9 % (ref 11.5–14.5)
ERYTHROCYTE [DISTWIDTH] IN BLOOD BY AUTOMATED COUNT: 42.1 FL (ref 35–45)
GFR SERPL CREATININE-BSD FRML MDRD: 69 ML/MIN/1.73M2
GLUCOSE BLD-MCNC: 108 MG/DL (ref 70–108)
HCT VFR BLD CALC: 42.1 % (ref 37–47)
HEMOGLOBIN: 14.9 GM/DL (ref 12–16)
IMMATURE GRANS (ABS): 0.06 THOU/MM3 (ref 0–0.07)
IMMATURE GRANULOCYTES: 0.3 %
LACTIC ACID: 1.5 MMOL/L (ref 0.5–2)
LYMPHOCYTES # BLD: 30.3 %
LYMPHOCYTES ABSOLUTE: 5.2 THOU/MM3 (ref 1–4.8)
MCH RBC QN AUTO: 31.6 PG (ref 26–33)
MCHC RBC AUTO-ENTMCNC: 35.4 GM/DL (ref 32.2–35.5)
MCV RBC AUTO: 89.4 FL (ref 81–99)
MONOCYTES # BLD: 4.2 %
MONOCYTES ABSOLUTE: 0.7 THOU/MM3 (ref 0.4–1.3)
NUCLEATED RED BLOOD CELLS: 0 /100 WBC
OSMOLALITY CALCULATION: 273.4 MOSMOL/KG (ref 275–300)
PLATELET # BLD: 280 THOU/MM3 (ref 130–400)
PLATELET ESTIMATE: ADEQUATE
PMV BLD AUTO: 10.1 FL (ref 9.4–12.4)
POTASSIUM REFLEX MAGNESIUM: 4.1 MEQ/L (ref 3.5–5.2)
PRO-BNP: 73.1 PG/ML (ref 0–450)
PROCALCITONIN: < 0.02 NG/ML (ref 0.01–0.09)
RBC # BLD: 4.71 MILL/MM3 (ref 4.2–5.4)
SARS-COV-2, NAAT: NOT DETECTED
SCAN OF BLOOD SMEAR: NORMAL
SEG NEUTROPHILS: 61.5 %
SEGMENTED NEUTROPHILS ABSOLUTE COUNT: 10.6 THOU/MM3 (ref 1.8–7.7)
SODIUM BLD-SCNC: 137 MEQ/L (ref 135–145)
TROPONIN T: < 0.01 NG/ML
WBC # BLD: 17.2 THOU/MM3 (ref 4.8–10.8)

## 2021-05-16 PROCEDURE — 2580000003 HC RX 258: Performed by: STUDENT IN AN ORGANIZED HEALTH CARE EDUCATION/TRAINING PROGRAM

## 2021-05-16 PROCEDURE — 1200000000 HC SEMI PRIVATE

## 2021-05-16 PROCEDURE — 83880 ASSAY OF NATRIURETIC PEPTIDE: CPT

## 2021-05-16 PROCEDURE — 99285 EMERGENCY DEPT VISIT HI MDM: CPT

## 2021-05-16 PROCEDURE — 96365 THER/PROPH/DIAG IV INF INIT: CPT

## 2021-05-16 PROCEDURE — 87635 SARS-COV-2 COVID-19 AMP PRB: CPT

## 2021-05-16 PROCEDURE — 93005 ELECTROCARDIOGRAM TRACING: CPT | Performed by: EMERGENCY MEDICINE

## 2021-05-16 PROCEDURE — 87040 BLOOD CULTURE FOR BACTERIA: CPT

## 2021-05-16 PROCEDURE — 6360000002 HC RX W HCPCS: Performed by: STUDENT IN AN ORGANIZED HEALTH CARE EDUCATION/TRAINING PROGRAM

## 2021-05-16 PROCEDURE — 85025 COMPLETE CBC W/AUTO DIFF WBC: CPT

## 2021-05-16 PROCEDURE — 86140 C-REACTIVE PROTEIN: CPT

## 2021-05-16 PROCEDURE — 36415 COLL VENOUS BLD VENIPUNCTURE: CPT

## 2021-05-16 PROCEDURE — 84145 PROCALCITONIN (PCT): CPT

## 2021-05-16 PROCEDURE — 80048 BASIC METABOLIC PNL TOTAL CA: CPT

## 2021-05-16 PROCEDURE — 6370000000 HC RX 637 (ALT 250 FOR IP): Performed by: STUDENT IN AN ORGANIZED HEALTH CARE EDUCATION/TRAINING PROGRAM

## 2021-05-16 PROCEDURE — 71045 X-RAY EXAM CHEST 1 VIEW: CPT

## 2021-05-16 PROCEDURE — 83605 ASSAY OF LACTIC ACID: CPT

## 2021-05-16 PROCEDURE — 96361 HYDRATE IV INFUSION ADD-ON: CPT

## 2021-05-16 PROCEDURE — 84484 ASSAY OF TROPONIN QUANT: CPT

## 2021-05-16 RX ORDER — 0.9 % SODIUM CHLORIDE 0.9 %
30 INTRAVENOUS SOLUTION INTRAVENOUS ONCE
Status: DISCONTINUED | OUTPATIENT
Start: 2021-05-16 | End: 2021-05-16

## 2021-05-16 RX ORDER — 0.9 % SODIUM CHLORIDE 0.9 %
30 INTRAVENOUS SOLUTION INTRAVENOUS ONCE
Status: COMPLETED | OUTPATIENT
Start: 2021-05-16 | End: 2021-05-16

## 2021-05-16 RX ORDER — AZITHROMYCIN 250 MG/1
500 TABLET, FILM COATED ORAL ONCE
Status: COMPLETED | OUTPATIENT
Start: 2021-05-16 | End: 2021-05-16

## 2021-05-16 RX ADMIN — AZITHROMYCIN MONOHYDRATE 500 MG: 250 TABLET ORAL at 22:09

## 2021-05-16 RX ADMIN — CEFTRIAXONE SODIUM 1000 MG: 1 INJECTION, POWDER, FOR SOLUTION INTRAMUSCULAR; INTRAVENOUS at 22:09

## 2021-05-16 RX ADMIN — SODIUM CHLORIDE 1641 ML: 9 INJECTION, SOLUTION INTRAVENOUS at 20:05

## 2021-05-16 ASSESSMENT — ENCOUNTER SYMPTOMS
VOICE CHANGE: 0
SORE THROAT: 1
TROUBLE SWALLOWING: 0
GASTROINTESTINAL NEGATIVE: 1
ALLERGIC/IMMUNOLOGIC NEGATIVE: 1
STRIDOR: 0
COUGH: 1
CHOKING: 0
SINUS PRESSURE: 0
RHINORRHEA: 1
APNEA: 0
CHEST TIGHTNESS: 1
EYES NEGATIVE: 1
SHORTNESS OF BREATH: 1
WHEEZING: 0

## 2021-05-16 ASSESSMENT — PAIN DESCRIPTION - ORIENTATION
ORIENTATION: LEFT
ORIENTATION: LEFT;UPPER

## 2021-05-16 ASSESSMENT — PAIN DESCRIPTION - LOCATION
LOCATION: CHEST
LOCATION: CHEST

## 2021-05-16 ASSESSMENT — PAIN SCALES - GENERAL
PAINLEVEL_OUTOF10: 7
PAINLEVEL_OUTOF10: 7

## 2021-05-16 ASSESSMENT — PAIN DESCRIPTION - PAIN TYPE
TYPE: ACUTE PAIN
TYPE: ACUTE PAIN

## 2021-05-16 NOTE — ED PROVIDER NOTES
HISTORY     Family History   Problem Relation Age of Onset    Lung Cancer Maternal Grandmother 79    Cancer Maternal Grandmother     Heart Disease Maternal Grandmother     High Blood Pressure Maternal Grandmother     Asthma Maternal Grandmother     Asthma Mother     Asthma Father     Diabetes Maternal Grandfather     Kidney Disease Maternal Grandfather          PREVIOUS RECORDS   Previous records reviewed: previous ED notes and discharge summary      PHYSICAL EXAM     ED Triage Vitals [05/16/21 1913]   BP Temp Temp Source Pulse Resp SpO2 Height Weight   (!) 130/95 98.1 °F (36.7 °C) Oral 102 20 98 % 5' 4\" (1.626 m) 174 lb (78.9 kg)     Initial vital signs and nursing assessment reviewed and normal. Pulsoximetry is normal per my interpretation. Additional Vital Signs:  Vitals:    05/16/21 1913   BP: (!) 130/95   Pulse: 102   Resp: 20   Temp: 98.1 °F (36.7 °C)   SpO2: 98%       Physical Exam  Constitutional:       General: She is not in acute distress. Appearance: Normal appearance. She is normal weight. She is ill-appearing. HENT:      Head: Normocephalic and atraumatic. Nose: Nose normal.      Mouth/Throat:      Mouth: Mucous membranes are moist.      Pharynx: Oropharynx is clear. Cardiovascular:      Rate and Rhythm: Tachycardia present. Pulmonary:      Breath sounds: No stridor. Rhonchi (noted bilaterally in right middle and lower lobe) present. No wheezing or rales. Comments: Tachypnea noted  Chest:      Chest wall: Tenderness present. Abdominal:      General: Abdomen is flat. Bowel sounds are normal.      Tenderness: There is no abdominal tenderness. There is no guarding or rebound. Musculoskeletal:         General: No swelling or tenderness. Normal range of motion. Right lower leg: No edema. Left lower leg: No edema. Skin:     General: Skin is warm and dry. Capillary Refill: Capillary refill takes less than 2 seconds.    Neurological:      General: No focal deficit present. Mental Status: She is alert and oriented to person, place, and time. Mental status is at baseline. Cranial Nerves: No cranial nerve deficit. Sensory: No sensory deficit. Motor: No weakness. Gait: Gait normal.       MEDICAL DECISION MAKING   Initial Assessment: 40F w/ PMH asthma presents with new onset chest pain    Differential Diagnosis Included but not limited to:   Viral URI  Asthma exacerbation  Bronchitis  RLL PNA  Less likely   ACS     MDM:   Patient met SIRS criteria on arrival w/ leukocytosis tachypnea and tachycardia and suspected viral URTI. Small infiltrate noted over RLL correlating clinically with RLL rhonchi appreciated on exam. Lactate acid at upper limit of normal. Given sepsis bolus fluids at 30cc/kg and started on empiric ABx with Rocephin and azithromycin. Decision to admit as patient met sepsis criteria. ED RESULTS   Laboratory results:  Labs Reviewed   BASIC METABOLIC PANEL W/ REFLEX TO MG FOR LOW K   BRAIN NATRIURETIC PEPTIDE   CBC WITH AUTO DIFFERENTIAL   TROPONIN       Radiologic studies results:  XR CHEST PORTABLE    (Results Pending)       ED Medications administered this visit: Medications - No data to display      ED COURSE      Patient seen and evaluated at bedside in the ED. Met SIRS/sepsis criteria on arrival with tachycardia, tachypnea and suspected PNA. Initial CXR demonstrated RRL infiltrate. CBC showed leukocytosis WBC 17.2. Lactate WNL. COVID negative. Patient given sepsis bolus fluids at 30cc/kg and was started on empiric ABx w/ Rocephin and azithromycin. Admitted to the hospitalist service for continued evaluation of sepsis PNA. MEDICATION CHANGES     New Prescriptions    No medications on file         FINAL DISPOSITION     Final diagnoses:   None     Condition: condition: stable  Dispo: Admit to med/surg floor      This transcription was electronically signed.  Parts of this transcriptions may have been dictated by use of voice recognition software and electronically transcribed, and parts may have been transcribed with the assistance of an ED scribe. The transcription may contain errors not detected in proofreading. Please refer to my supervising physician's documentation if my documentation differs.     Electronically Signed: Joellen Rincon DO, 05/16/21, 7:29 PM        Joellen Rincon DO  Resident  05/16/21 8644

## 2021-05-17 LAB
BACTERIA: NORMAL
BILIRUBIN URINE: NEGATIVE
BLOOD, URINE: NEGATIVE
CASTS: NORMAL /LPF
CASTS: NORMAL /LPF
CHARACTER, URINE: CLEAR
COLOR: YELLOW
CRYSTALS: NORMAL
EPITHELIAL CELLS, UA: NORMAL /HPF
GLUCOSE, URINE: NEGATIVE MG/DL
KETONES, URINE: NEGATIVE
LEUKOCYTE ESTERASE, URINE: NEGATIVE
MISCELLANEOUS LAB TEST RESULT: NORMAL
MRSA SCREEN RT-PCR: NEGATIVE
NITRITE, URINE: NEGATIVE
PH UA: 6 (ref 5–9)
PROTEIN UA: NEGATIVE MG/DL
RBC URINE: NORMAL /HPF
RENAL EPITHELIAL, UA: NORMAL
SPECIFIC GRAVITY UA: 1.01 (ref 1–1.03)
UROBILINOGEN, URINE: 0.2 EU/DL (ref 0–1)
WBC UA: NORMAL /HPF
YEAST: NORMAL

## 2021-05-17 PROCEDURE — 2580000003 HC RX 258: Performed by: STUDENT IN AN ORGANIZED HEALTH CARE EDUCATION/TRAINING PROGRAM

## 2021-05-17 PROCEDURE — 81001 URINALYSIS AUTO W/SCOPE: CPT

## 2021-05-17 PROCEDURE — 6370000000 HC RX 637 (ALT 250 FOR IP): Performed by: NURSE PRACTITIONER

## 2021-05-17 PROCEDURE — 87086 URINE CULTURE/COLONY COUNT: CPT

## 2021-05-17 PROCEDURE — 6360000002 HC RX W HCPCS: Performed by: NURSE PRACTITIONER

## 2021-05-17 PROCEDURE — 99232 SBSQ HOSP IP/OBS MODERATE 35: CPT | Performed by: NURSE PRACTITIONER

## 2021-05-17 PROCEDURE — 6360000002 HC RX W HCPCS: Performed by: STUDENT IN AN ORGANIZED HEALTH CARE EDUCATION/TRAINING PROGRAM

## 2021-05-17 PROCEDURE — 87641 MR-STAPH DNA AMP PROBE: CPT

## 2021-05-17 PROCEDURE — 1200000000 HC SEMI PRIVATE

## 2021-05-17 PROCEDURE — 2580000003 HC RX 258: Performed by: NURSE PRACTITIONER

## 2021-05-17 RX ORDER — SODIUM CHLORIDE 9 MG/ML
25 INJECTION, SOLUTION INTRAVENOUS PRN
Status: DISCONTINUED | OUTPATIENT
Start: 2021-05-17 | End: 2021-05-18 | Stop reason: HOSPADM

## 2021-05-17 RX ORDER — SODIUM CHLORIDE 0.9 % (FLUSH) 0.9 %
5-40 SYRINGE (ML) INJECTION PRN
Status: DISCONTINUED | OUTPATIENT
Start: 2021-05-17 | End: 2021-05-18 | Stop reason: HOSPADM

## 2021-05-17 RX ORDER — SODIUM CHLORIDE 0.9 % (FLUSH) 0.9 %
5-40 SYRINGE (ML) INJECTION EVERY 12 HOURS SCHEDULED
Status: DISCONTINUED | OUTPATIENT
Start: 2021-05-17 | End: 2021-05-18 | Stop reason: HOSPADM

## 2021-05-17 RX ORDER — ACETAMINOPHEN 650 MG/1
650 SUPPOSITORY RECTAL EVERY 6 HOURS PRN
Status: DISCONTINUED | OUTPATIENT
Start: 2021-05-17 | End: 2021-05-18 | Stop reason: HOSPADM

## 2021-05-17 RX ORDER — HYDROCODONE BITARTRATE AND ACETAMINOPHEN 5; 325 MG/1; MG/1
1 TABLET ORAL EVERY 6 HOURS PRN
Status: DISCONTINUED | OUTPATIENT
Start: 2021-05-17 | End: 2021-05-18 | Stop reason: HOSPADM

## 2021-05-17 RX ORDER — POLYETHYLENE GLYCOL 3350 17 G/17G
17 POWDER, FOR SOLUTION ORAL DAILY PRN
Status: DISCONTINUED | OUTPATIENT
Start: 2021-05-17 | End: 2021-05-18 | Stop reason: HOSPADM

## 2021-05-17 RX ORDER — PROMETHAZINE HYDROCHLORIDE 25 MG/1
12.5 TABLET ORAL EVERY 6 HOURS PRN
Status: DISCONTINUED | OUTPATIENT
Start: 2021-05-17 | End: 2021-05-18 | Stop reason: HOSPADM

## 2021-05-17 RX ORDER — ACETAMINOPHEN 325 MG/1
650 TABLET ORAL EVERY 6 HOURS PRN
Status: DISCONTINUED | OUTPATIENT
Start: 2021-05-17 | End: 2021-05-18 | Stop reason: HOSPADM

## 2021-05-17 RX ORDER — ONDANSETRON 2 MG/ML
4 INJECTION INTRAMUSCULAR; INTRAVENOUS EVERY 6 HOURS PRN
Status: DISCONTINUED | OUTPATIENT
Start: 2021-05-17 | End: 2021-05-18 | Stop reason: HOSPADM

## 2021-05-17 RX ORDER — AZITHROMYCIN 250 MG/1
500 TABLET, FILM COATED ORAL DAILY
Status: DISCONTINUED | OUTPATIENT
Start: 2021-05-17 | End: 2021-05-18 | Stop reason: HOSPADM

## 2021-05-17 RX ADMIN — CEFTRIAXONE SODIUM 1000 MG: 1 INJECTION, POWDER, FOR SOLUTION INTRAMUSCULAR; INTRAVENOUS at 21:26

## 2021-05-17 RX ADMIN — AZITHROMYCIN 500 MG: 250 TABLET, FILM COATED ORAL at 21:25

## 2021-05-17 RX ADMIN — VANCOMYCIN HYDROCHLORIDE 1000 MG: 1 INJECTION, POWDER, LYOPHILIZED, FOR SOLUTION INTRAVENOUS at 02:17

## 2021-05-17 RX ADMIN — SODIUM CHLORIDE 25 ML: 9 INJECTION, SOLUTION INTRAVENOUS at 02:17

## 2021-05-17 RX ADMIN — SODIUM CHLORIDE, PRESERVATIVE FREE 10 ML: 5 INJECTION INTRAVENOUS at 21:26

## 2021-05-17 RX ADMIN — HYDROCODONE BITARTRATE AND ACETAMINOPHEN 1 TABLET: 5; 325 TABLET ORAL at 16:57

## 2021-05-17 RX ADMIN — SODIUM CHLORIDE, PRESERVATIVE FREE 10 ML: 5 INJECTION INTRAVENOUS at 02:20

## 2021-05-17 RX ADMIN — ENOXAPARIN SODIUM 40 MG: 40 INJECTION SUBCUTANEOUS at 09:39

## 2021-05-17 RX ADMIN — PIPERACILLIN AND TAZOBACTAM 3375 MG: 3; .375 INJECTION, POWDER, LYOPHILIZED, FOR SOLUTION INTRAVENOUS at 06:00

## 2021-05-17 ASSESSMENT — PAIN DESCRIPTION - PROGRESSION: CLINICAL_PROGRESSION: GRADUALLY WORSENING

## 2021-05-17 ASSESSMENT — PAIN SCALES - GENERAL
PAINLEVEL_OUTOF10: 0

## 2021-05-17 ASSESSMENT — PAIN DESCRIPTION - FREQUENCY: FREQUENCY: INTERMITTENT

## 2021-05-17 ASSESSMENT — PAIN DESCRIPTION - LOCATION: LOCATION: RIB CAGE;SHOULDER

## 2021-05-17 ASSESSMENT — PAIN DESCRIPTION - PAIN TYPE: TYPE: ACUTE PAIN

## 2021-05-17 NOTE — PROGRESS NOTES
Pharmacy Note  Vancomycin Consult    April D Gerber Humphrey is a 45 y.o. female started on Vancomycin for sepsis; consult received from Dr. Mari Loredo to manage therapy. Also receiving the following antibiotics: Zosyn. Patient Active Problem List   Diagnosis    Diverticulitis    Hx of leukocytosis    Acute hypokalemia    Nicotine dependence    History of asthma    History of seizure    Tachycardia    Generalized abdominal pain    GERD (gastroesophageal reflux disease)    Colitis    Acute asthma exacerbation    Tobacco abuse    Hypokalemia    Hypomagnesemia    Fever    Shortness of breath    Sepsis without acute organ dysfunction (HCC)    Acute bacterial tonsillitis    Bacterial pneumonia    Volume depletion    Steroid-induced hyperglycemia    Peritonsillar abscess    Sepsis due to pneumonia (HCC)     Allergies:  Aspirin     Temp max: afebrile    Recent Labs     05/16/21  1910   BUN 10   CREATININE 0.9   WBC 17.2*     No intake or output data in the 24 hours ending 05/17/21 0042    Culture Date Source Results   5/16/21 Blood x 2    5/16/21 Urine          Ht Readings from Last 1 Encounters:   05/17/21 5' 4\" (1.626 m)        Wt Readings from Last 1 Encounters:   05/17/21 179 lb 3.7 oz (81.3 kg)       Body mass index is 30.77 kg/m². Estimated Creatinine Clearance: 87 mL/min (based on SCr of 0.9 mg/dL). Goal Trough Level: 15-20 mcg/mL    Assessment/Plan:  Will initiate Vancomycin 1000 mg IV every 8 hours (based on age). Timing of trough level will be determined based on culture results, renal function, and clinical response. Thank you for the consult. Will continue to follow.     Ava Torres RP, BCPS, BCGP  5/17/2021     12:44 AM

## 2021-05-17 NOTE — ED NOTES
ED to inpatient nurses report    Chief Complaint   Patient presents with    Chest Pain      Present to ED from home  LOC: alert and orientated to name, place, date  Vital signs   Vitals:    05/16/21 2005 05/16/21 2108 05/16/21 2211 05/16/21 2315   BP: 113/87 119/88 (!) 140/99 (!) 138/90   Pulse: 98 97 91 80   Resp: 20 20 20 18   Temp:       TempSrc:       SpO2: 96% 96% 95% 95%   Weight:       Height:          Oxygen Baseline room air     Current needs required none   LDAs:   Peripheral IV 05/16/21 Left Antecubital (Active)   Site Assessment Clean;Dry; Intact 05/16/21 2211   Line Status Infusing 05/16/21 2211   Dressing Status Clean;Dry; Intact 05/16/21 2211     Mobility: Independent  Pending ED orders: none  Present condition: stable      Electronically signed by Yuki Gil RN on 5/16/2021 at 11:18 PM     Yuki Gil RN  05/16/21 5095

## 2021-05-17 NOTE — CARE COORDINATION
5/17/21, 7:42 AM EDT  DISCHARGE PLANNING EVALUATION:    April D Ajay Sample       Admitted: 5/16/2021/ [facility] day: 1   Location: [address] [place] [address]- Reason for admit: Sepsis due to pneumonia ([facility] [address] [J18.9, A41.9]   PMH:  has a past medical history of Asthma and MDRO (multiple drug resistant organisms) resistance. Procedure: No.  Barriers to Discharge: To ER with CP. Current smokier. Recent tx for sepsis URI. Rhonchi, Tachypnea. Troponin neg. Possible pneumonia. Zosyn and Vanc. PCP: DREA Montenegro CNP  Readmission Risk Score: 7%    Patient Goals/Plan/Treatment Preferences: Met with April this morning. She is from home with spouse. She is self sufficient. She has no home services or DME. She has a PCP, her spouse provides her transportation. She works at [employer]. She denies issues getting medications. No home going needs voiced at present time. Transportation/Food Security/Housekeeping Addressed:  No issues identified.

## 2021-05-17 NOTE — PROGRESS NOTES
Patient admitted to  32. Admission assessment data collection initiated, Hospitalist in room also. Call light within reach. Denies any needs at this time.

## 2021-05-17 NOTE — PROGRESS NOTES
Physician Progress Note      PATIENT:               Hakan Devi  CSN #:                  [id number]  :                       1980  ADMIT DATE:       2021 7:08 PM  [address] [place] DATE:  RESPONDING  PROVIDER #:        Ruth Padilla CNP          QUERY TEXT:    Patient admitted with pneumonia. Documentation reflects sepsis in H&P dated   . If possible, please document in the progress notes and discharge   summary if sepsis was: The medical record reflects the following:    Risk Factors: pneumonia  Clinical Indicators: WBC 17.2, Lactic WNL, PCT WNL, HR , no tachypnea   noted, afebrile  Treatment: IVF bolus, IV Rocephin, Zosyn x1, IV Vancomycin x1    Thank you! Kel Siegel, CRCR  RN Clinical   P: [phone]  Options provided:  -- Sepsis ruled out after study  -- Sepsis confirmed after study  -- Sepsis treated and resolved  -- Other - I will add my own diagnosis  -- Disagree - Not applicable / Not valid  -- Disagree - Clinically unable to determine / Unknown  -- Refer to Clinical Documentation Reviewer    PROVIDER RESPONSE TEXT:    Sepsis ruled out after study.     Query created by: Evita Nolan on 2021 11:53 AM      Electronically signed by:  Ruth Padilla CNP 2021 12:22 PM

## 2021-05-17 NOTE — ED NOTES
Pt resting on cot in stable condition. Voices no new concerns at this time. Vitals stable.  Pain remains 6/10 at this time in left chest. Lights dimmed for comfort     Mitra Kilgore RN  05/16/21 [id number]

## 2021-05-17 NOTE — PROGRESS NOTES
Hospitalist Progress Note    Patient:  April D Suraj      Unit/Bed:8B-32/032-A    YOB: 1980    MRN: [id number]       Acct: [de-identified]     PCP: DREA Burch CNP    Date of Admission: 5/16/2021    Assessment/Plan:    1. Pneumonia, right base (POA) possibly secondary to gram-negative bacilli--we will stop Zosyn and vancomycin; Rocephin 5/16 and will continue, Zithromax 5/16 and will continue however changed to oral; normal procalcitonin; afebrile, on room air  2. Chronic leukocytosis  3. Costochondritis--has Tylenol, will add Norco for severe pain  4. Tobacco abuse--cessation counseling, patient did not want a nicotine patch  5. Obesity with BMI 30.77    Expected discharge date: 1 to 2 days    Disposition:    [] Home       [] TCU       [] Rehab       [] Psych       [] SNF       [] [place]       [] Other-    Chief Complaint: Cough and weakness    Hospital Course: Per initial H&P dated 5/16: Jeannie Chen is a 45 y.o. female with PMHx of daily smoker, gout, MRSA of left foot, and childhood asthma who presented to [facility] with complaint of productive cough with yellow sputum and weakness for 2 days. She suspects that this is pneumonia as her symptoms are similar to those in the past when she has had pneumonia. She stated that she gets this approximately 2 times per year for the past 2-3 years. She stated that she took Tylenol at home which had mild effect on her symptoms. She decided to report to the ED because her symptoms were not resolving. The patient denies headache, dizziness, change in vision, shortness of breath, change in vision, abdominal pain, nausea, vomiting, change in bowel/bladder habits, unexplained weight change, or known sick contacts. The patient admits to cough, chest pain secondary to cough, and weakness. \"    5/17--> hemodynamically stable, afebrile, on room air, RN states patient refused the influenza swab, electrolytes are normal, I discussed with patient regarding her chronic leukocytosis dating back the last few years and patient cannot remember being told about this; I descalated antibiotics    Subjective (past 24 hours): Patient states she started about 3 days ago feeling \"blah\", states she had a productive cough which is better now and occasional shortness of breath, denies any lightheadedness or dizziness, denies nausea, does relate to generalized chest pain with coughing      Medications:  Reviewed    Infusion Medications    sodium chloride 25 mL (05/17/21 0217)     Scheduled Medications    sodium chloride flush  5-40 mL Intravenous 2 times per day    piperacillin-tazobactam  3,375 mg Intravenous Q8H    vancomycin (VANCOCIN) intermittent dosing (placeholder)   Other RX Placeholder    vancomycin  1,000 mg Intravenous Q8H     PRN Meds: sodium chloride flush, sodium chloride, promethazine **OR** ondansetron, polyethylene glycol, acetaminophen **OR** acetaminophen      Intake/Output Summary (Last 24 hours) at 5/17/2021 0823  Last data filed at 5/17/2021 0220  Gross per 24 hour   Intake 10 ml   Output    Net 10 ml       Diet:  DIET GENERAL;    Exam:  /80   Pulse 78   Temp 98.2 °F (36.8 °C) (Oral)   Resp 20   Ht 5' 4\" (1.626 m)   Wt 179 lb 3.7 oz (81.3 kg)   LMP 08/17/2015   SpO2 97%   BMI 30.77 kg/m²     General appearance: No apparent distress, appears stated age and cooperative. HEENT: Pupils equal, round, and reactive to light. Conjunctivae/corneas clear. Neck: Supple, with full range of motion. No jugular venous distention. Trachea midline. Respiratory:  Normal respiratory effort. Clear to auscultation, bilaterally without Rales/Wheezes/Rhonchi. Cardiovascular: Regular rate and rhythm with normal S1/S2 without murmurs, rubs or gallops. Abdomen: Soft, non-tender, non-distended with normal bowel sounds. Musculoskeletal: passive and active ROM x 4 extremities.   Skin: Skin color, texture, turgor normal. Neurologic:  Neurovascularly intact without any focal sensory/motor deficits. Cranial nerves: II-XII intact, grossly non-focal.  Psychiatric: Alert and oriented, thought content appropriate  Capillary Refill: Brisk,< 3 seconds   Peripheral Pulses: +2 palpable, equal bilaterally       Labs:   Recent Labs     05/16/21 1910   WBC 17.2*   HGB 14.9   HCT 42.1        Recent Labs     05/16/21 1910      K 4.1      CO2 26   BUN 10   CREATININE 0.9   CALCIUM 9.4       Recent Labs     05/16/21 1910   PROCAL < 0.02     Microbiology:    Urine culture is pending  Influenza screen: patient refused  COVID-19 not detected  2 blood cultures are pending    Urinalysis:      Lab Results   Component Value Date    NITRU NEGATIVE 05/17/2021    WBCUA 0-2 05/17/2021    BACTERIA NONE SEEN 05/17/2021    RBCUA 0-2 05/17/2021    BLOODU NEGATIVE 05/17/2021    SPECGRAV 1.009 05/17/2021    GLUCOSEU NEGATIVE 11/25/2017       Radiology:  XR CHEST PORTABLE    Result Date: 5/16/2021  PROCEDURE: XR CHEST PORTABLE CLINICAL INFORMATION: SOB COMPARISON: 11/30/2020 TECHNIQUE:  AP mobile chest single view  FINDINGS: Minimal airspace opacities are seen overlying the right lung base which represent minimal atelectasis or pneumonia. The heart size is normal. No pleural effusion or pneumothorax is seen. No acute osseous findings are demonstrated. 1. Minimal airspace opacities are seen overlying the right lung base which represent minimal atelectasis or pneumonia. **This report has been created using voice recognition software. It may contain minor errors which are inherent in voice recognition technology. ** Final report electronically signed by Dr. Iqra Banda on 5/16/2021 7:43 PM      DVT prophylaxis: [x] Lovenox~add                                 [x] SCDs                                 [] SQ Heparin                                 [] Encourage ambulation           [] Already on Anticoagulation     Code Status: Full Code    Tele: [] yes             [x] no    Active Hospital Problems    Diagnosis Date Noted    Sepsis due to pneumonia [facility]) [J18.9, A41.9] 05/16/2021       Electronically signed by DREA Mckinney CNP on 5/17/2021 at 8:48 AM

## 2021-05-17 NOTE — ED NOTES
Pt medicated per MAR. Updated on POC. Verbalized understanding. Vitals stable.       Mary Pink RN  05/16/21 2221

## 2021-05-17 NOTE — ED NOTES
Pt vitals stable. covid swab completed and sent to the lab. Call light in reach.  Will monitor      Shana Sam RN  05/16/21 2008

## 2021-05-17 NOTE — H&P
in bowel/bladder habits, unexplained weight change, or known sick contacts. The patient admits to cough, chest pain secondary to cough, and weakness. ED course and workup revealed:  Patient presented with sepsis (tachympnea RR=21 and WBC=17.2), and given 30cc/kg IDW fluid resuscitation, blood cultures drawn, lactic=1.5, procalcitonin<0.02, COVID-19 negative. Patient was administered 1 dose azithromycin and 1 dose ceftriaxone. CXR - with right lobe opacities. EKG - NSR. She was admitted to the hospital service for further care and management. See A&P above for more information. Past Medical History:  Past Medical History:   Diagnosis Date    Asthma     MDRO (multiple drug resistant organisms) resistance 2013    left great toe     Past Surgical History:  Past Surgical History:   Procedure Laterality Date    APPENDECTOMY      DILATION AND CURETTAGE OF UTERUS      ENDOMETRIAL ABLATION  08/13/14    with hysteroscopy, D&C    HYSTERECTOMY      TUBAL LIGATION       Medications Prior to Admission:  No current facility-administered medications on file prior to encounter.      Current Outpatient Medications on File Prior to Encounter   Medication Sig Dispense Refill    indomethacin (INDOCIN) 50 MG capsule Take 1 capsule by mouth 3 times daily (with meals) for 10 days 30 capsule 0    dextromethorphan-guaiFENesin (MUCINEX DM)  MG per extended release tablet Take 1 tablet by mouth every 12 hours as needed for Cough Take only until cough is resolved 60 tablet 0    fluticasone-salmeterol (ADVAIR) 100-50 MCG/DOSE diskus inhaler Inhale 1 puff into the lungs every 12 hours 60 each 3    acetaminophen (TYLENOL) 500 MG tablet Take 1 tablet by mouth 4 times daily as needed for Pain 360 tablet 1    magnesium oxide (MAG-OX) 400 (240 Mg) MG tablet Take 1 tablet by mouth 2 times daily 60 tablet 0    ibuprofen (ADVIL;MOTRIN) 200 MG tablet Take 400 mg by mouth every 6 hours as needed for Pain       Allergies: Aspirin    Social History:  Social History     Socioeconomic History    Marital status:      Spouse name: Not on file    Number of children: Not on file    Years of education: Not on file    Highest education level: Not on file   Occupational History    Not on file   Tobacco Use    Smoking status: Current Every Day Smoker     Packs/day: 0.50     Years: 20.00     Pack years: 10.00     Types: Cigarettes    Smokeless tobacco: Never Used   Substance and Sexual Activity    Alcohol use: No    Drug use: No    Sexual activity: Yes     Partners: Male   Other Topics Concern    Not on file   Social History Narrative    Not on file     Social Determinants of Health     Financial Resource Strain:     Difficulty of Paying Living Expenses:    Food Insecurity:     Worried About Running Out of Food in the Last Year:     Ran Out of Food in the Last Year:    Transportation Needs:     Lack of Transportation (Medical):      Lack of Transportation (Non-Medical):    Physical Activity:     Days of Exercise per Week:     Minutes of Exercise per Session:    Stress:     Feeling of Stress :    Social Connections:     Frequency of Communication with Friends and Family:     Frequency of Social Gatherings with Friends and Family:     Attends Religion Services:     Active Member of Clubs or Organizations:     Attends Club or Organization Meetings:     Marital Status:    Intimate Partner Violence:     Fear of Current or Ex-Partner:     Emotionally Abused:     Physically Abused:     Sexually Abused:      Family History:   Family History   Problem Relation Age of Onset    Lung Cancer Maternal Grandmother 79    Cancer Maternal Grandmother     Heart Disease Maternal Grandmother     High Blood Pressure Maternal Grandmother     Asthma Maternal Grandmother     Asthma Mother     Asthma Father     Diabetes Maternal Grandfather     Kidney Disease Maternal Grandfather      REVIEW OF SYSTEMS:  A 14-point ROS was obtained and negative, with the exception of pertinent positives as listed below:    Cough, weakness, chest pain    PHYSICAL EXAM:  Vitals:    05/16/21 2005 05/16/21 2108 05/16/21 2211 05/16/21 2315   BP: 113/87 119/88 (!) 140/99 (!) 138/90   Pulse: 98 97 91 80   Resp: 20 20 20 18   Temp:       TempSrc:       SpO2: 96% 96% 95% 95%   Weight:       Height:         Physical Exam  Vitals and nursing note reviewed. Constitutional:       General: She is awake. She is not in acute distress. Appearance: Normal appearance. She is obese. She is not ill-appearing, toxic-appearing or diaphoretic. HENT:      Head: Normocephalic and atraumatic. Right Ear: External ear normal.      Left Ear: External ear normal.      Nose: Nose normal.      Mouth/Throat:      Mouth: Mucous membranes are moist.      Pharynx: Oropharynx is clear. Eyes:      Conjunctiva/sclera: Conjunctivae normal.      Pupils: Pupils are equal, round, and reactive to light. Neck:      Thyroid: No thyroid mass, thyromegaly or thyroid tenderness. Vascular: No carotid bruit. Trachea: Trachea and phonation normal.   Cardiovascular:      Rate and Rhythm: Normal rate and regular rhythm. Pulses: Normal pulses. Dorsalis pedis pulses are 2+ on the right side and 2+ on the left side. Posterior tibial pulses are 2+ on the right side and 2+ on the left side. Heart sounds: Normal heart sounds. Pulmonary:      Effort: Pulmonary effort is normal. No tachypnea or accessory muscle usage. Breath sounds: Normal breath sounds. No decreased air movement. No decreased breath sounds, wheezing, rhonchi or rales. Abdominal:      General: Bowel sounds are normal.      Palpations: Abdomen is soft. Tenderness: There is no abdominal tenderness. Musculoskeletal:         General: Normal range of motion. Cervical back: Full passive range of motion without pain, normal range of motion and neck supple.       Right lower leg: No edema. Left lower leg: No edema. Lymphadenopathy:      Cervical: No cervical adenopathy. Skin:     General: Skin is warm and dry. Capillary Refill: Capillary refill takes less than 2 seconds. Neurological:      General: No focal deficit present. Mental Status: She is alert and oriented to person, place, and time. Mental status is at baseline. GCS: GCS eye subscore is 4. GCS verbal subscore is 5. GCS motor subscore is 6. Sensory: Sensation is intact. Motor: Motor function is intact. Psychiatric:         Attention and Perception: Attention and perception normal.         Mood and Affect: Mood and affect normal.         Speech: Speech normal.         Behavior: Behavior normal. Behavior is cooperative. Thought Content:  Thought content normal.         Cognition and Memory: Cognition and memory normal.         Judgment: Judgment normal.           Labs:  Results for orders placed or performed during the hospital encounter of 05/16/21   COVID-19, Rapid    Specimen: Nasopharyngeal Swab   Result Value Ref Range    SARS-CoV-2, NAAT NOT DETECTED NOT DETECTED   Basic Metabolic Panel w/ Reflex to MG   Result Value Ref Range    Sodium 137 135 - 145 meq/L    Potassium reflex Magnesium 4.1 3.5 - 5.2 meq/L    Chloride 100 98 - 111 meq/L    CO2 26 23 - 33 meq/L    Glucose 108 70 - 108 mg/dL    BUN 10 7 - 22 mg/dL    CREATININE 0.9 0.4 - 1.2 mg/dL    Calcium 9.4 8.5 - 10.5 mg/dL   Brain Natriuretic Peptide   Result Value Ref Range    Pro-BNP 73.1 0.0 - 450.0 pg/mL   CBC Auto Differential   Result Value Ref Range    WBC 17.2 (H) 4.8 - 10.8 thou/mm3    RBC 4.71 4.20 - 5.40 mill/mm3    Hemoglobin 14.9 12.0 - 16.0 gm/dl    Hematocrit 42.1 37.0 - 47.0 %    MCV 89.4 81.0 - 99.0 fL    MCH 31.6 26.0 - 33.0 pg    MCHC 35.4 32.2 - 35.5 gm/dl    RDW-CV 12.9 11.5 - 14.5 %    RDW-SD 42.1 35.0 - 45.0 fL    Platelets 742 592 - 834 thou/mm3    MPV 10.1 9.4 - 12.4 fL    Seg Neutrophils 61.5 % atelectasis or pneumonia. **This report has been created using voice recognition software. It may contain minor errors which are inherent in voice recognition technology. ** Final report electronically signed by Dr. Sara Isbell on 5/16/2021 7:43 PM    FEN/GI/DVT:  IVF: 30cc/kg IDW  Electrolytes: Monitor and replace per protocols  Diet: General  GI PPX: No  DVT Prophylaxis: SCDs    CODE STATUS:  Full    Active Hospital Problems    Diagnosis Date Noted    Sepsis due to pneumonia [facility]) [J18.9, A41.9] 05/16/2021     Thank you DREA Buckley - FERNANDA for the opportunity to be involved in this patient's care.     Electronically signed by Russel Long DO on 5/16/2021 at 11:17 PM

## 2021-05-18 VITALS
TEMPERATURE: 98.2 F | WEIGHT: 179.23 LBS | DIASTOLIC BLOOD PRESSURE: 74 MMHG | HEART RATE: 64 BPM | OXYGEN SATURATION: 95 % | SYSTOLIC BLOOD PRESSURE: 113 MMHG | HEIGHT: 64 IN | BODY MASS INDEX: 30.6 KG/M2 | RESPIRATION RATE: 18 BRPM

## 2021-05-18 LAB
ANION GAP SERPL CALCULATED.3IONS-SCNC: 11 MEQ/L (ref 8–16)
BUN BLDV-MCNC: 10 MG/DL (ref 7–22)
CALCIUM SERPL-MCNC: 9.4 MG/DL (ref 8.5–10.5)
CHLORIDE BLD-SCNC: 101 MEQ/L (ref 98–111)
CO2: 26 MEQ/L (ref 23–33)
CREAT SERPL-MCNC: 0.8 MG/DL (ref 0.4–1.2)
EKG ATRIAL RATE: 97 BPM
EKG P AXIS: 43 DEGREES
EKG P-R INTERVAL: 132 MS
EKG Q-T INTERVAL: 348 MS
EKG QRS DURATION: 86 MS
EKG QTC CALCULATION (BAZETT): 441 MS
EKG R AXIS: 58 DEGREES
EKG T AXIS: 50 DEGREES
EKG VENTRICULAR RATE: 97 BPM
ERYTHROCYTE [DISTWIDTH] IN BLOOD BY AUTOMATED COUNT: 12.9 % (ref 11.5–14.5)
ERYTHROCYTE [DISTWIDTH] IN BLOOD BY AUTOMATED COUNT: 42.1 FL (ref 35–45)
GFR SERPL CREATININE-BSD FRML MDRD: 79 ML/MIN/1.73M2
GLUCOSE BLD-MCNC: 99 MG/DL (ref 70–108)
HCT VFR BLD CALC: 42.6 % (ref 37–47)
HEMOGLOBIN: 14.2 GM/DL (ref 12–16)
MCH RBC QN AUTO: 30.3 PG (ref 26–33)
MCHC RBC AUTO-ENTMCNC: 33.3 GM/DL (ref 32.2–35.5)
MCV RBC AUTO: 90.8 FL (ref 81–99)
ORGANISM: ABNORMAL
PLATELET # BLD: 216 THOU/MM3 (ref 130–400)
PMV BLD AUTO: 10.5 FL (ref 9.4–12.4)
POTASSIUM SERPL-SCNC: 3.6 MEQ/L (ref 3.5–5.2)
RBC # BLD: 4.69 MILL/MM3 (ref 4.2–5.4)
REASON FOR REJECTION: NORMAL
REJECTED TEST: NORMAL
SODIUM BLD-SCNC: 138 MEQ/L (ref 135–145)
URINE CULTURE, ROUTINE: ABNORMAL
WBC # BLD: 14.2 THOU/MM3 (ref 4.8–10.8)

## 2021-05-18 PROCEDURE — 85027 COMPLETE CBC AUTOMATED: CPT

## 2021-05-18 PROCEDURE — 2580000003 HC RX 258: Performed by: STUDENT IN AN ORGANIZED HEALTH CARE EDUCATION/TRAINING PROGRAM

## 2021-05-18 PROCEDURE — 36415 COLL VENOUS BLD VENIPUNCTURE: CPT

## 2021-05-18 PROCEDURE — 80048 BASIC METABOLIC PNL TOTAL CA: CPT

## 2021-05-18 PROCEDURE — 99239 HOSP IP/OBS DSCHRG MGMT >30: CPT | Performed by: NURSE PRACTITIONER

## 2021-05-18 RX ORDER — AZITHROMYCIN 250 MG/1
250 TABLET, FILM COATED ORAL DAILY
Qty: 3 TABLET | Refills: 0 | Status: SHIPPED | OUTPATIENT
Start: 2021-05-18 | End: 2021-05-21

## 2021-05-18 RX ADMIN — SODIUM CHLORIDE, PRESERVATIVE FREE 10 ML: 5 INJECTION INTRAVENOUS at 07:35

## 2021-05-18 ASSESSMENT — PAIN SCALES - GENERAL: PAINLEVEL_OUTOF10: 0

## 2021-05-18 NOTE — DISCHARGE SUMMARY
Hospital Medicine Discharge Summary      Patient Identification:   Ivy Tyler   : 1980  MRN: [id number]   Account: [de-identified]      Patient's PCP: DREA Galarza CNP    Admit Date: 2021     Discharge Date: 2021      Admitting Physician: Alexandr Montes MD     Discharging Nurse Practitioner: DREA Franco CNP     Discharge Diagnoses with Assessment/Plan:  1. Pneumonia, right base (POA) possibly secondary to gram-negative bacilli--Rocephin 5/16 x 2 doses total, Zithromax  and will continue for 3 more days for total of 5 days; normal procalcitonin; afebrile, on room air  2. Chronic leukocytosis  3. Costochondritis--resolved  4. Tobacco abuse--cessation counseling, patient did not want a nicotine patch  5. Obesity with BMI 30.77     The patient was seen and examined on day of discharge and this discharge summary is in conjunction with any daily progress note from day of discharge. Hospital Course:   Ivy Tyler is a 45 y.o. female admitted to [address] [place] [address] on 2021 for cough and weakness; Per initial H&P dated : \"Ivy Ruelas is a 45 y.o. female with PMHx of daily smoker, gout, MRSA of left foot, and childhood asthma who presented to [address] [place] [address] with complaint of productive cough with yellow sputum and weakness for 2 days. She suspects that this is pneumonia as her symptoms are similar to those in the past when she has had pneumonia. She stated that she gets this approximately 2 times per year for the past 2-3 years. She stated that she took Tylenol at home which had mild effect on her symptoms. She decided to report to the ED because her symptoms were not resolving. The patient denies headache, dizziness, change in vision, shortness of breath, change in vision, abdominal pain, nausea, vomiting, change in bowel/bladder habits, unexplained weight change, or known sick contacts.  The patient admits to cough, chest pain secondary to cough, and weakness. \"     5/17--> hemodynamically stable, afebrile, on room air, RN states patient refused the influenza swab, electrolytes are normal, I discussed with patient regarding her chronic leukocytosis dating back the last few years and patient cannot remember being told about this; I descalated antibiotics     5/18--> she feels good, offers no complaints, hemodynamically stable; white count improved, patient states overall she feels much better and feels very comfortable going home, we discussed continuing her Zithromax for 3 more days and she needs to follow-up with her PCP, at this time she is stable for discharge home             Exam:     Vitals:  Vitals:    05/17/21 2030 05/18/21 0030 05/18/21 0314 05/18/21 0730   BP: 137/89 128/86 114/72 113/74   Pulse: 67 68 66 64   Resp: 18 18 18 18   Temp: 97.8 °F (36.6 °C) 98 °F (36.7 °C) 97.9 °F (36.6 °C) 98.2 °F (36.8 °C)   TempSrc: Oral Oral Oral Oral   SpO2: 96%  96% 95%   Weight:       Height:         Weight: Weight: 179 lb 3.7 oz (81.3 kg)     24 hour intake/output:No intake or output data in the 24 hours ending 05/18/21 1019      General appearance:  No apparent distress, appears stated age and cooperative. HEENT:  Normal cephalic, atraumatic without obvious deformity. Pupils equal, round, and reactive to light. Conjunctivae/corneas clear. Neck: Supple, with full range of motion. No jugular venous distention. Trachea midline. Respiratory:  Normal respiratory effort. Clear to auscultation, bilaterally without Rales/Wheezes/Rhonchi. Cardiovascular:  Regular rate and rhythm with normal S1/S2 without murmurs, rubs or gallops. Abdomen: Soft, non-tender, non-distended with normal bowel sounds. Musculoskeletal:  No clubbing, cyanosis or edema bilaterally. Full range of motion without deformity. Skin: Skin color, texture, turgor normal.    Neurologic:  Neurovascularly intact without any focal sensory/motor deficits.  Cranial nerves: II-XII intact, grossly non-focal.  Psychiatric:  Alert and oriented, thought content appropriate  Capillary Refill: Brisk,< 3 seconds   Peripheral Pulses: +2 palpable, equal bilaterally       Labs: For convenience and continuity at follow-up the following most recent labs are provided:      CBC:    Lab Results   Component Value Date    WBC 14.2 05/18/2021    HGB 14.2 05/18/2021    HCT 42.6 05/18/2021     05/18/2021       Renal:    Lab Results   Component Value Date     05/18/2021    K 3.6 05/18/2021    K 4.1 05/16/2021     05/18/2021    CO2 26 05/18/2021    BUN 10 05/18/2021    CREATININE 0.8 05/18/2021    CALCIUM 9.4 05/18/2021       Cardiac:   Recent Labs     05/16/21 1910   TROPONINT < 0.010       Significant Diagnostic Studies    Radiology:   XR CHEST PORTABLE   Final Result   1. Minimal airspace opacities are seen overlying the right lung base which represent minimal atelectasis or pneumonia. **This report has been created using voice recognition software. It may contain minor errors which are inherent in voice recognition technology. **      Final report electronically signed by Dr. Wilson Zaman on 5/16/2021 7:43 PM             Consults:     PHARMACY TO DOSE VANCOMYCIN    Disposition:    [x] Home       [] TCU       [] Rehab       [] Psych       [] SNF       [] [facility]       [] Other-    Condition at Discharge: Stable    Code Status:  Full Code     Pending tests at discharge: 2 blood cultures are pending no growth to date    Patient Instructions:    Discharge lab work: none  Activity: activity as tolerated  Diet: DIET GENERAL;      Follow-up visits:    DREA Pena CNP  [address] [place] [address]    On 5/26/2021  9:00am         Discharge Medications:      Admina Radhaer   Home Medication Instructions TOY:[id number]    Printed on:05/18/21 1019   Medication Information                      acetaminophen (TYLENOL) 500 MG tablet  Take 1 tablet by mouth 4 times daily as needed for Pain             azithromycin (ZITHROMAX) 250 MG tablet  Take 1 tablet by mouth daily for 3 days             ibuprofen (ADVIL;MOTRIN) 200 MG tablet  Take 400 mg by mouth every 6 hours as needed for Pain                 Time Spent on discharge is more than 45 minutes in the examination, evaluation, counseling and review of medications and discharge plan. Signed: Thank you [facility], APRN - CNP for the opportunity to be involved in this patient's care.     Electronically signed by DREA Lee CNP on 5/18/2021 at 10:19 AM

## 2021-05-18 NOTE — PROGRESS NOTES
Patient educated on discharge instructions, medications and follow up appointments. No concerns voiced at this time. Patient to be discharged with family with all belongings.

## 2021-05-22 LAB
BLOOD CULTURE, ROUTINE: NORMAL
BLOOD CULTURE, ROUTINE: NORMAL

## 2021-09-26 ENCOUNTER — HOSPITAL ENCOUNTER (EMERGENCY)
Age: 41
Discharge: HOME | End: 2021-09-27
Attending: EMERGENCY MEDICINE
Payer: MEDICARE

## 2021-09-26 ENCOUNTER — APPOINTMENT (OUTPATIENT)
Dept: GENERAL RADIOLOGY | Age: 41
End: 2021-09-26
Payer: MEDICARE

## 2021-09-26 DIAGNOSIS — R07.89 OTHER CHEST PAIN: Primary | ICD-10-CM

## 2021-09-26 DIAGNOSIS — D72.829 LEUKOCYTOSIS, UNSPECIFIED TYPE: ICD-10-CM

## 2021-09-26 DIAGNOSIS — E83.42 HYPOMAGNESEMIA: ICD-10-CM

## 2021-09-26 DIAGNOSIS — E87.6 HYPOKALEMIA: ICD-10-CM

## 2021-09-26 LAB
ANION GAP SERPL CALCULATED.3IONS-SCNC: 13 MEQ/L (ref 8–16)
BUN BLDV-MCNC: 13 MG/DL (ref 7–22)
CALCIUM SERPL-MCNC: 9.2 MG/DL (ref 8.5–10.5)
CHLORIDE BLD-SCNC: 99 MEQ/L (ref 98–111)
CO2: 24 MEQ/L (ref 23–33)
CREAT SERPL-MCNC: 0.7 MG/DL (ref 0.4–1.2)
D-DIMER QUANTITATIVE: 518 NG/ML FEU (ref 0–500)
GFR SERPL CREATININE-BSD FRML MDRD: > 90 ML/MIN/1.73M2
GLUCOSE BLD-MCNC: 130 MG/DL (ref 70–108)
OSMOLALITY CALCULATION: 273.8 MOSMOL/KG (ref 275–300)
POTASSIUM REFLEX MAGNESIUM: 3 MEQ/L (ref 3.5–5.2)
SODIUM BLD-SCNC: 136 MEQ/L (ref 135–145)
TROPONIN T: < 0.01 NG/ML

## 2021-09-26 PROCEDURE — 96366 THER/PROPH/DIAG IV INF ADDON: CPT

## 2021-09-26 PROCEDURE — 85379 FIBRIN DEGRADATION QUANT: CPT

## 2021-09-26 PROCEDURE — 96368 THER/DIAG CONCURRENT INF: CPT

## 2021-09-26 PROCEDURE — 84484 ASSAY OF TROPONIN QUANT: CPT

## 2021-09-26 PROCEDURE — 71046 X-RAY EXAM CHEST 2 VIEWS: CPT

## 2021-09-26 PROCEDURE — 80048 BASIC METABOLIC PNL TOTAL CA: CPT

## 2021-09-26 PROCEDURE — 99284 EMERGENCY DEPT VISIT MOD MDM: CPT

## 2021-09-26 PROCEDURE — 2580000003 HC RX 258: Performed by: EMERGENCY MEDICINE

## 2021-09-26 PROCEDURE — 96360 HYDRATION IV INFUSION INIT: CPT

## 2021-09-26 PROCEDURE — 85025 COMPLETE CBC W/AUTO DIFF WBC: CPT

## 2021-09-26 PROCEDURE — 83735 ASSAY OF MAGNESIUM: CPT

## 2021-09-26 PROCEDURE — 36415 COLL VENOUS BLD VENIPUNCTURE: CPT

## 2021-09-26 PROCEDURE — 93005 ELECTROCARDIOGRAM TRACING: CPT | Performed by: EMERGENCY MEDICINE

## 2021-09-26 PROCEDURE — 96361 HYDRATE IV INFUSION ADD-ON: CPT

## 2021-09-26 PROCEDURE — 6370000000 HC RX 637 (ALT 250 FOR IP): Performed by: EMERGENCY MEDICINE

## 2021-09-26 PROCEDURE — 96365 THER/PROPH/DIAG IV INF INIT: CPT

## 2021-09-26 RX ORDER — SODIUM CHLORIDE, SODIUM LACTATE, POTASSIUM CHLORIDE, CALCIUM CHLORIDE 600; 310; 30; 20 MG/100ML; MG/100ML; MG/100ML; MG/100ML
1000 INJECTION, SOLUTION INTRAVENOUS ONCE
Status: COMPLETED | OUTPATIENT
Start: 2021-09-27 | End: 2021-09-27

## 2021-09-26 RX ORDER — POTASSIUM CHLORIDE 20 MEQ/1
40 TABLET, EXTENDED RELEASE ORAL ONCE
Status: COMPLETED | OUTPATIENT
Start: 2021-09-27 | End: 2021-09-27

## 2021-09-26 RX ORDER — OXYCODONE HYDROCHLORIDE AND ACETAMINOPHEN 5; 325 MG/1; MG/1
1 TABLET ORAL ONCE
Status: COMPLETED | OUTPATIENT
Start: 2021-09-26 | End: 2021-09-26

## 2021-09-26 RX ADMIN — SODIUM CHLORIDE, POTASSIUM CHLORIDE, SODIUM LACTATE AND CALCIUM CHLORIDE 1000 ML: 600; 310; 30; 20 INJECTION, SOLUTION INTRAVENOUS at 23:56

## 2021-09-26 RX ADMIN — OXYCODONE HYDROCHLORIDE AND ACETAMINOPHEN 1 TABLET: 5; 325 TABLET ORAL at 23:14

## 2021-09-26 ASSESSMENT — PAIN SCALES - GENERAL: PAINLEVEL_OUTOF10: 7

## 2021-09-26 ASSESSMENT — ENCOUNTER SYMPTOMS
COUGH: 0
DIARRHEA: 0
SINUS PRESSURE: 0
ABDOMINAL PAIN: 0
CONSTIPATION: 0
SHORTNESS OF BREATH: 0
BACK PAIN: 0
NAUSEA: 0
EYE PAIN: 0
SINUS PAIN: 0
CHEST TIGHTNESS: 0

## 2021-09-27 ENCOUNTER — APPOINTMENT (OUTPATIENT)
Dept: CT IMAGING | Age: 41
End: 2021-09-27
Payer: MEDICARE

## 2021-09-27 VITALS
TEMPERATURE: 98.5 F | RESPIRATION RATE: 18 BRPM | DIASTOLIC BLOOD PRESSURE: 78 MMHG | SYSTOLIC BLOOD PRESSURE: 111 MMHG | OXYGEN SATURATION: 97 % | BODY MASS INDEX: 29.71 KG/M2 | HEIGHT: 64 IN | HEART RATE: 76 BPM | WEIGHT: 174 LBS

## 2021-09-27 LAB
BASOPHILIA: ABNORMAL
BASOPHILS # BLD: 0.6 %
BASOPHILS ABSOLUTE: 0.1 THOU/MM3 (ref 0–0.1)
EKG ATRIAL RATE: 90 BPM
EKG P AXIS: 41 DEGREES
EKG P-R INTERVAL: 144 MS
EKG Q-T INTERVAL: 382 MS
EKG QRS DURATION: 88 MS
EKG QTC CALCULATION (BAZETT): 467 MS
EKG R AXIS: 57 DEGREES
EKG T AXIS: 56 DEGREES
EKG VENTRICULAR RATE: 90 BPM
EOSINOPHIL # BLD: 2.9 %
EOSINOPHILS ABSOLUTE: 0.5 THOU/MM3 (ref 0–0.4)
ERYTHROCYTE [DISTWIDTH] IN BLOOD BY AUTOMATED COUNT: 13.3 % (ref 11.5–14.5)
ERYTHROCYTE [DISTWIDTH] IN BLOOD BY AUTOMATED COUNT: 44.7 FL (ref 35–45)
HCT VFR BLD CALC: 39.6 % (ref 37–47)
HEMOGLOBIN: 13.5 GM/DL (ref 12–16)
IMMATURE GRANS (ABS): 0.1 THOU/MM3 (ref 0–0.07)
IMMATURE GRANULOCYTES: 0.6 %
LYMPHOCYTES # BLD: 29.5 %
LYMPHOCYTES ABSOLUTE: 5.1 THOU/MM3 (ref 1–4.8)
MAGNESIUM: 1.1 MG/DL (ref 1.6–2.4)
MCH RBC QN AUTO: 31.1 PG (ref 26–33)
MCHC RBC AUTO-ENTMCNC: 34.1 GM/DL (ref 32.2–35.5)
MCV RBC AUTO: 91.2 FL (ref 81–99)
MONOCYTES # BLD: 5.2 %
MONOCYTES ABSOLUTE: 0.9 THOU/MM3 (ref 0.4–1.3)
NUCLEATED RED BLOOD CELLS: 0 /100 WBC
PATHOLOGIST REVIEW: ABNORMAL
PLATELET # BLD: 244 THOU/MM3 (ref 130–400)
PMV BLD AUTO: 10.4 FL (ref 9.4–12.4)
RBC # BLD: 4.34 MILL/MM3 (ref 4.2–5.4)
SCAN OF BLOOD SMEAR: NORMAL
SEG NEUTROPHILS: 61.2 %
SEGMENTED NEUTROPHILS ABSOLUTE COUNT: 10.6 THOU/MM3 (ref 1.8–7.7)
TARGET CELLS: ABNORMAL
WBC # BLD: 17.4 THOU/MM3 (ref 4.8–10.8)

## 2021-09-27 PROCEDURE — 71275 CT ANGIOGRAPHY CHEST: CPT

## 2021-09-27 PROCEDURE — 6360000004 HC RX CONTRAST MEDICATION: Performed by: EMERGENCY MEDICINE

## 2021-09-27 PROCEDURE — 93010 ELECTROCARDIOGRAM REPORT: CPT | Performed by: NUCLEAR MEDICINE

## 2021-09-27 PROCEDURE — 6360000002 HC RX W HCPCS: Performed by: EMERGENCY MEDICINE

## 2021-09-27 PROCEDURE — 6370000000 HC RX 637 (ALT 250 FOR IP): Performed by: EMERGENCY MEDICINE

## 2021-09-27 RX ORDER — MAGNESIUM SULFATE IN WATER 40 MG/ML
2000 INJECTION, SOLUTION INTRAVENOUS ONCE
Status: COMPLETED | OUTPATIENT
Start: 2021-09-27 | End: 2021-09-27

## 2021-09-27 RX ORDER — MAGNESIUM CHLORIDE 64 MG
1 TABLET, DELAYED RELEASE (ENTERIC COATED) ORAL
Status: DISCONTINUED | OUTPATIENT
Start: 2021-09-27 | End: 2021-09-27 | Stop reason: HOSPADM

## 2021-09-27 RX ORDER — ACETAMINOPHEN 500 MG
1000 TABLET ORAL EVERY 6 HOURS PRN
Qty: 20 TABLET | Refills: 0 | Status: SHIPPED | OUTPATIENT
Start: 2021-09-27

## 2021-09-27 RX ORDER — POTASSIUM CHLORIDE 7.45 MG/ML
10 INJECTION INTRAVENOUS ONCE
Status: COMPLETED | OUTPATIENT
Start: 2021-09-27 | End: 2021-09-27

## 2021-09-27 RX ADMIN — IOPAMIDOL 80 ML: 755 INJECTION, SOLUTION INTRAVENOUS at 00:45

## 2021-09-27 RX ADMIN — POTASSIUM CHLORIDE 40 MEQ: 1500 TABLET, EXTENDED RELEASE ORAL at 00:00

## 2021-09-27 RX ADMIN — MAGNESIUM SULFATE HEPTAHYDRATE 2000 MG: 40 INJECTION, SOLUTION INTRAVENOUS at 01:00

## 2021-09-27 RX ADMIN — POTASSIUM CHLORIDE 10 MEQ: 7.46 INJECTION, SOLUTION INTRAVENOUS at 03:00

## 2021-09-27 ASSESSMENT — PAIN SCALES - GENERAL: PAINLEVEL_OUTOF10: 3

## 2021-09-27 NOTE — ED PROVIDER NOTES
The patient is signed out to me by my evening colleague. Patient is complaining of \"chest pain\". However it is mostly her right shoulder and elbow pain. It radiated to the chest front and back. Patient is worse with movement of her right arm and when coughing. Patient denies any fever chills or sweats. No other acute complaints at this time. Patient was evaluated by my evening colleague. Patient was awaiting read on CTA of chest.  This was read as negative. Patient is subsequently discharged home in stable condition with instructions for follow-up. CTA Chest W WO Contrast   Final Result   Impression:   1. Normal CT angiography of the aorta. 2. Negative for pulmonary embolus      This document has been electronically signed by: Dawn Brar MD on    09/27/2021 03:46 AM      All CTs at this facility use dose modulation techniques and iterative    reconstructions, and/or weight-based dosing   when appropriate to reduce radiation to a low as reasonably achievable. XR CHEST (2 VW)   Final Result   1. No acute findings. This document has been electronically signed by:  Von Rosa MD on 09/27/2021 01:29 AM        Labs Reviewed   CBC WITH AUTO DIFFERENTIAL - Abnormal; Notable for the following components:       Result Value    WBC 17.4 (*)     All other components within normal limits   BASIC METABOLIC PANEL W/ REFLEX TO MG FOR LOW K - Abnormal; Notable for the following components:    Potassium reflex Magnesium 3.0 (*)     Glucose 130 (*)     All other components within normal limits   D-DIMER, QUANTITATIVE - Abnormal; Notable for the following components:    D-Dimer, Quant 518.00 (*)     All other components within normal limits   MAGNESIUM - Abnormal; Notable for the following components:    Magnesium 1.1 (*)     All other components within normal limits   OSMOLALITY - Abnormal; Notable for the following components:    Osmolality Calc 273.8 (*)     All other components within normal limits   TROPONIN   ANION GAP   GLOMERULAR FILTRATION RATE, ESTIMATED   SCAN OF BLOOD SMEAR     I have seen this patient with Dr. Carmela Becerril and agree with his assessment and plan.      Pat Marsh,   09/27/21 8261

## 2021-09-27 NOTE — ED PROVIDER NOTES
rest when talking. Body mass index is 29.87 kg/m². Pulsoximetry is normal per my interpretation. Additional Vital Signs:  Vitals:    09/27/21 0106   BP: (!) 150/96   Pulse: 77   Resp:    Temp:    SpO2:        Physical Exam  Vitals and nursing note reviewed. Constitutional:       General: She is not in acute distress. Appearance: Normal appearance. She is well-developed. HENT:      Head: Normocephalic and atraumatic. Right Ear: External ear normal.      Left Ear: External ear normal.      Nose: Nose normal.      Mouth/Throat:      Mouth: Mucous membranes are moist.   Eyes:      Conjunctiva/sclera: Conjunctivae normal.      Pupils: Pupils are equal, round, and reactive to light. Neck:      Vascular: No JVD. Cardiovascular:      Rate and Rhythm: Normal rate and regular rhythm. Heart sounds: Normal heart sounds. No murmur heard. No friction rub. No gallop. Pulmonary:      Effort: Pulmonary effort is normal.      Breath sounds: Normal breath sounds. No stridor. No wheezing or rales. Abdominal:      General: Abdomen is flat. Tenderness: There is no abdominal tenderness. Musculoskeletal:      Cervical back: Neck supple. Comments: There is tenderness on the right subscapularis area, pain on range of motion of the right shoulder and right elbow. No deformity or erythema. Skin:     General: Skin is warm and dry. Neurological:      Mental Status: She is alert and oriented to person, place, and time. Psychiatric:         Behavior: Behavior normal.             MEDICAL DECISION MAKING   Initial Assessment:   1. Undifferentiated chest pain  2. Rule out ACS  3. Shoulder pain  4.  Rule out PE, PERC not applicable from tachycardia  Plan:    I V line, labs   EKG   Analgesia, patient is allergic to NSAIDs and aspirin   Imaging   Observation        ED RESULTS   Laboratory results:  Labs Reviewed   CBC WITH AUTO DIFFERENTIAL - Abnormal; Notable for the following components: Result Value    WBC 17.4 (*)     All other components within normal limits   BASIC METABOLIC PANEL W/ REFLEX TO MG FOR LOW K - Abnormal; Notable for the following components:    Potassium reflex Magnesium 3.0 (*)     Glucose 130 (*)     All other components within normal limits   D-DIMER, QUANTITATIVE - Abnormal; Notable for the following components:    D-Dimer, Quant 518.00 (*)     All other components within normal limits   MAGNESIUM - Abnormal; Notable for the following components:    Magnesium 1.1 (*)     All other components within normal limits   OSMOLALITY - Abnormal; Notable for the following components:    Osmolality Calc 273.8 (*)     All other components within normal limits   TROPONIN   ANION GAP   GLOMERULAR FILTRATION RATE, ESTIMATED   SCAN OF BLOOD SMEAR       Radiologic studies results:  XR CHEST (2 VW)   Final Result   1. No acute findings. This document has been electronically signed by: Harsha Ochoa MD on 09/27/2021 01:29 AM      CTA Chest W WO Contrast    (Results Pending)             ED COURSE   ED Medications administered this visit:   Medications   magnesium chloride (MAG DELAY) extended release tablet 64 mg (has no administration in time range)   oxyCODONE-acetaminophen (PERCOCET) 5-325 MG per tablet 1 tablet (1 tablet Oral Given 9/26/21 2314)   lactated ringers infusion 1,000 mL (0 mLs IntraVENous Stopped 9/27/21 0144)   potassium chloride (KLOR-CON M) extended release tablet 40 mEq (40 mEq Oral Given 9/27/21 0000)   magnesium sulfate 2000 mg in 50 mL IVPB premix (0 mg IntraVENous Stopped 9/27/21 0301)   potassium chloride 10 mEq/100 mL IVPB (Peripheral Line) (10 mEq IntraVENous New Bag 9/27/21 0300)   iopamidol (ISOVUE-370) 76 % injection 80 mL (80 mLs IntraVENous Given 9/27/21 0045)       ED Course as of Sep 27 0315   Sun Sep 26, 2021   2350 D-dimer minimally elevated with significant leukocytosis with unclear source. We will proceed to do pulmonary CT angiogram. [DT]   Mon Sep 27, 2021   0009 Magnesium(!): 1.1 [DT]   0009 Potassium(!): 3.0 [DT]   0250 Patient pain-free at this moment, feels comfortable going home if CT scan is within acceptable limits. We will wait for report for likely discharge home. [DT]      ED Course User Index  [DT] Kwesi Rob MD     Strict return precautions and follow up instructions were discussed with the patient prior to discharge, with which the patient agrees. MEDICATION CHANGES     New Prescriptions    ACETAMINOPHEN (TYLENOL) 500 MG TABLET    Take 2 tablets by mouth every 6 hours as needed for Pain         FINAL DISPOSITION     Final diagnoses:   Hypokalemia   Hypomagnesemia   Other chest pain   Leukocytosis, unspecified type     Condition: condition: good  Dispo: Discharge to home after CT scan result. Patient signed out to Dr. Shemar Bernstein at [facility].      This transcription was electronically signed. It was dictated by use of voice recognition software and electronically transcribed. The transcription may contain errors not detected in proofreading.        Kwesi Rob MD  09/27/21 9232

## 2021-10-17 ENCOUNTER — HOSPITAL ENCOUNTER (EMERGENCY)
Age: 41
Discharge: HOME | End: 2021-10-18
Attending: EMERGENCY MEDICINE
Payer: MEDICARE

## 2021-10-17 ENCOUNTER — APPOINTMENT (OUTPATIENT)
Dept: GENERAL RADIOLOGY | Age: 41
End: 2021-10-17
Payer: MEDICARE

## 2021-10-17 DIAGNOSIS — Z20.822 SUSPECTED COVID-19 VIRUS INFECTION: Primary | ICD-10-CM

## 2021-10-17 PROCEDURE — 96374 THER/PROPH/DIAG INJ IV PUSH: CPT

## 2021-10-17 PROCEDURE — 80053 COMPREHEN METABOLIC PANEL: CPT

## 2021-10-17 PROCEDURE — 83690 ASSAY OF LIPASE: CPT

## 2021-10-17 PROCEDURE — 87804 INFLUENZA ASSAY W/OPTIC: CPT

## 2021-10-17 PROCEDURE — 99284 EMERGENCY DEPT VISIT MOD MDM: CPT

## 2021-10-17 PROCEDURE — 87635 SARS-COV-2 COVID-19 AMP PRB: CPT

## 2021-10-17 PROCEDURE — 82248 BILIRUBIN DIRECT: CPT

## 2021-10-17 PROCEDURE — 85025 COMPLETE CBC W/AUTO DIFF WBC: CPT

## 2021-10-17 PROCEDURE — 84484 ASSAY OF TROPONIN QUANT: CPT

## 2021-10-17 PROCEDURE — 36415 COLL VENOUS BLD VENIPUNCTURE: CPT

## 2021-10-17 PROCEDURE — 83735 ASSAY OF MAGNESIUM: CPT

## 2021-10-17 PROCEDURE — 84443 ASSAY THYROID STIM HORMONE: CPT

## 2021-10-17 PROCEDURE — 83880 ASSAY OF NATRIURETIC PEPTIDE: CPT

## 2021-10-17 PROCEDURE — 93005 ELECTROCARDIOGRAM TRACING: CPT | Performed by: EMERGENCY MEDICINE

## 2021-10-17 PROCEDURE — 71045 X-RAY EXAM CHEST 1 VIEW: CPT

## 2021-10-17 RX ORDER — ACETAMINOPHEN 325 MG/1
650 TABLET ORAL ONCE
Status: COMPLETED | OUTPATIENT
Start: 2021-10-18 | End: 2021-10-18

## 2021-10-17 ASSESSMENT — ENCOUNTER SYMPTOMS
RHINORRHEA: 0
WHEEZING: 0
NAUSEA: 0
COUGH: 1
SORE THROAT: 0
BACK PAIN: 0
CONSTIPATION: 0
VOMITING: 0
ABDOMINAL PAIN: 0
DIARRHEA: 0
BLOOD IN STOOL: 0
ABDOMINAL DISTENTION: 0
SHORTNESS OF BREATH: 1
EYE DISCHARGE: 0
EYE ITCHING: 0
CHOKING: 0
TROUBLE SWALLOWING: 0
CHEST TIGHTNESS: 0
PHOTOPHOBIA: 0
SINUS PRESSURE: 0
EYE PAIN: 0
VOICE CHANGE: 0
EYE REDNESS: 0

## 2021-10-17 ASSESSMENT — PAIN DESCRIPTION - LOCATION: LOCATION: HEAD

## 2021-10-17 ASSESSMENT — PAIN DESCRIPTION - PAIN TYPE: TYPE: ACUTE PAIN

## 2021-10-17 ASSESSMENT — PAIN DESCRIPTION - DESCRIPTORS: DESCRIPTORS: ACHING

## 2021-10-17 ASSESSMENT — PAIN SCALES - GENERAL: PAINLEVEL_OUTOF10: 8

## 2021-10-17 NOTE — Clinical Note
Gudelia Wiley was seen and treated in our emergency department on 10/17/2021. She may return to work on 10/22/2021. If you have any questions or concerns, please don't hesitate to call.       Thomas Dobson, DO

## 2021-10-18 VITALS
BODY MASS INDEX: 29.71 KG/M2 | RESPIRATION RATE: 17 BRPM | WEIGHT: 174 LBS | TEMPERATURE: 98.1 F | HEIGHT: 64 IN | OXYGEN SATURATION: 97 % | SYSTOLIC BLOOD PRESSURE: 113 MMHG | DIASTOLIC BLOOD PRESSURE: 91 MMHG | HEART RATE: 82 BPM

## 2021-10-18 LAB
ALBUMIN SERPL-MCNC: 4.1 G/DL (ref 3.5–5.1)
ALP BLD-CCNC: 92 U/L (ref 38–126)
ALT SERPL-CCNC: 19 U/L (ref 11–66)
ANION GAP SERPL CALCULATED.3IONS-SCNC: 14 MEQ/L (ref 8–16)
AST SERPL-CCNC: 24 U/L (ref 5–40)
BASOPHILS # BLD: 0.7 %
BASOPHILS ABSOLUTE: 0.1 THOU/MM3 (ref 0–0.1)
BILIRUB SERPL-MCNC: 0.2 MG/DL (ref 0.3–1.2)
BILIRUBIN DIRECT: < 0.2 MG/DL (ref 0–0.3)
BUN BLDV-MCNC: 9 MG/DL (ref 7–22)
CALCIUM SERPL-MCNC: 9.2 MG/DL (ref 8.5–10.5)
CHLORIDE BLD-SCNC: 101 MEQ/L (ref 98–111)
CO2: 23 MEQ/L (ref 23–33)
CREAT SERPL-MCNC: 0.8 MG/DL (ref 0.4–1.2)
EKG ATRIAL RATE: 103 BPM
EKG P AXIS: 36 DEGREES
EKG P-R INTERVAL: 148 MS
EKG Q-T INTERVAL: 364 MS
EKG QRS DURATION: 86 MS
EKG QTC CALCULATION (BAZETT): 476 MS
EKG R AXIS: 23 DEGREES
EKG T AXIS: 27 DEGREES
EKG VENTRICULAR RATE: 103 BPM
EOSINOPHIL # BLD: 3.5 %
EOSINOPHILS ABSOLUTE: 0.6 THOU/MM3 (ref 0–0.4)
ERYTHROCYTE [DISTWIDTH] IN BLOOD BY AUTOMATED COUNT: 13.4 % (ref 11.5–14.5)
ERYTHROCYTE [DISTWIDTH] IN BLOOD BY AUTOMATED COUNT: 43.8 FL (ref 35–45)
FLU A ANTIGEN: NEGATIVE
FLU B ANTIGEN: NEGATIVE
GFR SERPL CREATININE-BSD FRML MDRD: 79 ML/MIN/1.73M2
GLUCOSE BLD-MCNC: 151 MG/DL (ref 70–108)
HCT VFR BLD CALC: 39.6 % (ref 37–47)
HEMOGLOBIN: 13.5 GM/DL (ref 12–16)
IMMATURE GRANS (ABS): 0.06 THOU/MM3 (ref 0–0.07)
IMMATURE GRANULOCYTES: 0.4 %
LIPASE: 31.7 U/L (ref 5.6–51.3)
LYMPHOCYTES # BLD: 29.2 %
LYMPHOCYTES ABSOLUTE: 4.8 THOU/MM3 (ref 1–4.8)
MAGNESIUM: 1.3 MG/DL (ref 1.6–2.4)
MCH RBC QN AUTO: 30.7 PG (ref 26–33)
MCHC RBC AUTO-ENTMCNC: 34.1 GM/DL (ref 32.2–35.5)
MCV RBC AUTO: 90 FL (ref 81–99)
MONOCYTES # BLD: 5.5 %
MONOCYTES ABSOLUTE: 0.9 THOU/MM3 (ref 0.4–1.3)
NUCLEATED RED BLOOD CELLS: 0 /100 WBC
OSMOLALITY CALCULATION: 277.3 MOSMOL/KG (ref 275–300)
PLATELET # BLD: 248 THOU/MM3 (ref 130–400)
PMV BLD AUTO: 10.7 FL (ref 9.4–12.4)
POTASSIUM SERPL-SCNC: 3.6 MEQ/L (ref 3.5–5.2)
PRO-BNP: 14.3 PG/ML (ref 0–450)
RBC # BLD: 4.4 MILL/MM3 (ref 4.2–5.4)
SARS-COV-2, NAAT: NOT  DETECTED
SEG NEUTROPHILS: 60.7 %
SEGMENTED NEUTROPHILS ABSOLUTE COUNT: 10 THOU/MM3 (ref 1.8–7.7)
SODIUM BLD-SCNC: 138 MEQ/L (ref 135–145)
TOTAL PROTEIN: 7.3 G/DL (ref 6.1–8)
TROPONIN T: < 0.01 NG/ML
TSH SERPL DL<=0.05 MIU/L-ACNC: 1.48 UIU/ML (ref 0.4–4.2)
WBC # BLD: 16.5 THOU/MM3 (ref 4.8–10.8)

## 2021-10-18 PROCEDURE — 93010 ELECTROCARDIOGRAM REPORT: CPT | Performed by: INTERNAL MEDICINE

## 2021-10-18 PROCEDURE — 6360000002 HC RX W HCPCS: Performed by: EMERGENCY MEDICINE

## 2021-10-18 PROCEDURE — 6370000000 HC RX 637 (ALT 250 FOR IP): Performed by: EMERGENCY MEDICINE

## 2021-10-18 PROCEDURE — 6370000000 HC RX 637 (ALT 250 FOR IP)

## 2021-10-18 RX ORDER — AZITHROMYCIN 500 MG/1
500 TABLET, FILM COATED ORAL DAILY
Qty: 5 TABLET | Refills: 0 | Status: SHIPPED | OUTPATIENT
Start: 2021-10-18 | End: 2021-10-23

## 2021-10-18 RX ORDER — ALBUTEROL SULFATE 90 UG/1
AEROSOL, METERED RESPIRATORY (INHALATION)
Status: COMPLETED
Start: 2021-10-18 | End: 2021-10-18

## 2021-10-18 RX ORDER — DEXAMETHASONE SODIUM PHOSPHATE 4 MG/ML
6 INJECTION, SOLUTION INTRA-ARTICULAR; INTRALESIONAL; INTRAMUSCULAR; INTRAVENOUS; SOFT TISSUE ONCE
Status: COMPLETED | OUTPATIENT
Start: 2021-10-18 | End: 2021-10-18

## 2021-10-18 RX ORDER — ALBUTEROL SULFATE 90 UG/1
2 AEROSOL, METERED RESPIRATORY (INHALATION) EVERY 4 HOURS PRN
Status: DISCONTINUED | OUTPATIENT
Start: 2021-10-18 | End: 2021-10-18 | Stop reason: HOSPADM

## 2021-10-18 RX ORDER — AZITHROMYCIN 250 MG/1
500 TABLET, FILM COATED ORAL ONCE
Status: COMPLETED | OUTPATIENT
Start: 2021-10-18 | End: 2021-10-18

## 2021-10-18 RX ORDER — DEXAMETHASONE 6 MG/1
6 TABLET ORAL 2 TIMES DAILY WITH MEALS
Qty: 20 TABLET | Refills: 0 | Status: SHIPPED | OUTPATIENT
Start: 2021-10-18 | End: 2021-10-28

## 2021-10-18 RX ADMIN — DEXAMETHASONE SODIUM PHOSPHATE 6 MG: 4 INJECTION, SOLUTION INTRA-ARTICULAR; INTRALESIONAL; INTRAMUSCULAR; INTRAVENOUS; SOFT TISSUE at 01:16

## 2021-10-18 RX ADMIN — ALBUTEROL SULFATE 2 PUFF: 90 AEROSOL, METERED RESPIRATORY (INHALATION) at 01:18

## 2021-10-18 RX ADMIN — AZITHROMYCIN 500 MG: 250 TABLET, FILM COATED ORAL at 01:18

## 2021-10-18 RX ADMIN — ACETAMINOPHEN 650 MG: 325 TABLET ORAL at 00:07

## 2021-10-18 ASSESSMENT — PAIN SCALES - GENERAL
PAINLEVEL_OUTOF10: 8
PAINLEVEL_OUTOF10: 2

## 2021-10-18 ASSESSMENT — PAIN DESCRIPTION - LOCATION: LOCATION: HEAD

## 2021-10-18 ASSESSMENT — PAIN DESCRIPTION - PAIN TYPE: TYPE: ACUTE PAIN

## 2021-10-18 NOTE — ED NOTES
RN started pt on fluid bolus . 9% per verbal order by  [facility] at this time. Pt states she needs to use the restroom. RN showed pt where the restroom was located. Pt ambulated to restroom at this time.       Washington Naik RN  10/18/21 0010

## 2021-10-18 NOTE — ED PROVIDER NOTES
[facility]  eMERGENCY dEPARTMENT eNCOUnter          CHIEF COMPLAINT       Chief Complaint   Patient presents with    Shortness of Breath    Headache       Nurses Notes reviewed and I agree except as noted in the HPI. HISTORY OF PRESENT ILLNESS    April D Rodrick Perez is a 37 y.o. female who presents fatigue, shortness of breath, headaches. Patient states she was working with a coworker who just recently tested positive for Covid. Patient states that she is not having any overwhelming shortness of breath however she does have asthma and she does smoke. Patient denies any fevers chills sweats. She denies any dizziness no loss of taste or smell. Patient has had no overt chest pain abdominal pain. She has some nausea and one bout of vomiting. No diarrhea. Currently the patient is resting comfortably on the cot no apparent distress no other physical complaints at this time. REVIEW OF SYSTEMS     Review of Systems   Constitutional: Negative for activity change, appetite change, diaphoresis, fatigue and unexpected weight change. HENT: Negative for congestion, ear discharge, ear pain, hearing loss, rhinorrhea, sinus pressure, sore throat, trouble swallowing and voice change. Eyes: Negative for photophobia, pain, discharge, redness and itching. Respiratory: Positive for cough and shortness of breath. Negative for choking, chest tightness and wheezing. Cardiovascular: Negative for chest pain, palpitations and leg swelling. Gastrointestinal: Negative for abdominal distention, abdominal pain, blood in stool, constipation, diarrhea, nausea and vomiting. Endocrine: Negative for polydipsia, polyphagia and polyuria. Genitourinary: Negative for decreased urine volume, difficulty urinating, dysuria, enuresis, frequency, hematuria and urgency. Musculoskeletal: Negative for arthralgias, back pain, gait problem, myalgias, neck pain and neck stiffness. Skin: Negative for pallor and rash. Allergic/Immunologic: Negative for immunocompromised state. Neurological: Negative for dizziness, tremors, seizures, syncope, facial asymmetry, weakness, light-headedness, numbness and headaches. Hematological: Negative for adenopathy. Does not bruise/bleed easily. Psychiatric/Behavioral: Negative for agitation, hallucinations and suicidal ideas. The patient is not nervous/anxious. PAST MEDICAL HISTORY    has a past medical history of Asthma and MDRO (multiple drug resistant organisms) resistance. SURGICAL HISTORY      has a past surgical history that includes Dilation and curettage of uterus; Appendectomy; Tubal ligation; Endometrial ablation (08/13/14); and Hysterectomy. CURRENT MEDICATIONS       Discharge Medication List as of 10/18/2021  1:11 AM      CONTINUE these medications which have NOT CHANGED    Details   !! acetaminophen (TYLENOL) 500 MG tablet Take 2 tablets by mouth every 6 hours as needed for Pain, Disp-20 tablet, R-0Normal      !! acetaminophen (TYLENOL) 500 MG tablet Take 1 tablet by mouth 4 times daily as needed for Pain, Disp-360 tablet, R-1Normal      ibuprofen (ADVIL;MOTRIN) 200 MG tablet Take 400 mg by mouth every 6 hours as needed for PainHistorical Med       !! - Potential duplicate medications found. Please discuss with provider. ALLERGIES     is allergic to aspirin. FAMILY HISTORY     She indicated that the status of her mother is unknown. She indicated that the status of her father is unknown. She indicated that the status of her maternal grandmother is unknown.  She indicated that the status of her maternal grandfather is unknown.   family history includes Asthma in her father, maternal grandmother, and mother; Cancer in her maternal grandmother; Diabetes in her maternal grandfather; Heart Disease in her maternal grandmother; High Blood Pressure in her maternal grandmother; Kidney Disease in her maternal grandfather; Lung Cancer (age of onset: 79) in her maternal grandmother. SOCIAL HISTORY      reports that she has been smoking cigarettes. She has a 10.00 pack-year smoking history. She has never used smokeless tobacco. She reports that she does not drink alcohol and does not use drugs. PHYSICAL EXAM     INITIAL VITALS:  height is 5' 4\" (1.626 m) and weight is 174 lb (78.9 kg). Her oral temperature is 98.1 °F (36.7 °C). Her blood pressure is 113/91 (abnormal) and her pulse is 82. Her respiration is 17 and oxygen saturation is 97%. Physical Exam  Vitals and nursing note reviewed. Constitutional:       General: She is not in acute distress. Appearance: She is well-developed. She is not diaphoretic. HENT:      Head: Normocephalic and atraumatic. Right Ear: External ear normal.      Left Ear: External ear normal.      Nose: Nose normal.      Mouth/Throat:      Pharynx: No oropharyngeal exudate. Eyes:      General: No scleral icterus. Right eye: No discharge. Left eye: No discharge. Conjunctiva/sclera: Conjunctivae normal.      Pupils: Pupils are equal, round, and reactive to light. Neck:      Thyroid: No thyromegaly. Vascular: No JVD. Trachea: No tracheal deviation. Cardiovascular:      Rate and Rhythm: Normal rate and regular rhythm. Heart sounds: Normal heart sounds, S1 normal and S2 normal. No murmur heard. No friction rub. No gallop. Pulmonary:      Effort: Pulmonary effort is normal.      Breath sounds: No stridor. Examination of the right-upper field reveals wheezing. Examination of the left-upper field reveals wheezing. Examination of the right-middle field reveals wheezing. Examination of the left-middle field reveals wheezing. Examination of the right-lower field reveals decreased breath sounds. Examination of the left-lower field reveals decreased breath sounds. Decreased breath sounds and wheezing present. No rhonchi or rales. Chest:      Chest wall: No tenderness.    Abdominal: General: Bowel sounds are normal. There is no distension. Palpations: Abdomen is soft. There is no mass. Tenderness: There is no abdominal tenderness. There is no guarding or rebound. Hernia: No hernia is present. Musculoskeletal:         General: No tenderness. Normal range of motion. Cervical back: Normal range of motion and neck supple. Lymphadenopathy:      Cervical: No cervical adenopathy. Skin:     General: Skin is warm and dry. Capillary Refill: Capillary refill takes less than 2 seconds. Findings: No bruising, ecchymosis, lesion or rash. Neurological:      Mental Status: She is alert and oriented to person, place, and time. Cranial Nerves: No cranial nerve deficit. Coordination: Coordination normal.      Deep Tendon Reflexes: Reflexes are normal and symmetric. Psychiatric:         Speech: Speech normal.         Behavior: Behavior normal.         Thought Content: Thought content normal.         Judgment: Judgment normal.           DIFFERENTIAL DIAGNOSIS:   Differential diagnosis discussed extensively at bedside with patient including but not limited to influenza COVID-19, cold pneumonia bronchitis    DIAGNOSTIC RESULTS     EKG: All EKG's are interpreted by the Emergency Department Physician who either signs or Co-signs this chart in the absence of a cardiologist.  EKG revealed sinus tachycardia normal axis, ventricular rate 103 WV interval 148 QRS duration of 86 QT interval 364 QTC of 476. RADIOLOGY: non-plain film images(s) such as CT, Ultrasound and MRI are read by the radiologist.  XR CHEST PORTABLE   Final Result   1. Minimal infiltrates are seen at the lung bases which may be related to minimal atelectasis or pneumonia. **This report has been created using voice recognition software. It may contain minor errors which are inherent in voice recognition technology. **      Final report electronically signed by Dr. Danie Jiang on 10/18/2021 12:10 AM          LABS:   Labs Reviewed   CBC WITH AUTO DIFFERENTIAL - Abnormal; Notable for the following components:       Result Value    WBC 16.5 (*)     Segs Absolute 10.0 (*)     Eosinophils Absolute 0.6 (*)     All other components within normal limits   BASIC METABOLIC PANEL - Abnormal; Notable for the following components:    Glucose 151 (*)     All other components within normal limits   HEPATIC FUNCTION PANEL - Abnormal; Notable for the following components: Total Bilirubin 0.2 (*)     All other components within normal limits   MAGNESIUM - Abnormal; Notable for the following components:    Magnesium 1.3 (*)     All other components within normal limits   GLOMERULAR FILTRATION RATE, ESTIMATED - Abnormal; Notable for the following components:    Est, Glom Filt Rate 79 (*)     All other components within normal limits   COVID-19, RAPID   RAPID INFLUENZA A/B ANTIGENS   BRAIN NATRIURETIC PEPTIDE   LIPASE   TROPONIN   TSH WITHOUT REFLEX   ANION GAP   OSMOLALITY       EMERGENCY DEPARTMENT COURSE:   Vitals:    Vitals:    10/17/21 2321 10/18/21 0007 10/18/21 0058 10/18/21 0116   BP: (!) 134/99 114/81 121/81 (!) 113/91   Pulse: 104 87 75 82   Resp: 15 16 15 17   Temp: 98.1 °F (36.7 °C)      TempSrc: Oral      SpO2: 98% 97% 97% 97%   Weight: 174 lb (78.9 kg)      Height: 5' 4\" (1.626 m)        Patient was assessed at bedside appropriate labs and imaging were ordered. Here today I reviewed all labs and imaging. Patient has what appears to be a mild pneumonia. I suspect that this is Covid pneumonia. She has been given an order to get a repeat test in 3 days. Patient has been given breathing treatments antibiotics steroids for at home. She is instructed to use over-the-counter cough and congestion medication Tylenol Motrin for any pain or fevers.   She is instructed to stay isolated until she has her second Covid test.  She is instructed return to the nearest emergency room immediately for any new or worsening complaints. Patient understood and agreed with the plan. Patient is subsequently discharged home in stable condition. Patient has what appears to be a pneumonia. It is suspected to be Covid pneumonia. Patient has been given an albuterol inhaler here she is instructed to use it as prescribed. She has been given prescriptions for Pulmicort Decadron and azithromycin she is instructed to take that as prescribed. She is instructed take the supplements vitamin C, vitamin D, and zinc.  She is instructed to follow-up with her primary care physician to do so within the next 1 to 2 days. She is instructed return to the nearest emergency room immediately for any new or worsening complaints. CRITICAL CARE:   None    CONSULTS:  None    PROCEDURES:  None    FINAL IMPRESSION      1. Suspected COVID-19 virus infection          DISPOSITION/PLAN   Discharge    PATIENT REFERRED TO:  No follow-up provider specified.     DISCHARGE MEDICATIONS:  Discharge Medication List as of 10/18/2021  1:11 AM      START taking these medications    Details   budesonide (PULMICORT FLEXHALER) 180 MCG/ACT AEPB inhaler Inhale 2 puffs into the lungs 2 times daily, Disp-1 each, R-0Print      dexamethasone (DECADRON) 6 MG tablet Take 1 tablet by mouth 2 times daily (with meals) for 10 days, Disp-20 tablet, R-0Print      azithromycin (ZITHROMAX) 500 MG tablet Take 1 tablet by mouth daily for 5 days, Disp-5 tablet, R-0Print             (Please note that portions of this note were completed with a voice recognition program.  Efforts were made to edit the dictations but occasionally words are mis-transcribed.)    Carrillo Francisco, [address] [facility], DO  10/18/21 7439

## 2021-10-18 NOTE — ED TRIAGE NOTES
Pt presents to the ED from home with complaints of feeling short of breath and a headache. Pt states she has been around a friend who has COVID, however pt has not been tested. Pt states these symptoms began this morning. Pt rates her headache, 8/10 pain. Pt also states she has a cough, and sore throat. Pt is sating at 98%  on room air Pt is alert and oriented x4, with respirations even and unlabored.

## 2021-10-18 NOTE — ED NOTES
Pt resting in cot with eyes closed, respirations even and unlabored. Pt states her pain is \"much better\" and has improved. Call light is within reach. Will continue to monitor.       Kareem Pittman RN  10/18/21 4179

## 2021-10-20 ENCOUNTER — HOSPITAL ENCOUNTER (OUTPATIENT)
Age: 41
Discharge: HOME | End: 2021-10-20
Payer: MEDICARE

## 2021-10-20 LAB
INFLUENZA A: NOT DETECTED
INFLUENZA B: NOT DETECTED
SARS-COV-2 RNA, RT PCR: NOT DETECTED

## 2021-10-20 PROCEDURE — 87636 SARSCOV2 & INF A&B AMP PRB: CPT

## 2022-01-24 ENCOUNTER — HOSPITAL ENCOUNTER (EMERGENCY)
Age: 42
Discharge: HOME | End: 2022-01-24
Payer: MEDICARE

## 2022-01-24 VITALS
TEMPERATURE: 99.9 F | RESPIRATION RATE: 18 BRPM | OXYGEN SATURATION: 97 % | DIASTOLIC BLOOD PRESSURE: 87 MMHG | HEART RATE: 115 BPM | SYSTOLIC BLOOD PRESSURE: 127 MMHG

## 2022-01-24 DIAGNOSIS — G43.709 CHRONIC MIGRAINE WITHOUT AURA WITHOUT STATUS MIGRAINOSUS, NOT INTRACTABLE: ICD-10-CM

## 2022-01-24 DIAGNOSIS — R52 GENERALIZED BODY ACHES: Primary | ICD-10-CM

## 2022-01-24 DIAGNOSIS — B34.9 VIRAL ILLNESS: ICD-10-CM

## 2022-01-24 LAB
FLU A ANTIGEN: NEGATIVE
FLU B ANTIGEN: NEGATIVE
SARS-COV-2, NAAT: NOT  DETECTED

## 2022-01-24 PROCEDURE — 96372 THER/PROPH/DIAG INJ SC/IM: CPT

## 2022-01-24 PROCEDURE — 6360000002 HC RX W HCPCS: Performed by: NURSE PRACTITIONER

## 2022-01-24 PROCEDURE — 87804 INFLUENZA ASSAY W/OPTIC: CPT

## 2022-01-24 PROCEDURE — 87635 SARS-COV-2 COVID-19 AMP PRB: CPT

## 2022-01-24 PROCEDURE — 99282 EMERGENCY DEPT VISIT SF MDM: CPT

## 2022-01-24 RX ORDER — PROCHLORPERAZINE EDISYLATE 5 MG/ML
10 INJECTION INTRAMUSCULAR; INTRAVENOUS ONCE
Status: COMPLETED | OUTPATIENT
Start: 2022-01-24 | End: 2022-01-24

## 2022-01-24 RX ORDER — DIPHENHYDRAMINE HYDROCHLORIDE 50 MG/ML
25 INJECTION INTRAMUSCULAR; INTRAVENOUS ONCE
Status: COMPLETED | OUTPATIENT
Start: 2022-01-24 | End: 2022-01-24

## 2022-01-24 RX ADMIN — DIPHENHYDRAMINE HYDROCHLORIDE 25 MG: 50 INJECTION INTRAMUSCULAR; INTRAVENOUS at 21:07

## 2022-01-24 RX ADMIN — PROCHLORPERAZINE EDISYLATE 10 MG: 5 INJECTION INTRAMUSCULAR; INTRAVENOUS at 21:07

## 2022-01-24 ASSESSMENT — ENCOUNTER SYMPTOMS
CHEST TIGHTNESS: 0
ABDOMINAL PAIN: 1
VOMITING: 1
NAUSEA: 1
RHINORRHEA: 0
COUGH: 0
CONSTIPATION: 0
SHORTNESS OF BREATH: 0
DIARRHEA: 1
SORE THROAT: 0
PHOTOPHOBIA: 1

## 2022-01-24 NOTE — Clinical Note
Skye Louise was seen and treated in our emergency department on 1/24/2022. She may return to work on 01/26/2022. If you have any questions or concerns, please don't hesitate to call.       Tacos Navarro, DREA - CNP

## 2022-01-25 NOTE — ED PROVIDER NOTES
[facility] Emergency Department    CHIEF COMPLAINT       Chief Complaint   Patient presents with    Generalized Body Aches    Headache       Nurses Notes reviewed and I agree except as noted in the HPI. HISTORY OF PRESENT ILLNESS    April D Alan Fernandez odalis 37 y.o. female who presents to the ED for evaluation of body aches and headache that started this morning suddenly. Patient describes her headache as a dull 5/10 pain. She says it is similar to migraine headaches she has had in the past. She complains of photophobia and phonophobia with the headache. Patient says she had a fever of 102 F this morning and took tylenol. She has associated abdominal pain in the epigastric area and describes it as 5/10 constant dull pain. Patient also has nausea, vomiting, chills, and diarrhea. She denies cough, shortness of breath, chest pain, urinary frequency, dysuria, and constipation. She is concerned she may have Covid due to possible exposure at work. HPI was provided by the patient    REVIEW OF SYSTEMS     Review of Systems   Constitutional: Positive for chills, fatigue and fever. HENT: Negative for congestion, ear discharge, ear pain, postnasal drip, rhinorrhea and sore throat. Eyes: Positive for photophobia. Negative for redness. Respiratory: Negative for cough, chest tightness and shortness of breath. Cardiovascular: Negative for chest pain and leg swelling. Gastrointestinal: Positive for abdominal pain, diarrhea, nausea and vomiting. Negative for constipation. Genitourinary: Negative for difficulty urinating, dysuria, enuresis, flank pain, frequency and hematuria. Musculoskeletal: Negative for back pain and joint swelling. Skin: Negative for rash. Neurological: Positive for headaches. Negative for dizziness, light-headedness and numbness. Psychiatric/Behavioral: Negative for agitation, behavioral problems and confusion. All other systems negative except as noted.       PAST MEDICAL HISTORY     Past Medical History:   Diagnosis Date    Asthma     MDRO (multiple drug resistant organisms) resistance 2013    left great toe       SURGICALHISTORY      has a past surgical history that includes Dilation and curettage of uterus; Appendectomy; Tubal ligation; Endometrial ablation (08/13/14); and Hysterectomy. CURRENT MEDICATIONS       Discharge Medication List as of 1/24/2022  9:48 PM      CONTINUE these medications which have NOT CHANGED    Details   budesonide (PULMICORT FLEXHALER) 180 MCG/ACT AEPB inhaler Inhale 2 puffs into the lungs 2 times daily, Disp-1 each, R-0Print      !! acetaminophen (TYLENOL) 500 MG tablet Take 2 tablets by mouth every 6 hours as needed for Pain, Disp-20 tablet, R-0Normal      !! acetaminophen (TYLENOL) 500 MG tablet Take 1 tablet by mouth 4 times daily as needed for Pain, Disp-360 tablet, R-1Normal      ibuprofen (ADVIL;MOTRIN) 200 MG tablet Take 400 mg by mouth every 6 hours as needed for PainHistorical Med       !! - Potential duplicate medications found. Please discuss with provider. ALLERGIES     is allergic to aspirin. FAMILY HISTORY     She indicated that the status of her mother is unknown. She indicated that the status of her father is unknown. She indicated that the status of her maternal grandmother is unknown. She indicated that the status of her maternal grandfather is unknown.   family history includes Asthma in her father, maternal grandmother, and mother; Cancer in her maternal grandmother; Diabetes in her maternal grandfather; Heart Disease in her maternal grandmother; High Blood Pressure in her maternal grandmother; Kidney Disease in her maternal grandfather; Lung Cancer (age of onset: 79) in her maternal grandmother.     SOCIAL HISTORY       Social History     Socioeconomic History    Marital status:      Spouse name: Not on file    Number of children: Not on file    Years of education: Not on file    Highest education level: General: She is not in acute distress. Appearance: She is well-developed. She is not diaphoretic. HENT:      Head: Normocephalic and atraumatic. Nose: Nose normal.      Mouth/Throat:      Mouth: Mucous membranes are moist.      Pharynx: Oropharynx is clear. Eyes:      General:         Right eye: No discharge. Left eye: No discharge. Extraocular Movements: Extraocular movements intact. Conjunctiva/sclera: Conjunctivae normal.   Neck:      Trachea: No tracheal deviation. Comments: Patient has no nuchal rigidity or meningeal signs. Cardiovascular:      Rate and Rhythm: Normal rate and regular rhythm. Pulses: Normal pulses. Heart sounds: Normal heart sounds. No murmur heard. No gallop. Comments: Normal capillary refill  Pulmonary:      Effort: Pulmonary effort is normal. No respiratory distress. Breath sounds: Normal breath sounds. No stridor. Abdominal:      General: Bowel sounds are normal.      Palpations: Abdomen is soft. Musculoskeletal:         General: No tenderness or deformity. Normal range of motion. Cervical back: Normal range of motion. No rigidity or tenderness. Skin:     General: Skin is warm and dry. Capillary Refill: Capillary refill takes less than 2 seconds. Coloration: Skin is not pale. Findings: No erythema or rash. Neurological:      General: No focal deficit present. Mental Status: She is alert and oriented to person, place, and time. Cranial Nerves: No cranial nerve deficit.    Psychiatric:         Mood and Affect: Mood normal.         Behavior: Behavior normal.         DIFFERENTIAL DIAGNOSIS:   flu, strep, PNA, bronchitis, viral illness      DIAGNOSTIC RESULTS     EKG: All EKG's are interpreted by the Emergency Department Physician who eithersigns or Co-signs this chart in the absence of a cardiologist.        RADIOLOGY: non-plainfilm images(s) such as CT, Ultrasound and MRI are read by the radiologist.  Jessee Milian radiographic images are visualized and preliminarily interpreted by the emergency physician unless otherwise stated below. No orders to display         LABS:   Labs Reviewed   COVID-19, RAPID   RAPID INFLUENZA A/B ANTIGENS       EMERGENCY DEPARTMENT COURSE:   Vitals:    Vitals:    01/24/22 2011   BP: 127/87   Pulse: 115   Resp: 18   Temp: 99.9 °F (37.7 °C)   TempSrc: Oral   SpO2: 97%                              Internal Administration   First Dose      Second Dose           Last COVID Lab SARS-CoV-2 (no units)   Date Value   11/30/2020 Not Detected     SARS-CoV-2 RNA, RT PCR (no units)   Date Value   10/20/2021 NOT DETECTED     SARS-CoV-2, NAAT (no units)   Date Value   01/24/2022 NOT  DETECTED            MDM      Patient was seen in the ER for headache, fatigue, body aches. Appropriate testing is ordered and reviewed. She is treated with benadryl and compazine with improvement. Reviewed findings with patient. DC home with close follow up and return precautions. Medications   prochlorperazine (COMPAZINE) injection 10 mg (10 mg IntraMUSCular Given 1/24/22 2107)   diphenhydrAMINE (BENADRYL) injection 25 mg (25 mg IntraMUSCular Given 1/24/22 2107)       Please note that the patient was evaluated during a pandemic. All efforts were made for HIPPA compliance as well as provision of appropriate care. Patient was seen independently by myself. The patient's final impression and disposition and plan was determined by myself. Strict return precautions and follow up instructions were discussed with the patient prior to discharge, with which the patient agrees. Physical assessment findings, diagnostic testing(s) if applicable, and vital signs reviewed with patient/patient representative. Questions answered. Medications asdirected, including OTC medications for supportive care. Education provided on medications.   Differential diagnosis(s) discussed with patient/patient representative. Home care/self care instructions reviewed withpatient/patient representative. Patient is to follow-up with family care provider in 2-3 days if no improvement. Patient is to go to the emergency department if symptoms worsen. Patient/patient representative isaware of care plan, questions answered, verbalizes understanding and is in agreement. CRITICAL CARE:   None    CONSULTS:  None    PROCEDURES:  None    FINAL IMPRESSION     1. Generalized body aches    2. Viral illness    3.  Chronic migraine without aura without status migrainosus, not intractable          DISPOSITION/PLAN   DISPOSITION Decision To Discharge 01/24/2022 09:47:13 PM      PATIENT REFERREDTO:  DREA Ragsdale CNPtsche[address]pined[address]  [phone]    Schedule an appointment as soon as possible for a visit in 2 days  For follow up      DISCHARGE MEDICATIONS:  Discharge Medication List as of 1/24/2022  9:48 PM          (Please note that portions of this note were completed with a voice recognition program.  Efforts were made to edit the dictations but occasionally words are mis-transcribed.)         DREA Castillo CNP, APRN - CNP  01/28/22 1814

## 2022-01-25 NOTE — ED NOTES
Pt to er. Pt c/o headache and body aches that started this morning. States multiple people are out at work with [employer].       Michelle Hill RN  01/24/22 2012

## 2022-01-28 ASSESSMENT — ENCOUNTER SYMPTOMS
EYE REDNESS: 0
BACK PAIN: 0

## 2022-08-11 ENCOUNTER — HOSPITAL ENCOUNTER (OUTPATIENT)
Dept: GENERAL RADIOLOGY | Age: 42
Discharge: HOME | End: 2022-08-11
Payer: MEDICARE

## 2022-08-11 ENCOUNTER — HOSPITAL ENCOUNTER (OUTPATIENT)
Age: 42
Discharge: HOME | End: 2022-08-11
Payer: MEDICARE

## 2022-08-11 DIAGNOSIS — M99.02 SEGMENTAL DYSFUNCTION OF THORACIC REGION: ICD-10-CM

## 2022-08-11 DIAGNOSIS — M99.01 CERVICAL SEGMENT DYSFUNCTION: ICD-10-CM

## 2022-08-11 DIAGNOSIS — M99.03 SEGMENTAL DYSFUNCTION OF LUMBAR REGION: ICD-10-CM

## 2022-08-11 PROCEDURE — 72170 X-RAY EXAM OF PELVIS: CPT

## 2022-08-11 PROCEDURE — 72050 X-RAY EXAM NECK SPINE 4/5VWS: CPT

## 2022-08-11 PROCEDURE — 72072 X-RAY EXAM THORAC SPINE 3VWS: CPT

## 2024-01-02 ENCOUNTER — APPOINTMENT (OUTPATIENT)
Dept: GENERAL RADIOLOGY | Age: 44
End: 2024-01-02
Payer: COMMERCIAL

## 2024-01-02 ENCOUNTER — APPOINTMENT (OUTPATIENT)
Dept: CT IMAGING | Age: 44
End: 2024-01-02
Payer: COMMERCIAL

## 2024-01-02 ENCOUNTER — HOSPITAL ENCOUNTER (INPATIENT)
Age: 44
LOS: 4 days | Discharge: HOME | End: 2024-01-06
Attending: STUDENT IN AN ORGANIZED HEALTH CARE EDUCATION/TRAINING PROGRAM | Admitting: INTERNAL MEDICINE
Payer: COMMERCIAL

## 2024-01-02 DIAGNOSIS — E83.42 HYPOMAGNESEMIA: ICD-10-CM

## 2024-01-02 DIAGNOSIS — D72.829 LEUKOCYTOSIS, UNSPECIFIED TYPE: ICD-10-CM

## 2024-01-02 DIAGNOSIS — I63.9 ISCHEMIC STROKE (HCC): Primary | ICD-10-CM

## 2024-01-02 PROBLEM — I61.9 CVA (CEREBROVASCULAR ACCIDENT DUE TO INTRACEREBRAL HEMORRHAGE) (HCC): Status: ACTIVE | Noted: 2024-01-02

## 2024-01-02 LAB
ALBUMIN SERPL BCG-MCNC: 4.3 G/DL (ref 3.5–5.1)
ALP SERPL-CCNC: 89 U/L (ref 38–126)
ALT SERPL W/O P-5'-P-CCNC: 16 U/L (ref 11–66)
ANION GAP SERPL CALC-SCNC: 13 MEQ/L (ref 8–16)
APTT PPP: 33.2 SECONDS (ref 22–38)
AST SERPL-CCNC: 17 U/L (ref 5–40)
BASOPHILS ABSOLUTE: 0.1 THOU/MM3 (ref 0–0.1)
BASOPHILS NFR BLD AUTO: 0.6 %
BILIRUB SERPL-MCNC: 0.2 MG/DL (ref 0.3–1.2)
BUN SERPL-MCNC: 11 MG/DL (ref 7–22)
CALCIUM SERPL-MCNC: 9.5 MG/DL (ref 8.5–10.5)
CHLORIDE SERPL-SCNC: 100 MEQ/L (ref 98–111)
CO2 SERPL-SCNC: 24 MEQ/L (ref 23–33)
CREAT SERPL-MCNC: 0.7 MG/DL (ref 0.4–1.2)
DEPRECATED RDW RBC AUTO: 43.3 FL (ref 35–45)
EOSINOPHIL NFR BLD AUTO: 2.1 %
EOSINOPHILS ABSOLUTE: 0.4 THOU/MM3 (ref 0–0.4)
ERYTHROCYTE [DISTWIDTH] IN BLOOD BY AUTOMATED COUNT: 13.3 % (ref 11.5–14.5)
GFR SERPL CREATININE-BSD FRML MDRD: > 60 ML/MIN/1.73M2
GLUCOSE SERPL-MCNC: 184 MG/DL (ref 70–108)
HCT VFR BLD AUTO: 40.3 % (ref 37–47)
HGB BLD-MCNC: 13.6 GM/DL (ref 12–16)
IMM GRANULOCYTES # BLD AUTO: 0.1 THOU/MM3 (ref 0–0.07)
IMM GRANULOCYTES NFR BLD AUTO: 0.6 %
INR PPP: 0.98 (ref 0.85–1.13)
LYMPHOCYTES ABSOLUTE: 4.6 THOU/MM3 (ref 1–4.8)
LYMPHOCYTES NFR BLD AUTO: 26.8 %
MAGNESIUM SERPL-MCNC: 1 MG/DL (ref 1.6–2.4)
MCH RBC QN AUTO: 30 PG (ref 26–33)
MCHC RBC AUTO-ENTMCNC: 33.7 GM/DL (ref 32.2–35.5)
MCV RBC AUTO: 89 FL (ref 81–99)
MONOCYTES ABSOLUTE: 0.8 THOU/MM3 (ref 0.4–1.3)
MONOCYTES NFR BLD AUTO: 4.6 %
NEUTROPHILS NFR BLD AUTO: 65.3 %
NRBC BLD AUTO-RTO: 0 /100 WBC
OSMOLALITY SERPL CALC.SUM OF ELEC: 278 MOSMOL/KG (ref 275–300)
PLATELET # BLD AUTO: 246 THOU/MM3 (ref 130–400)
PLATELET BLD QL SMEAR: ADEQUATE
PMV BLD AUTO: 10.7 FL (ref 9.4–12.4)
POC CREATININE WHOLE BLOOD: 0.6 MG/DL (ref 0.5–1.2)
POTASSIUM SERPL-SCNC: 3 MEQ/L (ref 3.5–5.2)
PROT SERPL-MCNC: 7.6 G/DL (ref 6.1–8)
RBC # BLD AUTO: 4.53 MILL/MM3 (ref 4.2–5.4)
SCAN OF BLOOD SMEAR: NORMAL
SEGMENTED NEUTROPHILS ABSOLUTE COUNT: 11.3 THOU/MM3 (ref 1.8–7.7)
SODIUM SERPL-SCNC: 137 MEQ/L (ref 135–145)
TROPONIN, HIGH SENSITIVITY: < 6 NG/L (ref 0–12)
VARIANT LYMPHS BLD QL SMEAR: ABNORMAL %
WBC # BLD AUTO: 17.3 THOU/MM3 (ref 4.8–10.8)

## 2024-01-02 PROCEDURE — 85730 THROMBOPLASTIN TIME PARTIAL: CPT

## 2024-01-02 PROCEDURE — 85025 COMPLETE CBC W/AUTO DIFF WBC: CPT

## 2024-01-02 PROCEDURE — 6370000000 HC RX 637 (ALT 250 FOR IP): Performed by: NURSE PRACTITIONER

## 2024-01-02 PROCEDURE — 99285 EMERGENCY DEPT VISIT HI MDM: CPT

## 2024-01-02 PROCEDURE — 70496 CT ANGIOGRAPHY HEAD: CPT

## 2024-01-02 PROCEDURE — 87641 MR-STAPH DNA AMP PROBE: CPT

## 2024-01-02 PROCEDURE — 70498 CT ANGIOGRAPHY NECK: CPT

## 2024-01-02 PROCEDURE — 3E03317 INTRODUCTION OF OTHER THROMBOLYTIC INTO PERIPHERAL VEIN, PERCUTANEOUS APPROACH: ICD-10-PCS | Performed by: STUDENT IN AN ORGANIZED HEALTH CARE EDUCATION/TRAINING PROGRAM

## 2024-01-02 PROCEDURE — 2580000003 HC RX 258: Performed by: NURSE PRACTITIONER

## 2024-01-02 PROCEDURE — 96374 THER/PROPH/DIAG INJ IV PUSH: CPT

## 2024-01-02 PROCEDURE — 70450 CT HEAD/BRAIN W/O DYE: CPT

## 2024-01-02 PROCEDURE — 85610 PROTHROMBIN TIME: CPT

## 2024-01-02 PROCEDURE — 83735 ASSAY OF MAGNESIUM: CPT

## 2024-01-02 PROCEDURE — 2580000003 HC RX 258: Performed by: STUDENT IN AN ORGANIZED HEALTH CARE EDUCATION/TRAINING PROGRAM

## 2024-01-02 PROCEDURE — 36415 COLL VENOUS BLD VENIPUNCTURE: CPT

## 2024-01-02 PROCEDURE — 82565 ASSAY OF CREATININE: CPT

## 2024-01-02 PROCEDURE — 84484 ASSAY OF TROPONIN QUANT: CPT

## 2024-01-02 PROCEDURE — 93005 ELECTROCARDIOGRAM TRACING: CPT | Performed by: STUDENT IN AN ORGANIZED HEALTH CARE EDUCATION/TRAINING PROGRAM

## 2024-01-02 PROCEDURE — 93010 ELECTROCARDIOGRAM REPORT: CPT | Performed by: INTERNAL MEDICINE

## 2024-01-02 PROCEDURE — 6360000002 HC RX W HCPCS: Performed by: NURSE PRACTITIONER

## 2024-01-02 PROCEDURE — 2000000000 HC ICU R&B

## 2024-01-02 PROCEDURE — 80053 COMPREHEN METABOLIC PANEL: CPT

## 2024-01-02 PROCEDURE — 6360000002 HC RX W HCPCS: Performed by: STUDENT IN AN ORGANIZED HEALTH CARE EDUCATION/TRAINING PROGRAM

## 2024-01-02 PROCEDURE — 87070 CULTURE OTHR SPECIMN AEROBIC: CPT

## 2024-01-02 PROCEDURE — 6360000004 HC RX CONTRAST MEDICATION: Performed by: STUDENT IN AN ORGANIZED HEALTH CARE EDUCATION/TRAINING PROGRAM

## 2024-01-02 RX ORDER — LABETALOL HYDROCHLORIDE 5 MG/ML
10 INJECTION INTRAVENOUS EVERY 4 HOURS PRN
Status: DISCONTINUED | OUTPATIENT
Start: 2024-01-02 | End: 2024-01-06 | Stop reason: HOSPADM

## 2024-01-02 RX ORDER — ATORVASTATIN CALCIUM 80 MG/1
80 TABLET, FILM COATED ORAL NIGHTLY
Status: DISCONTINUED | OUTPATIENT
Start: 2024-01-02 | End: 2024-01-06 | Stop reason: HOSPADM

## 2024-01-02 RX ORDER — POLYETHYLENE GLYCOL 3350 17 G/17G
17 POWDER, FOR SOLUTION ORAL DAILY PRN
Status: DISCONTINUED | OUTPATIENT
Start: 2024-01-02 | End: 2024-01-06 | Stop reason: HOSPADM

## 2024-01-02 RX ORDER — SODIUM CHLORIDE 0.9 % (FLUSH) 0.9 %
5-40 SYRINGE (ML) INJECTION EVERY 12 HOURS SCHEDULED
Status: DISCONTINUED | OUTPATIENT
Start: 2024-01-02 | End: 2024-01-06 | Stop reason: HOSPADM

## 2024-01-02 RX ORDER — PANTOPRAZOLE SODIUM 40 MG/1
40 TABLET, DELAYED RELEASE ORAL
Status: DISCONTINUED | OUTPATIENT
Start: 2024-01-03 | End: 2024-01-06 | Stop reason: HOSPADM

## 2024-01-02 RX ORDER — SODIUM CHLORIDE 9 MG/ML
INJECTION, SOLUTION INTRAVENOUS PRN
Status: DISCONTINUED | OUTPATIENT
Start: 2024-01-02 | End: 2024-01-06 | Stop reason: HOSPADM

## 2024-01-02 RX ORDER — SODIUM CHLORIDE 9 MG/ML
INJECTION, SOLUTION INTRAVENOUS CONTINUOUS
Status: DISCONTINUED | OUTPATIENT
Start: 2024-01-02 | End: 2024-01-03

## 2024-01-02 RX ORDER — SODIUM CHLORIDE 0.9 % (FLUSH) 0.9 %
10 SYRINGE (ML) INJECTION ONCE
Status: COMPLETED | OUTPATIENT
Start: 2024-01-02 | End: 2024-01-02

## 2024-01-02 RX ORDER — SODIUM CHLORIDE 0.9 % (FLUSH) 0.9 %
5-40 SYRINGE (ML) INJECTION PRN
Status: DISCONTINUED | OUTPATIENT
Start: 2024-01-02 | End: 2024-01-06 | Stop reason: HOSPADM

## 2024-01-02 RX ORDER — MAGNESIUM SULFATE IN WATER 40 MG/ML
4000 INJECTION, SOLUTION INTRAVENOUS ONCE
Status: COMPLETED | OUTPATIENT
Start: 2024-01-02 | End: 2024-01-03

## 2024-01-02 RX ORDER — SODIUM CHLORIDE 0.9 % (FLUSH) 0.9 %
5-40 SYRINGE (ML) INJECTION PRN
Status: DISCONTINUED | OUTPATIENT
Start: 2024-01-02 | End: 2024-01-05

## 2024-01-02 RX ORDER — ACETAMINOPHEN 325 MG/1
650 TABLET ORAL EVERY 4 HOURS PRN
Status: DISCONTINUED | OUTPATIENT
Start: 2024-01-02 | End: 2024-01-06 | Stop reason: HOSPADM

## 2024-01-02 RX ORDER — SODIUM CHLORIDE 0.9 % (FLUSH) 0.9 %
5-40 SYRINGE (ML) INJECTION EVERY 12 HOURS SCHEDULED
Status: DISCONTINUED | OUTPATIENT
Start: 2024-01-02 | End: 2024-01-05

## 2024-01-02 RX ORDER — POTASSIUM CHLORIDE 7.45 MG/ML
10 INJECTION INTRAVENOUS
Status: DISCONTINUED | OUTPATIENT
Start: 2024-01-03 | End: 2024-01-02

## 2024-01-02 RX ORDER — 0.9 % SODIUM CHLORIDE 0.9 %
500 INTRAVENOUS SOLUTION INTRAVENOUS ONCE
Status: COMPLETED | OUTPATIENT
Start: 2024-01-02 | End: 2024-01-02

## 2024-01-02 RX ORDER — POTASSIUM CHLORIDE 7.45 MG/ML
10 INJECTION INTRAVENOUS
Status: COMPLETED | OUTPATIENT
Start: 2024-01-03 | End: 2024-01-03

## 2024-01-02 RX ADMIN — TENECTEPLASE 24 MG: KIT at 21:38

## 2024-01-02 RX ADMIN — IOPAMIDOL 80 ML: 755 INJECTION, SOLUTION INTRAVENOUS at 20:47

## 2024-01-02 RX ADMIN — SODIUM CHLORIDE, PRESERVATIVE FREE 10 ML: 5 INJECTION INTRAVENOUS at 23:39

## 2024-01-02 RX ADMIN — SODIUM CHLORIDE, PRESERVATIVE FREE 10 ML: 5 INJECTION INTRAVENOUS at 21:33

## 2024-01-02 RX ADMIN — POTASSIUM BICARBONATE 40 MEQ: 782 TABLET, EFFERVESCENT ORAL at 23:39

## 2024-01-02 RX ADMIN — SODIUM CHLORIDE, PRESERVATIVE FREE 10 ML: 5 INJECTION INTRAVENOUS at 21:39

## 2024-01-02 RX ADMIN — POTASSIUM CHLORIDE 10 MEQ: 7.46 INJECTION, SOLUTION INTRAVENOUS at 23:37

## 2024-01-02 RX ADMIN — MAGNESIUM SULFATE HEPTAHYDRATE 4000 MG: 40 INJECTION, SOLUTION INTRAVENOUS at 23:30

## 2024-01-02 RX ADMIN — SODIUM CHLORIDE: 9 INJECTION, SOLUTION INTRAVENOUS at 22:21

## 2024-01-02 RX ADMIN — SODIUM CHLORIDE 500 ML: 9 INJECTION, SOLUTION INTRAVENOUS at 21:15

## 2024-01-02 ASSESSMENT — PAIN SCALES - GENERAL: PAINLEVEL_OUTOF10: 0

## 2024-01-03 ENCOUNTER — APPOINTMENT (OUTPATIENT)
Dept: MRI IMAGING | Age: 44
End: 2024-01-03
Attending: NURSE PRACTITIONER
Payer: COMMERCIAL

## 2024-01-03 ENCOUNTER — APPOINTMENT (OUTPATIENT)
Dept: CT IMAGING | Age: 44
End: 2024-01-03
Payer: COMMERCIAL

## 2024-01-03 ENCOUNTER — APPOINTMENT (OUTPATIENT)
Dept: GENERAL RADIOLOGY | Age: 44
End: 2024-01-03
Payer: COMMERCIAL

## 2024-01-03 ENCOUNTER — APPOINTMENT (OUTPATIENT)
Age: 44
End: 2024-01-03
Attending: NURSE PRACTITIONER
Payer: COMMERCIAL

## 2024-01-03 PROBLEM — I63.9 ISCHEMIC STROKE (HCC): Status: ACTIVE | Noted: 2024-01-03

## 2024-01-03 LAB
ALBUMIN SERPL BCG-MCNC: 3.5 G/DL (ref 3.5–5.1)
ALP SERPL-CCNC: 80 U/L (ref 38–126)
ALT SERPL W/O P-5'-P-CCNC: 16 U/L (ref 11–66)
ANION GAP SERPL CALC-SCNC: 13 MEQ/L (ref 8–16)
AST SERPL-CCNC: 14 U/L (ref 5–40)
BILIRUB CONJ SERPL-MCNC: < 0.2 MG/DL (ref 0–0.3)
BILIRUB SERPL-MCNC: 0.2 MG/DL (ref 0.3–1.2)
BUN SERPL-MCNC: 9 MG/DL (ref 7–22)
CALCIUM SERPL-MCNC: 8.7 MG/DL (ref 8.5–10.5)
CHLORIDE SERPL-SCNC: 102 MEQ/L (ref 98–111)
CHOLEST SERPL-MCNC: 219 MG/DL (ref 100–199)
CO2 SERPL-SCNC: 24 MEQ/L (ref 23–33)
CREAT SERPL-MCNC: 0.8 MG/DL (ref 0.4–1.2)
DEPRECATED MEAN GLUCOSE BLD GHB EST-ACNC: 117 MG/DL (ref 70–126)
DEPRECATED RDW RBC AUTO: 44.2 FL (ref 35–45)
ECHO AV CUSP MM: 1.8 CM
ECHO AV PEAK GRADIENT: 5 MMHG
ECHO AV PEAK VELOCITY: 1.1 M/S
ECHO AV VELOCITY RATIO: 1
ECHO BSA: 1.98 M2
ECHO LA AREA 2C: 15.5 CM2
ECHO LA AREA 4C: 10.7 CM2
ECHO LA DIAMETER INDEX: 1.51 CM/M2
ECHO LA DIAMETER: 2.9 CM
ECHO LA MAJOR AXIS: 4.5 CM
ECHO LA MINOR AXIS: 4.5 CM
ECHO LA VOL BP: 30 ML (ref 22–52)
ECHO LA VOL MOD A2C: 44 ML (ref 22–52)
ECHO LA VOL MOD A4C: 21 ML (ref 22–52)
ECHO LA VOL/BSA BIPLANE: 16 ML/M2 (ref 16–34)
ECHO LA VOLUME INDEX MOD A2C: 23 ML/M2 (ref 16–34)
ECHO LA VOLUME INDEX MOD A4C: 11 ML/M2 (ref 16–34)
ECHO LV E' LATERAL VELOCITY: 11 CM/S
ECHO LV E' SEPTAL VELOCITY: 7 CM/S
ECHO LV EDV A2C: 90 ML
ECHO LV EDV A4C: 70 ML
ECHO LV EDV INDEX A4C: 36 ML/M2
ECHO LV EDV NDEX A2C: 47 ML/M2
ECHO LV EJECTION FRACTION A2C: 49 %
ECHO LV EJECTION FRACTION A4C: 40 %
ECHO LV EJECTION FRACTION BIPLANE: 45 % (ref 55–100)
ECHO LV ESV A2C: 46 ML
ECHO LV ESV A4C: 42 ML
ECHO LV ESV INDEX A2C: 24 ML/M2
ECHO LV ESV INDEX A4C: 22 ML/M2
ECHO LV FRACTIONAL SHORTENING: 22 % (ref 28–44)
ECHO LV INTERNAL DIMENSION DIASTOLE INDEX: 2.34 CM/M2
ECHO LV INTERNAL DIMENSION DIASTOLIC: 4.5 CM (ref 3.9–5.3)
ECHO LV INTERNAL DIMENSION SYSTOLIC INDEX: 1.82 CM/M2
ECHO LV INTERNAL DIMENSION SYSTOLIC: 3.5 CM
ECHO LV IVSD: 1.2 CM (ref 0.6–0.9)
ECHO LV MASS 2D: 164 G (ref 67–162)
ECHO LV MASS INDEX 2D: 85.4 G/M2 (ref 43–95)
ECHO LV POSTERIOR WALL DIASTOLIC: 0.9 CM (ref 0.6–0.9)
ECHO LV RELATIVE WALL THICKNESS RATIO: 0.4
ECHO LVOT PEAK GRADIENT: 5 MMHG
ECHO LVOT PEAK VELOCITY: 1.1 M/S
ECHO MV A VELOCITY: 0.71 M/S
ECHO MV E DECELERATION TIME (DT): 183 MS
ECHO MV E VELOCITY: 0.77 M/S
ECHO MV E/A RATIO: 1.08
ECHO MV E/E' LATERAL: 7
ECHO MV E/E' RATIO (AVERAGED): 9
ECHO PV MAX VELOCITY: 0.7 M/S
ECHO PV PEAK GRADIENT: 2 MMHG
ECHO RV INTERNAL DIMENSION: 1.9 CM
ECHO TV E WAVE: 0.3 M/S
EKG ATRIAL RATE: 97 BPM
EKG P AXIS: 38 DEGREES
EKG P-R INTERVAL: 154 MS
EKG Q-T INTERVAL: 356 MS
EKG QRS DURATION: 96 MS
EKG QTC CALCULATION (BAZETT): 452 MS
EKG R AXIS: 51 DEGREES
EKG T AXIS: 31 DEGREES
EKG VENTRICULAR RATE: 97 BPM
ERYTHROCYTE [DISTWIDTH] IN BLOOD BY AUTOMATED COUNT: 13.5 % (ref 11.5–14.5)
GFR SERPL CREATININE-BSD FRML MDRD: > 60 ML/MIN/1.73M2
GLUCOSE SERPL-MCNC: 98 MG/DL (ref 70–108)
HBA1C MFR BLD HPLC: 5.9 % (ref 4.4–6.4)
HCT VFR BLD AUTO: 38.8 % (ref 37–47)
HDLC SERPL-MCNC: 21 MG/DL
HGB BLD-MCNC: 12.8 GM/DL (ref 12–16)
LDLC SERPL CALC-MCNC: ABNORMAL MG/DL
MAGNESIUM SERPL-MCNC: 2 MG/DL (ref 1.6–2.4)
MCH RBC QN AUTO: 29.9 PG (ref 26–33)
MCHC RBC AUTO-ENTMCNC: 33 GM/DL (ref 32.2–35.5)
MCV RBC AUTO: 90.7 FL (ref 81–99)
MRSA DNA SPEC QL NAA+PROBE: NEGATIVE
PLATELET # BLD AUTO: 230 THOU/MM3 (ref 130–400)
PMV BLD AUTO: 10.5 FL (ref 9.4–12.4)
POTASSIUM SERPL-SCNC: 3.4 MEQ/L (ref 3.5–5.2)
POTASSIUM SERPL-SCNC: 3.4 MEQ/L (ref 3.5–5.2)
PROT SERPL-MCNC: 6.6 G/DL (ref 6.1–8)
RBC # BLD AUTO: 4.28 MILL/MM3 (ref 4.2–5.4)
REASON FOR REJECTION: NORMAL
REJECTED TEST: NORMAL
SODIUM SERPL-SCNC: 139 MEQ/L (ref 135–145)
TRIGL SERPL-MCNC: 611 MG/DL (ref 0–199)
TSH SERPL DL<=0.005 MIU/L-ACNC: 1.45 UIU/ML (ref 0.4–4.2)
WBC # BLD AUTO: 17.4 THOU/MM3 (ref 4.8–10.8)

## 2024-01-03 PROCEDURE — 2580000003 HC RX 258: Performed by: NURSE PRACTITIONER

## 2024-01-03 PROCEDURE — 83735 ASSAY OF MAGNESIUM: CPT

## 2024-01-03 PROCEDURE — 92611 MOTION FLUOROSCOPY/SWALLOW: CPT

## 2024-01-03 PROCEDURE — 2580000003 HC RX 258: Performed by: STUDENT IN AN ORGANIZED HEALTH CARE EDUCATION/TRAINING PROGRAM

## 2024-01-03 PROCEDURE — 36415 COLL VENOUS BLD VENIPUNCTURE: CPT

## 2024-01-03 PROCEDURE — 93306 TTE W/DOPPLER COMPLETE: CPT

## 2024-01-03 PROCEDURE — 80053 COMPREHEN METABOLIC PANEL: CPT

## 2024-01-03 PROCEDURE — 84443 ASSAY THYROID STIM HORMONE: CPT

## 2024-01-03 PROCEDURE — 80061 LIPID PANEL: CPT

## 2024-01-03 PROCEDURE — 71045 X-RAY EXAM CHEST 1 VIEW: CPT

## 2024-01-03 PROCEDURE — 74230 X-RAY XM SWLNG FUNCJ C+: CPT

## 2024-01-03 PROCEDURE — 97530 THERAPEUTIC ACTIVITIES: CPT

## 2024-01-03 PROCEDURE — 6370000000 HC RX 637 (ALT 250 FOR IP): Performed by: NURSE PRACTITIONER

## 2024-01-03 PROCEDURE — 92610 EVALUATE SWALLOWING FUNCTION: CPT

## 2024-01-03 PROCEDURE — 70450 CT HEAD/BRAIN W/O DYE: CPT

## 2024-01-03 PROCEDURE — 97162 PT EVAL MOD COMPLEX 30 MIN: CPT

## 2024-01-03 PROCEDURE — 92523 SPEECH SOUND LANG COMPREHEN: CPT

## 2024-01-03 PROCEDURE — 2500000003 HC RX 250 WO HCPCS: Performed by: INTERNAL MEDICINE

## 2024-01-03 PROCEDURE — 97110 THERAPEUTIC EXERCISES: CPT

## 2024-01-03 PROCEDURE — 93306 TTE W/DOPPLER COMPLETE: CPT | Performed by: INTERNAL MEDICINE

## 2024-01-03 PROCEDURE — 99233 SBSQ HOSP IP/OBS HIGH 50: CPT | Performed by: INTERNAL MEDICINE

## 2024-01-03 PROCEDURE — 95718 EEG PHYS/QHP 2-12 HR W/VEEG: CPT | Performed by: PSYCHIATRY & NEUROLOGY

## 2024-01-03 PROCEDURE — 83036 HEMOGLOBIN GLYCOSYLATED A1C: CPT

## 2024-01-03 PROCEDURE — 95816 EEG AWAKE AND DROWSY: CPT

## 2024-01-03 PROCEDURE — 70551 MRI BRAIN STEM W/O DYE: CPT

## 2024-01-03 PROCEDURE — 6370000000 HC RX 637 (ALT 250 FOR IP)

## 2024-01-03 PROCEDURE — 99223 1ST HOSP IP/OBS HIGH 75: CPT

## 2024-01-03 PROCEDURE — 82248 BILIRUBIN DIRECT: CPT

## 2024-01-03 PROCEDURE — 95711 VEEG 2-12 HR UNMONITORED: CPT

## 2024-01-03 PROCEDURE — 97165 OT EVAL LOW COMPLEX 30 MIN: CPT

## 2024-01-03 PROCEDURE — 2000000000 HC ICU R&B

## 2024-01-03 PROCEDURE — 6360000002 HC RX W HCPCS: Performed by: NURSE PRACTITIONER

## 2024-01-03 PROCEDURE — 85027 COMPLETE CBC AUTOMATED: CPT

## 2024-01-03 PROCEDURE — 84132 ASSAY OF SERUM POTASSIUM: CPT

## 2024-01-03 RX ORDER — POTASSIUM CHLORIDE 7.45 MG/ML
10 INJECTION INTRAVENOUS
Status: COMPLETED | OUTPATIENT
Start: 2024-01-03 | End: 2024-01-03

## 2024-01-03 RX ORDER — POTASSIUM CHLORIDE 7.45 MG/ML
10 INJECTION INTRAVENOUS PRN
Status: DISCONTINUED | OUTPATIENT
Start: 2024-01-03 | End: 2024-01-06 | Stop reason: HOSPADM

## 2024-01-03 RX ORDER — SODIUM CHLORIDE 9 MG/ML
INJECTION, SOLUTION INTRAVENOUS PRN
Status: CANCELLED | OUTPATIENT
Start: 2024-01-03

## 2024-01-03 RX ORDER — SODIUM CHLORIDE 0.9 % (FLUSH) 0.9 %
10 SYRINGE (ML) INJECTION ONCE
Status: CANCELLED | OUTPATIENT
Start: 2024-01-03 | End: 2024-01-03

## 2024-01-03 RX ORDER — SODIUM CHLORIDE 0.9 % (FLUSH) 0.9 %
5-40 SYRINGE (ML) INJECTION EVERY 12 HOURS SCHEDULED
Status: CANCELLED | OUTPATIENT
Start: 2024-01-03

## 2024-01-03 RX ORDER — POTASSIUM CHLORIDE 20 MEQ/1
40 TABLET, EXTENDED RELEASE ORAL PRN
Status: DISCONTINUED | OUTPATIENT
Start: 2024-01-03 | End: 2024-01-06 | Stop reason: HOSPADM

## 2024-01-03 RX ORDER — SODIUM CHLORIDE 0.9 % (FLUSH) 0.9 %
5-40 SYRINGE (ML) INJECTION PRN
Status: CANCELLED | OUTPATIENT
Start: 2024-01-03

## 2024-01-03 RX ADMIN — POTASSIUM CHLORIDE 40 MEQ: 1500 TABLET, EXTENDED RELEASE ORAL at 17:07

## 2024-01-03 RX ADMIN — PANTOPRAZOLE SODIUM 40 MG: 40 TABLET, DELAYED RELEASE ORAL at 06:07

## 2024-01-03 RX ADMIN — ATORVASTATIN CALCIUM 80 MG: 80 TABLET, FILM COATED ORAL at 20:15

## 2024-01-03 RX ADMIN — POTASSIUM CHLORIDE 10 MEQ: 7.46 INJECTION, SOLUTION INTRAVENOUS at 06:08

## 2024-01-03 RX ADMIN — POTASSIUM BICARBONATE 40 MEQ: 782 TABLET, EFFERVESCENT ORAL at 06:07

## 2024-01-03 RX ADMIN — POTASSIUM CHLORIDE 10 MEQ: 7.46 INJECTION, SOLUTION INTRAVENOUS at 00:35

## 2024-01-03 RX ADMIN — BARIUM SULFATE 20 ML: 0.81 POWDER, FOR SUSPENSION ORAL at 10:19

## 2024-01-03 RX ADMIN — ATORVASTATIN CALCIUM 80 MG: 80 TABLET, FILM COATED ORAL at 00:07

## 2024-01-03 RX ADMIN — POTASSIUM CHLORIDE 10 MEQ: 7.46 INJECTION, SOLUTION INTRAVENOUS at 07:15

## 2024-01-03 RX ADMIN — SODIUM CHLORIDE, PRESERVATIVE FREE 10 ML: 5 INJECTION INTRAVENOUS at 08:28

## 2024-01-03 RX ADMIN — SODIUM CHLORIDE, PRESERVATIVE FREE 10 ML: 5 INJECTION INTRAVENOUS at 08:29

## 2024-01-03 RX ADMIN — BARIUM SULFATE 10 ML: 400 PASTE ORAL at 10:18

## 2024-01-03 RX ADMIN — SODIUM CHLORIDE, PRESERVATIVE FREE 10 ML: 5 INJECTION INTRAVENOUS at 20:15

## 2024-01-03 RX ADMIN — POTASSIUM CHLORIDE 10 MEQ: 7.46 INJECTION, SOLUTION INTRAVENOUS at 01:33

## 2024-01-03 RX ADMIN — SODIUM CHLORIDE: 9 INJECTION, SOLUTION INTRAVENOUS at 03:29

## 2024-01-03 RX ADMIN — ACETAMINOPHEN 650 MG: 325 TABLET ORAL at 00:05

## 2024-01-03 ASSESSMENT — PAIN - FUNCTIONAL ASSESSMENT: PAIN_FUNCTIONAL_ASSESSMENT: ACTIVITIES ARE NOT PREVENTED

## 2024-01-03 ASSESSMENT — PAIN DESCRIPTION - PAIN TYPE: TYPE: ACUTE PAIN

## 2024-01-03 ASSESSMENT — PAIN DESCRIPTION - ONSET: ONSET: ON-GOING

## 2024-01-03 ASSESSMENT — PAIN DESCRIPTION - LOCATION: LOCATION: HEAD

## 2024-01-03 ASSESSMENT — ENCOUNTER SYMPTOMS
COUGH: 0
NAUSEA: 0
ABDOMINAL PAIN: 0
SHORTNESS OF BREATH: 0
VOMITING: 0

## 2024-01-03 ASSESSMENT — PAIN SCALES - GENERAL
PAINLEVEL_OUTOF10: 0
PAINLEVEL_OUTOF10: 5
PAINLEVEL_OUTOF10: 0

## 2024-01-03 ASSESSMENT — PAIN DESCRIPTION - DESCRIPTORS: DESCRIPTORS: ACHING

## 2024-01-03 ASSESSMENT — PAIN DESCRIPTION - FREQUENCY: FREQUENCY: INTERMITTENT

## 2024-01-03 NOTE — PROGRESS NOTES
[facility]     Neurodiagnostic Laboratory Technician Worksheet      EEG Date: 1/3/2024    Name: April KRYSTIAN Ruelas   : 1980   Age: 43 y.o.  SEX: female    ROOM: 14 MRN: [id number]           CSN: [id number]      Ordering Provider: aGrry  EEG Number: [id number] Time of Test:  1405    Hand: Right   Sedation: no    H.V. Done: No  not done, lethargic  Photic: No    Sleep: Yes  Drowsy: No   Sleep Deprived: Yes    Seizures observed: no    Mentality: lethargic      Clinical History: syncope at work,  unable to talk  Mri  IMPRESSION:     1. No evidence of an acute infarct.  2. 2 faint foci of signal hyperintensity are present in the subcortical white matter the right frontal lobe. These are nonspecific. These could be due to prior trauma, prior inflammation, chronic headaches, or early chronic small vessel ischemic changes.        Past Medical History:       Diagnosis Date    Asthma     Gout     IBS (irritable bowel syndrome)     MDRO (multiple drug resistant organisms) resistance 2013    left great toe    Vertigo        Scheduled Meds:   sodium chloride flush  5-40 mL IntraVENous 2 times per day    sodium chloride flush  5-40 mL IntraVENous 2 times per day    atorvastatin  80 mg Oral Nightly    pantoprazole  40 mg Oral QAM AC     Continuous Infusions:   sodium chloride      sodium chloride       PRN Meds:.potassium chloride **OR** potassium alternative oral replacement **OR** potassium chloride, sodium chloride flush, sodium chloride, dextrose bolus, sodium chloride flush, sodium chloride, polyethylene glycol, labetalol, acetaminophen    Technician: Alexandra Alexander 1/3/2024

## 2024-01-03 NOTE — CODE DOCUMENTATION
Pt arrived to ED via EMS with complaints of being found unconscious on floor at work. When ems arrived pt was alert but not responding. Pt last known well is 1-*2 hours prior to arrival. Dr. Bonilla to bedside.

## 2024-01-03 NOTE — PROGRESS NOTES
wounds or ulcers.  PSYC:  -Awake, alert, oriented x 4. Appropriate affect. Limited verbal interaction due to aphasia secondary to stroke.  NEURO: CN II-XII grossly intact. No lateralization. No pronator drift. Normal finger-to-nose, heel-to-shin. Symmetric smile.     RESULT SUMMARY:  7 points  NIH Stroke Scale      INPUTS:  1A: Level of consciousness --> 0 = Alert; keenly responsive  1B: Ask month and age --> 2 = Aphasic  1C: 'Blink eyes' & 'squeeze hands' --> 0 = Performs both tasks  2: Horizontal extraocular movements --> 0 = Normal  3: Visual fields --> 0 = No visual loss  4: Facial palsy --> 0 = Normal symmetry  5A: Left arm motor drift --> 0 = No drift for 10 seconds  5B: Right arm motor drift --> 0 = No drift for 10 seconds  6A: Left leg motor drift --> 0 = No drift for 5 seconds  6B: Right leg motor drift --> 0 = No drift for 5 seconds  7: Limb Ataxia --> 0 = No ataxia  8: Sensation --> 0 = Normal; no sensory loss  9: Language/aphasia --> 3 = Mute/global aphasia: no usable speech/auditory comprehension  10: Dysarthria --> 2 = Mute/anarthric  11: Extinction/inattention --> 0 = No abnormality      Data: (All radiographs, tracings, PFTs, and imaging are personally viewed and interpreted unless otherwise noted).   1/4/24: Na 137 K 3.6 Cl 101 CO2 26 BUN 11 Cr 0.7 AG 10 Gluc 108  1/4/24: WBC 17.8 Hgb 13.8 Hct 41.4   1/3/24 Echo: Moderately reduced left ventricular systolic function with a visually estimated EF of 45%. Left ventricle size is normal. Normal wall thickness. Moderate global hypokinesis present.   1/3/24 5-hr EEG: unremarkable  1/3/24 Barium swallow with video: No laryngeal penetration or aspiration of barium. 2. Additional recommendations from the speech therapist will follow    1/3/24 MRI Jhony wo: No evidence of an acute infarct. Faint foci of signal hyperintensity are present in the subcortical white matter the right frontal lobe. These are nonspecific. These could be due to prior trauma,  prior inflammation, chronic headaches, or early chronic small vessel ischemic changes.  1/3/24: Repeat CT head: No acute intracranial process.

## 2024-01-03 NOTE — PROGRESS NOTES
[facility]  SPEECH THERAPY  STRZ ICU 4D  Modified Barium Swallow    SLP Individual Minutes  Time In: 1010  Time Out: 1020  Minutes: 10  Timed Code Treatment Minutes: 0 Minutes       Date: 1/3/2024  Patient Name: Ivy Ruelas      CSN: [id number]   : 1980  (43 y.o.)  Gender: female   Referring Physician:  Dyana Rizo MD  Diagnosis: CVA  Precautions: Fall risk, aspiration precautions  History of Present Illness/Injury: Patient admitted to [facility] with above med dx; please refer to physician H&P for full details. Per chart review, \"3 y.o. female with past medical history of vertigo, asthma, gout, and MDRO who was presented to [facility] ED after being found unconscious on the floor at work by her co-worker. Reported that when EMS arrived in the scene, the patient was awake and alert but unable to talk or communicate. Patient's last known well was 1-2 hours prior to ED arrival. Stroke alert was activated. CT Head wo contrast shows no acute intracranial abnormality. CTA neck and head w wo contrast shows no hemodynamically significant narrowing of the cervical carotid artery okay or vertebral artery circulation. Neurology was immediately consulted by ED physician, Dr. Bonilla. TNK treatment was discussed to patient's family by ED physician, and was administered at 21:38. Patient got admitted to ICU for post TNK management and close hemodynamic monitoring. On ICU arrival, patient is awake, alert, and oriented but still mute, unable to communicate with global aphasia. But able to write and nods head to communicate. Daughter at bedside stated that the patient's stroke symptoms has improvement. Earlier patient cannot lift up her both upper extremities and not following commands. But now patient able to follow commands, able to lift all extremities, there is a drift but noticed some effort against gravity. No acute respiratory distress noted while on room air. Family and patient confirmed full  aspiration, Reviewed ST goals and Plan of Care, Reviewed recommendations for follow-up, and Education Related to Stroke Diagnosis  Evaluation of Education: Verbalizes understanding, Demonstrates with assistance, Needs further instruction, and Family not present    PLAN:  Skilled SLP intervention on acute care 3-5 x per week or until goals met and/or pt plateaus in function.  Specific interventions for next session may include: dysphagia management, expressive/receptive language tasks.    PATIENT GOAL:    Did not state.  Will further assess during treatment.    SHORT TERM GOALS:  Short Term Goals  Time Frame for Short Term Goals: 2 weeks  Goal 1: Patient will consume baseline diet of regular solids, thin liquids without pulmonary compromise and with implementation of trained compensatory strategies to assist with nutrition/hydration measures.  Goal 2: Patient will execute multi-step commands, answer complex yes/no questions, and complete higher level functional comprehension tasks with 90% accuracy, min cues to improve contributions within immediate context.  Goal 3: Patient will engage in multi-modal communication efforts (e.g, communication board, handwriting, iPad/iPhone applications) with appropriate processing speed and accurate selections/transcriptions to assist in conveying desired message.  Goal 4: Patient will approximate basic verbalizations (vowels, grunts, clicks, hums, coughs, etc) with 25% accuracy, mod cues to assist with establishment for foundation of verbal expression.  Goal 5: Complete alternate cognitive linguistic evaluation via MOCA as it becomes clinically indicated.      LONG TERM GOALS:  No LTG established given short JEYSON Mao M.A., CCC-SLP [id number]

## 2024-01-03 NOTE — CONSULTS
Neurology Consult Note    Date:1/3/2024       Room:4D-14/014-A  Patient Name:Ivy Ruelas     YOB: 1980     Age:43 y.o.    Requesting Physician: Dyana Rizo MD     Reason for Consult:  Evaluate for stroke alert last night      Chief Complaint:   Chief Complaint   Patient presents with    Altered Mental Status       Subjective     Ivy Ruelas is a 43 y.o. female with a history of asthma, gout, IBS, and vertigo who presented to [facility] ED yesterday 1/2 with the chief complaint of altered mental status and unresponsiveness. 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 Per ED chart review patient was minimally responsive in the ED. She was arousable to voice and able to follow commands. Initial NIH was screened in the ED by ED provider and was found to be 17. Patient was aphasic, had BUE drift, minimal resistance in BLE. Initial glucose was 253. Blood pressure in the ED was 172/116. Stat CT head obtained and was negative for any acute hemorrhage. Dr. Jean neurointerventionalist was contacted by the ED provider and case was discussed in detail. Given patient presented within the time window to receive TNK and had no contraindications to TNK it was administered at 2138. Stat CTA head and neck obtained and was negative for LVO. Given this patient was deemed not a candidate for neurointervention. Patient was then admitted to the ICU for close monitoring. When patient arrived to ICU her NIH had improved to a 10 - 2 for LOC questions, 2 for LUE drift, 1 for RUE drift, 1 for RLE drift, 1 for LLE drift, and 3 for best language. No acute events happened overnight with the patient and no angioedema documented. On examination of patient this morning she is able to follow commands. No drift noted in any extremity. Patient was still mute and became tearful when attempting to speak. Patient's family at bedside deny any known seizure history.  Lipitor 80mg.  Smoking and alcohol cessation when applicable.  Provide stroke eduction for individualized risk factors.  DVT prophylaxis with SCDs (hold heparin/lovenox for 24 hours after IV tPA or IA intervention)  Other recommendations  Dysphagia screen prior to oral intake  PT/OT/SLP Consult  EKG/Telemetry to monitor for atrial fibrillation  NIHSS every shift. Neuro checks per unit unless otherwise specified.  Head of bed 45 degree.    4 hour EEG ordered for further evaluation.  WBC elevated at 17.4. Recommend work-up and treatment as applicable per primary team.   Patient has allergy to Aspirin, after 24 hour post TNK administration and repeat CT head negative for bleed patient to being taking Plavix 75mg.   Neurology following. Please reach out with any further questions or concerns.    This patient was seen and evaluated with Dr. Jean and he is in agreement with the assessment and plan.    Electronically signed by SEVERINO Joseph on 1/3/24 at 2:49 PM EST

## 2024-01-03 NOTE — PLAN OF CARE
redness and/or skin breakdown  2.  Assess vascular access sites hourly  3.  Every 4-6 hours minimum:  Change oxygen saturation probe site  4.  Every 4-6 hours:  If on nasal continuous positive airway pressure, respiratory therapy assess nares and determine need for appliance change or resting period.  Outcome: Progressing    Care plan reviewed with patient and family.  Patient and family verbalizes understanding of the plan of care and contributes to goal setting.

## 2024-01-03 NOTE — PROGRESS NOTES
Patient arrived to unit from ED via bed. Patient transferred to ICU bed and placed on continuous ICU bedside monitor. Patient admitted for CVA (cerebrovascular accident due to intracerebral hemorrhage) ([facility]) [I61.9]  Ischemic stroke (HCC) [I63.9]. Vitals obtained. See flowsheets. Patient's IV access includes 20G R FA and 22G R hand. Current infusions and rates of infusion include NS @100ml/hr. Assessment completed by Clare TOVAR. Two nurse skin assessment completed by Clare Tovar and Nona TOVAR. See flowsheets for assessment details. Policies and procedures of ICU able to be explained to patient at this time. Family member(s)/representative(s) present at time of admission include none. Patient rights explained to family member(s)/representatives and patient, as able. Patient/patient's family member(s)/representative(s) N/A to have physician notified of their admission. All questions posed by patient's family member(s)/representative(s) and patient answered at this time.

## 2024-01-03 NOTE — PROGRESS NOTES
ADL T-Scale Score : 38.66  ADL Inpatient CMS 0-100% Score: 46.65    Activity Tolerance:  Patient tolerance of  treatment: Fair treatment tolerance       Modified Moon Scale:  +4 - Moderately severe disability; unable to walk and attend to bodily needs without assistance    Assessment:  Assessment: Pt demo mildly decreased ADL & functional mobility indep over PLOF s/p admission with CVA. Continued OT recommended to faciliate improvements in the above areas to increase indep & safety for returning home with family.  Performance deficits / Impairments: Decreased functional mobility , Decreased ADL status, Decreased cognition, Decreased balance, Decreased safe awareness, Decreased endurance, Decreased high-level IADLs  Prognosis: Fair, Good  REQUIRES OT FOLLOW-UP: Yes  Decision Making: Low Complexity    Treatment Initiated: Treatment and education initiated within context of evaluation.  Evaluation time included review of current medical information, gathering information related to past medical, social and functional history, completion of standardized testing, formal and informal observation of tasks, assessment of data and development of plan of care and goals.  Treatment time included skilled education and facilitation of tasks to increase safety and independence with ADL's for improved functional independence and quality of life.    Discharge Recommendations:  Continue to assess pending progress    Patient Education:     Patient Education  Education Given To: Patient  Education Provided: Role of Therapy, Plan of Care, ADL Adaptive Strategies  Education Method: Demonstration, Verbal  Barriers to Learning: Cognition  Education Outcome: Continued education needed    Equipment Recommendations:  Other: monitor pending progress    Plan:  Times Per Week: 6x  Current Treatment Recommendations: Functional mobility training, Balance training, Endurance training, Safety education & training, Patient/Caregiver education &

## 2024-01-03 NOTE — ED NOTES
ED to inpatient nurses report      Chief Complaint:  Chief Complaint   Patient presents with    Altered Mental Status     Present to ED from: Home    MOA:     LOC: alert and orientated to name, place, date   Mobility: Fully dependent  Oxygen Baseline: RA    Current needs required: RA     Code Status:   Prior    What abnormal results were found and what did you give/do to treat them? Ischemic stroke not noted on ct. TNK  Any procedures or intervention occur? NA    Mental Status:  Level of Consciousness: Alert (0)    Psych Assessment:        Vitals:  Patient Vitals for the past 24 hrs:   BP Temp Temp src Pulse Resp SpO2 Weight   01/02/24 2223 (!) 138/96 -- -- (!) 101 22 97 % --   01/02/24 2208 (!) 153/96 -- -- 96 18 99 % --   01/02/24 2153 (!) 134/102 -- -- (!) 103 19 98 % --   01/02/24 2138 (!) 134/98 -- -- (!) 103 19 96 % --   01/02/24 2132 (!) 144/96 -- -- (!) 101 19 -- --   01/02/24 2118 -- -- -- -- -- -- 95 kg (209 lb 7 oz)   01/02/24 2115 (!) 159/98 -- -- (!) 108 18 97 % --   01/02/24 2108 138/80 -- -- 89 15 99 % --   01/02/24 2103 (!) 175/101 -- -- (!) 110 23 98 % --   01/02/24 2030 (!) 170/99 97.8 °F (36.6 °C) Oral (!) 113 23 99 % --   01/02/24 2025 (!) 172/116 -- -- -- -- 91 % --        LDAs:   Peripheral IV 01/02/24 Left Antecubital (Active)   Site Assessment Clean, dry & intact 01/02/24 2030       Peripheral IV 01/02/24 Right;Posterior Forearm (Active)   Site Assessment Clean, dry & intact 01/02/24 2031       Peripheral IV 01/02/24 Posterior;Right Hand (Active)       Ambulatory Status:  No data recorded    Diagnosis:  DISPOSITION Admitted 01/02/2024 09:55:35 PM   Final diagnoses:   Ischemic stroke (HCC)        Consults:  IP CONSULT TO STROKE TEAM     Pain Score:       C-SSRS:        Sepsis Screening:  Sepsis Risk Score: 3.05    Penrose Fall Risk:       Swallow Screening        Preferred Language:   English      ALLERGIES     Aspirin    SURGICAL HISTORY       Past Surgical History:   Procedure Laterality Date

## 2024-01-03 NOTE — PROGRESS NOTES
[facility]  SPEECH THERAPY  STRZ ICU 4D  Speech - Language - Cognitive Evaluation + Clinical Swallow Evaluation    SLP Individual Minutes  Time In: 0846  Time Out: 0910  Minutes: 24  Timed Code Treatment Minutes: 0 Minutes     Speech, Language, Cognitive Evaluation: 15 minutes  Clinical Swallow Evaluation: 9 minutes    Date: 1/3/2024  Patient Name: Ivy Ruelas      CSN: [id number]   : 1980  (43 y.o.)  Gender: female   Referring Physician: Jenae Ryan APRN - CNP   Diagnosis: CVA  Precautions: Fall risk, aspiration precautions  History of Present Illness/Injury: Patient admitted to [facility] with above med dx; please refer to physician H&P for full details. Per chart review, \"3 y.o. female with past medical history of vertigo, asthma, gout, and MDRO who was presented to [facility] ED after being found unconscious on the floor at work by her co-worker. Reported that when EMS arrived in the scene, the patient was awake and alert but unable to talk or communicate. Patient's last known well was 1-2 hours prior to ED arrival. Stroke alert was activated. CT Head wo contrast shows no acute intracranial abnormality. CTA neck and head w wo contrast shows no hemodynamically significant narrowing of the cervical carotid artery okay or vertebral artery circulation. Neurology was immediately consulted by ED physician, Dr. Bonilla. TNK treatment was discussed to patient's family by ED physician, and was administered at 21:38. Patient got admitted to ICU for post TNK management and close hemodynamic monitoring. On ICU arrival, patient is awake, alert, and oriented but still mute, unable to communicate with global aphasia. But able to write and nods head to communicate. Daughter at bedside stated that the patient's stroke symptoms has improvement. Earlier patient cannot lift up her both upper extremities and not following commands. But now patient able to follow commands, able to lift all  evaluation, Reviewed signs, symptoms and risks of aspiration, Reviewed ST goals and Plan of Care, Reviewed recommendations for follow-up, Education Related to Potential Risks and Complications Due to Impairment/Illness/Injury, Education Related to Prevention of Recurrence of Impairment/Illness/Injury, and Education Related to Stroke Diagnosis  Evaluation of Education: Verbalizes understanding, Demonstrates with assistance, and Needs further instruction    PLAN:  Recommendations pending MBS  Skilled SLP intervention on acute care 3-5 x per week or until goals met and/or pt plateaus in function.  Specific interventions for next session may include: expressive/receptive language tasks.    PATIENT GOAL:    Did not state.  Will further assess during treatment.    SHORT TERM GOALS:  Short Term Goals  Time Frame for Short Term Goals: 2 weeks  Goal 1: Complete instrumental evaluation via MBS for further diagnostic assessment of pharyngeal integrity.  Goal 2: Patient will execute multi-step commands, answer complex yes/no questions, and complete higher level functional comprehension tasks with 90% accuracy, min cues to improve contributions within immediate context.  Goal 3: Patient will engage in multi-modal communication efforts (e.g, communication board, handwriting, iPad/iPhone applications) with appropriate processing speed and accurate selections/transcriptions to assist in conveying desired message.  Goal 4: Patient will approximate basic verbalizations (vowels, grunts, clicks, hums, coughs, etc) with 25% accuracy, mod cues to assist with establishment for foundation of verbal expression.  Goal 5: Complete alternate cognitive linguistic evaluation via MOCA as it becomes clinically indicated.    LONG TERM GOALS:  No LTG established given short JEYSON Mao M.A., CCC-SLP [id number]

## 2024-01-03 NOTE — ED PROVIDER NOTES
History:   Procedure Laterality Date    APPENDECTOMY      DILATION AND CURETTAGE OF UTERUS      ENDOMETRIAL ABLATION  08/13/14    with hysteroscopy, D&C    HYSTERECTOMY      TUBAL LIGATION         CURRENT MEDICATIONS     Previous Medications    ACETAMINOPHEN (TYLENOL) 500 MG TABLET    Take 1 tablet by mouth 4 times daily as needed for Pain    ACETAMINOPHEN (TYLENOL) 500 MG TABLET    Take 2 tablets by mouth every 6 hours as needed for Pain    BUDESONIDE (PULMICORT FLEXHALER) 180 MCG/ACT AEPB INHALER    Inhale 2 puffs into the lungs 2 times daily    IBUPROFEN (ADVIL;MOTRIN) 200 MG TABLET    Take 400 mg by mouth every 6 hours as needed for Pain       ALLERGIES     Allergies   Allergen Reactions    Aspirin Hives and Swelling       FAMILY HISTORY     Family History   Problem Relation Age of Onset    Lung Cancer Maternal Grandmother 67    Cancer Maternal Grandmother     Heart Disease Maternal Grandmother     High Blood Pressure Maternal Grandmother     Asthma Maternal Grandmother     Asthma Mother     Asthma Father     Diabetes Maternal Grandfather     Kidney Disease Maternal Grandfather        SOCIAL HISTORY     Social History     Tobacco Use    Smoking status: Every Day     Current packs/day: 0.50     Average packs/day: 0.5 packs/day for 20.0 years (10.0 ttl pk-yrs)     Types: Cigarettes    Smokeless tobacco: Never   Substance Use Topics    Alcohol use: No    Drug use: No       PHYSICAL EXAM     ED Triage Vitals   BP Temp Temp Source Pulse Respirations SpO2 Height Weight - Scale   01/02/24 2025 01/02/24 2030 01/02/24 2030 01/02/24 2030 01/02/24 2030 01/02/24 2025 -- 01/02/24 2118   (!) 172/116 97.8 °F (36.6 °C) Oral (!) 113 23 91 %  95 kg (209 lb 7 oz)     Body mass index is 35.95 kg/m².     Initial vital signs and nursing assessment reviewed and normal.    Physical Exam  Constitutional:       General: She is not in acute distress.     Appearance: She is ill-appearing. She is not toxic-appearing or diaphoretic.   HENT:  and review of radiographic studies, re-evaluation of patient's condition and review of old charts (Discussed with pharmacy for TNK dosing and administration)    Care discussed with: admitting provider    :     CRITICAL CARE: (None if blank)    DISCHARGE PRESCRIPTIONS: (None if blank)  New Prescriptions    No medications on file         FINAL IMPRESSION     Final diagnoses:   Ischemic stroke (HCC)     1. Ischemic stroke (HCC)        DISPOSITION / PLAN   DISPOSITION Decision To Admit 01/02/2024 09:22:03 PM      OUTPATIENT FOLLOW UP THE PATIENT:  No follow-up provider specified.      This transcription was electronically signed. Parts of this transcriptions may have been dictated by use of voice recognition software and electronically transcribed, and parts may have been transcribed with the assistance of an ED scribe. The transcription may contain errors not detected in proofreading. Please refer to my supervising physician's documentation if my documentation differs.    Electronically Signed: Gerry Bonilla DO, 01/02/24, 9:27 PM         Gerry Bonilla DO  Resident  01/03/24 032

## 2024-01-03 NOTE — H&P
Socioeconomic History    Marital status:    Tobacco Use    Smoking status: Every Day     Current packs/day: 0.50     Average packs/day: 0.5 packs/day for 20.0 years (10.0 ttl pk-yrs)     Types: Cigarettes    Smokeless tobacco: Never   Substance and Sexual Activity    Alcohol use: No    Drug use: No    Sexual activity: Yes     Partners: Male     TOBACCO:   reports that she has been smoking cigarettes. She has a 10.0 pack-year smoking history. She has never used smokeless tobacco.  ETOH:   reports no history of alcohol use.  Drugs:  reports no history of drug use.    Scheduled Meds:   sodium chloride flush  5-40 mL IntraVENous 2 times per day    magnesium sulfate  4,000 mg IntraVENous Once    sodium chloride flush  5-40 mL IntraVENous 2 times per day    atorvastatin  80 mg Oral Nightly    [START ON 1/3/2024] potassium chloride  10 mEq IntraVENous Q1H    [START ON 1/3/2024] pantoprazole  40 mg Oral QAM AC     Continuous Infusions:   sodium chloride 100 mL/hr at 01/02/24 2221    sodium chloride      sodium chloride       PHYSICAL EXAMINATION: 01/02/24    Vital Signs:  BP (!) 154/109   Pulse 93   Temp 97.8 °F (36.6 °C) (Oral)   Resp 18   Wt 95 kg (209 lb 7 oz)   LMP 08/17/2015   SpO2 97%   BMI 35.95 kg/m²      Wt Readings from Last 1 Encounters:   01/02/24 95 kg (209 lb 7 oz)   Body mass index is 35.95 kg/m².    I/O - 24hr:   Intake/Output Summary (Last 24 hours) at 1/2/2024 2314  Last data filed at 1/2/2024 2241  Gross per 24 hour   Intake --   Output 300 ml   Net -300 ml     GCS:   14/15  Oxygen:    Room air    GEN:  -Awake, alert, appearing Frail, elderly female, laying in bed in no apparent distress, cooperative but unable to give adequate history. Appears given age.  EYES: -Pupils are equally round. No vision changes. No scleral erythema, discharge, or conjunctivitis.  HENT: -MM are moist. Oral pharynx without exudates, no evidence of thrush.  NECK: -Supple, no apparent thyromegaly or  masses.  RESP: -LS CTA equal bilat, no wheezes, rales or rhonchi. Symmetric chest movement. No respiratory distress noted.  C/V:  -S1/S2 auscultated. RRR without appreciable M/R/G. No JVD or carotid bruits. Peripheral pulses equal bilaterally and palpable. Peripheral pulses equal bilaterally and palpable. No peripheral edema. No reproducible chest wall tenderness.   GI:  -Central obesity. Abdomen is soft, round, non-distended, no significant tenderness. No masses or guarding. + BS in all quadrants.   :  -Extremal Fowler catheter is present.  LYMPH:  -No palpable cervical lymphadenopathy and no hepatosplenomegaly. No petechiae or ecchymoses.  MS:  -B/L extremities weak muscles strength. Full movements. No gross joint deformities. No swelling, intact sensation symmetrical.   SKIN:  -Normal coloration, warm, dry. No open wounds or ulcers.  PSYC:  -Awake, alert, oriented x 4. Appropriate affect. Limited verbal interaction due to aphasia secondary to stroke.  NEURO:  -CN 2-12 appear grossly intact, normal speech, no lateralizing weakness. No focal deficit. No seizures.  1a  Level of consciousness: 0=alert; keenly responsive   1b. LOC questions:  2=Answers neither question correctly   1c. LOC commands: 0=Performs both tasks correctly   2.  Best Gaze: 0=normal   3. Visual: 0=No visual loss   4. Facial Palsy: 0=Normal symmetric movement   5a. Motor left arm: 2= some effort against gravity    5b.  Motor right arm: 1=Drift, but does not hit bed    6a. motor left le=Drift, but does not hit bed    6b  Motor right le=Drift, but does not hit bed    7. Limb Ataxia: 0=Absent   8.  Sensory: 0=Normal; no sensory loss   9. Best Language:  3=Mute, global aphasia   10. Dysarthria: 0=Normal   11. Extinction and Inattention: 0=No abnormality   12. Distal motor function: 0=Normal     Total:  10         Data: (All radiographs, tracings, PFTs, and imaging are personally viewed and interpreted unless otherwise noted).   BMP

## 2024-01-03 NOTE — ED NOTES
Per Dr. Bonilla, neurologist wants to discuss pros and cons of tx for possible stroke. Pt to ED room 7 in stable condition.

## 2024-01-03 NOTE — PROGRESS NOTES
Stroke Folder given.   What is Stroke/CVA  Signs and Symptoms of stroke (BEFAST)  Treatments for Stroke  Personal Risk Factors for Stroke discussed  Education--Call 222

## 2024-01-03 NOTE — PLAN OF CARE
Integrity - Adult  Goal: Skin integrity remains intact  Outcome: Progressing  Flowsheets (Taken 1/3/2024 0154 by Clare Duke, RN)  Skin Integrity Remains Intact:   Monitor for areas of redness and/or skin breakdown   Assess vascular access sites hourly   Every 4-6 hours minimum: Change oxygen saturation probe site  Note: Ongoing assessment & interventions provided throughout shift.  Skin assessments provided.  Encouraging/assisting patient to turn as needed.       Problem: Musculoskeletal - Adult  Goal: Return mobility to safest level of function  Outcome: Progressing  Flowsheets (Taken 1/3/2024 0154 by Clare Duke, RN)  Return Mobility to Safest Level of Function:   Assess patient stability and activity tolerance for standing, transferring and ambulating with or without assistive devices   Assist with transfers and ambulation using safe patient handling equipment as needed  Note: She moves all extremities and follows commands.     Problem: Genitourinary - Adult  Goal: Absence of urinary retention  Outcome: Progressing     Problem: Metabolic/Fluid and Electrolytes - Adult  Goal: Electrolytes maintained within normal limits  Outcome: Progressing  Flowsheets (Taken 1/3/2024 0154 by Clare Duke, RN)  Electrolytes maintained within normal limits: Monitor labs and assess patient for signs and symptoms of electrolyte imbalances  Note: Labs checked per physician's orders.        Problem: Hematologic - Adult  Goal: Maintains hematologic stability  Outcome: Progressing  Flowsheets (Taken 1/3/2024 0154 by Clare Duke, RN)  Maintains hematologic stability:   Assess for signs and symptoms of bleeding or hemorrhage   Monitor labs for bleeding or clotting disorders  Note: Ongoing assessment & interventions provided throughout shift.  Patient on continuous telemetry monitoring, heart tones, vitals & pulses checked at least 3 times per shift & PRN. Vitals within acceptable limits.  Peripheral pulses palpable.        Problem: Skin/Tissue Integrity  Goal: Absence of new skin breakdown  Description: 1.  Monitor for areas of redness and/or skin breakdown  2.  Assess vascular access sites hourly  3.  Every 4-6 hours minimum:  Change oxygen saturation probe site  4.  Every 4-6 hours:  If on nasal continuous positive airway pressure, respiratory therapy assess nares and determine need for appliance change or resting period.  Outcome: Progressing     Problem: Safety - Adult  Goal: Free from fall injury  Outcome: Progressing     Problem: ABCDS Injury Assessment  Goal: Absence of physical injury  Outcome: Progressing     Problem: Chronic Conditions and Co-morbidities  Goal: Patient's chronic conditions and co-morbidity symptoms are monitored and maintained or improved  Outcome: Progressing    Care plan reviewed with patient.  Patient verbalizes understanding of the care plan and contributed to goal setting.

## 2024-01-03 NOTE — CARE COORDINATION
Case Management Assessment  Initial Evaluation    Date/Time of Evaluation: 1/3/2024 3:06 PM  Assessment Completed by: Danielle Phillips RN    If patient is discharged prior to next notation, then this note serves as note for discharge by case management.    Patient Name: Ivy Ruelas                   YOB: 1980  Diagnosis: CVA (cerebrovascular accident due to intracerebral hemorrhage) (HCC) [I61.9]  Ischemic stroke (HCC) [I63.9]                   Date / Time: 1/2/2024  8:19 PM  Location[facility]     Patient Admission Status: Inpatient   Readmission Risk Low 0-14, Mod 15-19), High > 20: Readmission Risk Score: 12.6    Current PCP: Anjelica Lantigua APRN - CNP  PCP verified by CM? Yes    Chart Reviewed: Yes      History Provided by: Child/Family (Daughter Iwona)  Patient Orientation: Other (see comment) (Expressive aphasia)    Patient Cognition: Alert (expressive aphasia)    Hospitalization in the last 30 days (Readmission):  No    If yes, Readmission Assessment in CM Navigator will be completed.    Advance Directives:      Code Status: Full Code   Patient's Primary Decision Maker is: Legal Next of Kin      Discharge Planning:    Patient lives with: Spouse/Significant Other Type of Home: House  Primary Care Giver: Self  Patient Support Systems include: Spouse/Significant Other, Children, Family Members   Current Financial resources: Medicaid  Current community resources: None  Current services prior to admission: None            Current DME:              Type of Home Care services:  None    ADLS  Prior functional level: Independent in ADLs/IADLs  Current functional level: Other (see comment) (Therapy to eval)    Family can provide assistance at DC: Yes  Would you like Case Management to discuss the discharge plan with any other family members/significant others, and if so, who? No  Plans to Return to Present Housing: Yes  Other Identified Issues/Barriers to RETURNING to current housing:

## 2024-01-03 NOTE — PROGRESS NOTES
[facility]  INPATIENT PHYSICAL THERAPY  EVALUATION  [facility] ICU 4D - 4D-14/014-A    Time In: 1140  Time Out: 1214  Timed Code Treatment Minutes: 24 Minutes  Minutes: 34          Date: 1/3/2024  Patient Name: Ivy Ruelas,  Gender:  female        MRN: [id number]  : 1980  (43 y.o.)      Referring Practitioner: Jenae Ryan APRN - CNP  Diagnosis: CVA (cerebrovascular accident due to intracerebral hemorrhage)  Additional Pertinent Hx: Per H&P: Ivy Ruelas is a 43 y.o. female with past medical history of vertigo, asthma, gout, and MDRO who was presented to [facility] ED after being found unconscious on the floor at work by her co-worker. Reported that when EMS arrived in the scene, the patient was awake and alert but unable to talk or communicate. Patient's last known well was 1-2 hours prior to ED arrival. Stroke alert was activated. CT Head wo contrast shows no acute intracranial abnormality. CTA neck and head w wo contrast shows no hemodynamically significant narrowing of the cervical carotid artery okay or vertebral artery circulation. Neurology was immediately consulted by ED physician, Dr. Bonilla. TNK treatment was discussed to patient's family by ED physician, and was administered at 21:38. Patient got admitted to ICU for post TNK management and close hemodynamic monitoring. MRI negative for infarct 1/3.     Restrictions/Precautions:  Restrictions/Precautions: Fall Risk, General Precautions  Position Activity Restriction  Other position/activity restrictions: expressive aphasia    Subjective:  Chart Reviewed: Yes  Patient assessed for rehabilitation services?: Yes  Family / Caregiver Present: Yes (daughter)  Subjective: RN approved session. Pt agreed for session, her daughter present and supportive. She assisted with communication, pt nodded head and used communication board to communicate.    General:  Overall Orientation Status: Within Functional Limits  Vision: Impaired  Vision

## 2024-01-04 LAB
ANION GAP SERPL CALC-SCNC: 10 MEQ/L (ref 8–16)
BACTERIA SPEC AEROBE CULT: NORMAL
BUN SERPL-MCNC: 11 MG/DL (ref 7–22)
CALCIUM SERPL-MCNC: 9.7 MG/DL (ref 8.5–10.5)
CHLORIDE SERPL-SCNC: 101 MEQ/L (ref 98–111)
CO2 SERPL-SCNC: 26 MEQ/L (ref 23–33)
CREAT SERPL-MCNC: 0.7 MG/DL (ref 0.4–1.2)
DEPRECATED RDW RBC AUTO: 43.4 FL (ref 35–45)
ERYTHROCYTE [DISTWIDTH] IN BLOOD BY AUTOMATED COUNT: 13.3 % (ref 11.5–14.5)
GFR SERPL CREATININE-BSD FRML MDRD: > 60 ML/MIN/1.73M2
GLUCOSE SERPL-MCNC: 108 MG/DL (ref 70–108)
HCT VFR BLD AUTO: 41.4 % (ref 37–47)
HGB BLD-MCNC: 13.8 GM/DL (ref 12–16)
MAGNESIUM SERPL-MCNC: 1.1 MG/DL (ref 1.6–2.4)
MCH RBC QN AUTO: 30 PG (ref 26–33)
MCHC RBC AUTO-ENTMCNC: 33.3 GM/DL (ref 32.2–35.5)
MCV RBC AUTO: 90 FL (ref 81–99)
PLATELET # BLD AUTO: 236 THOU/MM3 (ref 130–400)
PMV BLD AUTO: 10.4 FL (ref 9.4–12.4)
POTASSIUM SERPL-SCNC: 3.6 MEQ/L (ref 3.5–5.2)
RBC # BLD AUTO: 4.6 MILL/MM3 (ref 4.2–5.4)
SODIUM SERPL-SCNC: 137 MEQ/L (ref 135–145)
WBC # BLD AUTO: 17.8 THOU/MM3 (ref 4.8–10.8)

## 2024-01-04 PROCEDURE — 6370000000 HC RX 637 (ALT 250 FOR IP): Performed by: NURSE PRACTITIONER

## 2024-01-04 PROCEDURE — 2580000003 HC RX 258: Performed by: NURSE PRACTITIONER

## 2024-01-04 PROCEDURE — 80048 BASIC METABOLIC PNL TOTAL CA: CPT

## 2024-01-04 PROCEDURE — 6370000000 HC RX 637 (ALT 250 FOR IP)

## 2024-01-04 PROCEDURE — 2060000000 HC ICU INTERMEDIATE R&B

## 2024-01-04 PROCEDURE — 36415 COLL VENOUS BLD VENIPUNCTURE: CPT

## 2024-01-04 PROCEDURE — 97535 SELF CARE MNGMENT TRAINING: CPT

## 2024-01-04 PROCEDURE — 85027 COMPLETE CBC AUTOMATED: CPT

## 2024-01-04 PROCEDURE — 97110 THERAPEUTIC EXERCISES: CPT

## 2024-01-04 PROCEDURE — 99232 SBSQ HOSP IP/OBS MODERATE 35: CPT

## 2024-01-04 PROCEDURE — 97116 GAIT TRAINING THERAPY: CPT

## 2024-01-04 PROCEDURE — 83735 ASSAY OF MAGNESIUM: CPT

## 2024-01-04 RX ORDER — CLOPIDOGREL BISULFATE 75 MG/1
75 TABLET ORAL DAILY
Status: DISCONTINUED | OUTPATIENT
Start: 2024-01-04 | End: 2024-01-06

## 2024-01-04 RX ADMIN — PANTOPRAZOLE SODIUM 40 MG: 40 TABLET, DELAYED RELEASE ORAL at 08:42

## 2024-01-04 RX ADMIN — CLOPIDOGREL BISULFATE 75 MG: 75 TABLET ORAL at 08:42

## 2024-01-04 RX ADMIN — SODIUM CHLORIDE, PRESERVATIVE FREE 10 ML: 5 INJECTION INTRAVENOUS at 08:42

## 2024-01-04 RX ADMIN — SODIUM CHLORIDE, PRESERVATIVE FREE 10 ML: 5 INJECTION INTRAVENOUS at 21:43

## 2024-01-04 RX ADMIN — ATORVASTATIN CALCIUM 80 MG: 80 TABLET, FILM COATED ORAL at 21:43

## 2024-01-04 ASSESSMENT — ENCOUNTER SYMPTOMS
ABDOMINAL PAIN: 0
COUGH: 0
VOMITING: 0
SHORTNESS OF BREATH: 0
NAUSEA: 0

## 2024-01-04 ASSESSMENT — PAIN SCALES - GENERAL: PAINLEVEL_OUTOF10: 0

## 2024-01-04 NOTE — PROGRESS NOTES
Awake- follows commands. Denies any pain, numbness or tingling.  Denies any blurred vision or double vision.  No verbalization but pt able to mouth words, text messages and write notes.    1712- Report called to Laquita ESPINOSA- 4A.   1728- Transferred per wheelchair to 4A-03- belongings sent with pt including glasses and cell phone.

## 2024-01-04 NOTE — NURSE NAVIGATOR
Neuro Nurse Navigator Follow Up    This RN in to provide TNK education to patient and reinforce stroke education previously provided by primary nursing staff. Pt seated in chair next to bed watching tv. She is agreeable to education at this time. TNK education provided. No questions at this time. Stroke education reiterated. No questions at this time. Pt denies any needs or questions. She remains seated in chair with call light in reach.     Patient/family has been educated on their personal risk factors of:  HLD    They have been given hand outs on the following medications:( give handouts/attach to AVS)  Lipitor  TNK    Treatment for stroke includes:  Risk factor modifications  Following the medication regime prescribed by physician      Educated on FAST-Face-Arm-Speech-Time    A stroke is a brain attack.Stroke is a brain injury. It occurs when the brain's blood supply is interrupted. Blood carries oxygen and nutrients to the brain. Without oxygen and nutrients from blood, brain tissue starts to die rapidly. This can happen in less than 10 minutes.    A stroke occurs when blood flow to the brain is blocked (called ischemic stroke). This is caused by one of the following:   Sudden decreased blood flow   Damage to a blood vessel supplying blood to the brain can occur suddenly from either:   Injury   A clot that forms and breaks off from another part of the body (such as the heart or neck)   There are certain conditions which predispose people to form blood clots, such as:   Cancer   Pregnancy   Atrial fibrillation   Certain autoimmune diseases   Local blood clot   A build-up of fatty substances ( atherosclerotic plaque ) along the inner lining of the artery causes:   Narrowing of artery   Reduced elasticity   Local inflammation   Blood protein defects leading to increased clotting tendency   Decreased blood flow in the artery   Clot in an artery supplying the brain   Inflammatory conditions in the blood vessels

## 2024-01-04 NOTE — PROGRESS NOTES
seizure history. They state that patient was not taking any antiplatelets or anticoagulation prior to admission. Family states that patient is allergic to Aspirin to which she breaks out in hives and develops swelling. Patient does smoke at least a half a pack of cigarettes daily as well as vapes. They deny any known history of prior stroke or atrial fibrillation. MRI brain WO obtained this morning and was negative for any acute infarct. Patient denies any headaches, dizziness, nausea, vomiting.    Interval History 1/4/24:  No acute events overnight. On exam today, patient attempts to state her name and utters some sounds. No clear words spoken. Denies additional complaints or concerns. Denies headache, dizziness, nausea, vomiting. No angioedema noted. Repeat CTH 24-hours post TNK negative for acute intracranial abnormalities/hemorrhage.     Review of Systems   Review of Systems   Constitutional:  Negative for fever.   Eyes:  Negative for visual disturbance.   Respiratory:  Negative for cough and shortness of breath.    Cardiovascular:  Negative for chest pain.   Gastrointestinal:  Negative for abdominal pain, nausea and vomiting.   Musculoskeletal:  Negative for arthralgias and myalgias.   Skin:  Negative for rash.   Neurological:  Positive for speech difficulty (mute). Negative for dizziness, facial asymmetry, weakness, light-headedness, numbness and headaches.   Psychiatric/Behavioral:  Negative for confusion.      Medications   Scheduled Meds:    clopidogrel  75 mg Oral Daily    sodium chloride flush  5-40 mL IntraVENous 2 times per day    sodium chloride flush  5-40 mL IntraVENous 2 times per day    atorvastatin  80 mg Oral Nightly    pantoprazole  40 mg Oral QAM AC     Continuous Infusions:    sodium chloride      sodium chloride       PRN Meds: potassium chloride **OR** potassium alternative oral replacement **OR** potassium chloride, sodium chloride flush, sodium chloride, dextrose bolus, sodium chloride  deficit.  Thrombolytic recommendations  Admit to ICU  Bedrest for 8 hours post thrombolytics.  Thrombolytic precautions.  No Lovenox, antiplatelets, or anticoagulation for the first 24 hours. Please use SCDs for DVT prophylaxis.  Blood pressure recommendations as below.  Neurochecks and NIHSS per thrombolytic order set  Risk factors and medications  Permissive hypertension (up to 180/105) for 24 hours following thrombolytic administration. Following this period, hypertension management with goal blood pressure of <130/80 unless otherwise specified.  Keep well hydrated. Initiate normal saline at 75 ml/hr as needed.  Hold antiplatelets and anticoagulation for minimum of 24 hours.  HgbA1C 5.9. Goal < 7.0 if patient has a history of diabetes.   LDL unable to be obtained due to triglycerides of 611. LDL goal of 45-70. Continue Lipitor 80mg.  Smoking and alcohol cessation when applicable.  Provide stroke eduction for individualized risk factors.  DVT prophylaxis with SCDs (hold heparin/lovenox for 24 hours after IV tPA or IA intervention)  Other recommendations  Dysphagia screen prior to oral intake  PT/OT/SLP Consult  EKG/Telemetry to monitor for atrial fibrillation  NIHSS every shift. Neuro checks per unit unless otherwise specified.  Head of bed 45 degree.    4 hour EEG: negative for epileptiform discharges.   WBC elevated at 17.8. Recommend work-up and treatment as applicable per primary team.   Patient has allergy to Aspirin. 24-hour post TNK CTH negative for intracranial hemorrhage. Begin Plavix 75 mg daily. Recommend repeat MRI brain WO in 3 weeks to further assess for a small infarct, which may have been missed on initial MRI. Outpatient neurology follow-up with Dr. Christensen in 1 month. Continue Plavix until cleared by a neurologist, pending repeat MRI. If negative, may consider discontinuing Plavix at that time.   Okay for transfer to neuro stepdown at this time.   Neurology following. Please reach out with any

## 2024-01-04 NOTE — CARE COORDINATION
01/04/24 3:25 PM    Spoke with April and her daughter regarding recommendations for rehab at discharge. Explained options of IPR vs SNF and what each offers. She is considering IPR. Reported options of [facility] and [facility] being the nearest facilities. April wrote that she wanted her daughter to call (April's)  and talk to him about it. They will decide. CM to check back or also told them if they decide they want IPR, to let primary RN know and she will call and update CM. Primary RN, Meme updated.

## 2024-01-04 NOTE — PROGRESS NOTES
[facility]  INPATIENT PHYSICAL THERAPY  DAILY NOTE  [facility] ICU 4D - 4D-14/014-A    Time In: 1014  Time Out: 1045  Timed Code Treatment Minutes: 31 Minutes  Minutes: 31          Date: 2024  Patient Name: Ivy Ruelas,  Gender:  female        MRN: [id number]  : 1980  (43 y.o.)     Referring Practitioner: Jenae Ryan APRN - CNP  Diagnosis: CVA (cerebrovascular accident due to intracerebral hemorrhage)  Additional Pertinent Hx: Per H&P: Ivy Ruelas is a 43 y.o. female with past medical history of vertigo, asthma, gout, and MDRO who was presented to [facility] ED after being found unconscious on the floor at work by her co-worker. Reported that when EMS arrived in the scene, the patient was awake and alert but unable to talk or communicate. Patient's last known well was 1-2 hours prior to ED arrival. Stroke alert was activated. CT Head wo contrast shows no acute intracranial abnormality. CTA neck and head w wo contrast shows no hemodynamically significant narrowing of the cervical carotid artery okay or vertebral artery circulation. Neurology was immediately consulted by ED physician, Dr. Bonilla. TNK treatment was discussed to patient's family by ED physician, and was administered at 21:38. Patient got admitted to ICU for post TNK management and close hemodynamic monitoring. MRI negative for infarct 1/3.     Prior Level of Function:  Lives With: Family (spouse, son and grandson)  Type of Home: House  Home Layout: Two level, Able to Live on Main level with bedroom/bathroom  Home Access: Stairs to enter with rails  Entrance Stairs - Number of Steps: 3  Entrance Stairs - Rails:  (uni rail)        Receives Help From: Family  ADL Assistance: Independent  Homemaking Assistance: Independent  Ambulation Assistance: Independent  Transfer Assistance: Independent  Active : No  Additional Comments: Daughter reports Pt was indep PLOF without use of  entry  Short Term Goal 5: Pt to improve Tinetti score to >=19/28 to progress to the moderate fall risk category  Long Term Goals  Time Frame for Long Term Goals : NA due to short ELOS    Following session, patient left in safe position with all fall risk precautions in place.

## 2024-01-04 NOTE — PROCEDURES
Date: 1/3/2023  Referring physician: Bennie Jean MD    Indication  Patient aged 43 y with possible seizures. EEG done to assess for epileptiform activity.    Introduction  This 5 hrs vEEG was recorded using the [employer] 10-20 System on a [employer] workstation at 256 samples/s. Automated spike and seizure detection algorithms were applied.    Description  During the maximal alert state, a well-regulated, symmetric, and reactive 9 Hz posterior dominant rhythm was seen. No consistent focal slowing or interhemispheric asymmetry was noted. Stage I and stage II sleep were observed. There were no interictal epileptiform discharges or electrographic seizures.    Activations  Hyperventilation was not performed. Intermittent photic stimulation was performed and demonstrated no posterior driving response.    Impression  Normal awake and sleep EEG.       EKG lead did not show clear arrhythmia, if still in concern consider formal EKG or correlation with telemetry.       No epileptiform discharges were identified. Please note the absence of such activity on this record cannot conclusively rule out an epileptic disorder. If such is still clinically suspected, a repeat study with sleep deprivation and/or prolonged sampling may be helpful.    Bennie Gould MD  Epilepsy Board Certified.  Neurology Board Certified.    Electronically Signed

## 2024-01-04 NOTE — PROGRESS NOTES
[facility]  STRZ ICU 4D  Occupational Therapy  Daily Note  Time:    Time In: 1111  Time Out: 1134  Timed Code Treatment Minutes: 23 Minutes  Minutes: 23          Date: 2024  Patient Name: Ivy Ruelas   Gender: female      Room: 4D-14/014-A  MRN: [id number]  : 1980  (43 y.o.)  Referring Practitioner: Jenae Ryan APRN - CNP  Diagnosis: CVA  Additional Pertinent Hx: 43 y.o. female with a history of asthma, gout, IBS, and vertigo who presented to [facility] ED yesterday  with the chief complaint of altered mental status and unresponsiveness. 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 Per ED chart review patient was minimally responsive in the ED. She was arousable to voice and able to follow commands. Initial NIH was screened in the ED by ED provider and was found to be 17. Patient was aphasic, had BUE drift, minimal resistance in BLE. Initial glucose was 253. Blood pressure in the ED was 172/116. Stat CT head obtained and was negative for any acute hemorrhage. Dr. Jean neurointerventionalist was contacted by the ED provider and case was discussed in detail. Given patient presented within the time window to receive TNK and had no contraindications to TNK it was administered at 2138. Stat CTA head and neck obtained and was negative for LVO. Given this patient was deemed not a candidate for neurointervention. Patient was then admitted to the ICU for close monitoring. When patient arrived to ICU her NIH had improved to a 10 - 2 for LOC questions, 2 for LUE drift, 1 for RUE drift, 1 for RLE drift, 1 for LLE drift, and 3 for best language. No acute events happened overnight with the patient and no angioedema documented. On examination of patient this morning she is able to follow commands.    Restrictions/Precautions:  Restrictions/Precautions: Fall Risk, General Precautions  Position Activity Restriction  Other  position/activity restrictions: expressive aphasia      SUBJECTIVE: up in recliner, agreeable to getting sponge bath & brushing teeth; Pt did not verbalize during session but showed more initiation with using gestures & head nods to communicate    PAIN: 0/10: no c/o HA today     Vitals: Blood Pressure: 125/85  Oxygen: 97%  Heart Rate: 87    COGNITION: Expressive Aphasia    ADL:   Grooming: Contact Guard Assistance.  -SBA for brushing teeth while standing at sink, able to sequence without vs  Bathing: Contact Guard Assistance.  Sponge bath completed while standing at sink; declined washing lower legs & feet (fatigued & wanting to rest)  Upper Extremity Dressing: Maximum Assistance.  For [facility] go  Toileting: Contact Guard Assistance.     Toilet Transfer: Contact Guard Assistance. STS .    BALANCE:  Standing Balance: Contact Guard Assistance. -SBA    BED MOBILITY:  Not Tested    TRANSFERS:  Sit to Stand:  Contact Guard Assistance.    Stand to Sit: Contact Guard Assistance.      FUNCTIONAL MOBILITY:  Assistive Device: None  Assist Level:  Contact Guard Assistance.   Distance: To and from bathroom        Modified Basil Scale:  +4 - Moderately severe disability; unable to walk and attend to bodily needs without assistance    ASSESSMENT:     Activity Tolerance:  Patient tolerance of  treatment: Fair treatment tolerance       Discharge Recommendations: Continue to assess pending progress  Equipment Recommendations: Other: monitor pending progress  Plan: Times Per Week: 6x  Current Treatment Recommendations: Functional mobility training, Balance training, Endurance training, Safety education & training, Patient/Caregiver education & training, Self-Care / ADL    Education:  Learners: Patient  Safety with ADLs    Goals  Short Term Goals  Time Frame for Short Term Goals: until discharge  Short Term Goal 1: Pt will complete various t/fs including toilet with SBA & 0-2 vcs for safety  Short Term Goal 2: Pt will

## 2024-01-04 NOTE — PLAN OF CARE
Patient was admitted secondary to being found down while at work, exhibited bilateral upper extremity and lower extremity weakness, and had mutism/global aphasia. There was concern for CVA and therefore decision was made in ED with family members to administer TNK - after which chart review states symptoms appeared to improve.    MRI of the brain was completed revealing no acute infarct. Suspicion for potential small infarct that was missed on MRI and therefore neurology recommends repeating an outpatient MRI in 3 weeks time.    Patient is s/p 24 hours from her TNK administration which was at 2138 01/02/2023 -continue on Plavix indefinitely per neurology recommendations.    Noted to have chronic leukocytosis.    Hospitalist will continue to follow    Electronically signed by Ophelia Alberto PA-C on 1/4/2024 at 6:09 PM

## 2024-01-05 ENCOUNTER — APPOINTMENT (OUTPATIENT)
Dept: MRI IMAGING | Age: 44
End: 2024-01-05
Payer: COMMERCIAL

## 2024-01-05 PROBLEM — R47.01 MUTISM: Status: ACTIVE | Noted: 2024-01-05

## 2024-01-05 LAB
DEPRECATED RDW RBC AUTO: 44.5 FL (ref 35–45)
ERYTHROCYTE [DISTWIDTH] IN BLOOD BY AUTOMATED COUNT: 13.3 % (ref 11.5–14.5)
HCT VFR BLD AUTO: 42.9 % (ref 37–47)
HGB BLD-MCNC: 14 GM/DL (ref 12–16)
MAGNESIUM SERPL-MCNC: 1.1 MG/DL (ref 1.6–2.4)
MCH RBC QN AUTO: 29.8 PG (ref 26–33)
MCHC RBC AUTO-ENTMCNC: 32.6 GM/DL (ref 32.2–35.5)
MCV RBC AUTO: 91.3 FL (ref 81–99)
PLATELET # BLD AUTO: 227 THOU/MM3 (ref 130–400)
PMV BLD AUTO: 10.5 FL (ref 9.4–12.4)
RBC # BLD AUTO: 4.7 MILL/MM3 (ref 4.2–5.4)
WBC # BLD AUTO: 17.6 THOU/MM3 (ref 4.8–10.8)

## 2024-01-05 PROCEDURE — 97535 SELF CARE MNGMENT TRAINING: CPT

## 2024-01-05 PROCEDURE — 83735 ASSAY OF MAGNESIUM: CPT

## 2024-01-05 PROCEDURE — 97110 THERAPEUTIC EXERCISES: CPT

## 2024-01-05 PROCEDURE — 99232 SBSQ HOSP IP/OBS MODERATE 35: CPT

## 2024-01-05 PROCEDURE — 85027 COMPLETE CBC AUTOMATED: CPT

## 2024-01-05 PROCEDURE — 2060000000 HC ICU INTERMEDIATE R&B

## 2024-01-05 PROCEDURE — 6370000000 HC RX 637 (ALT 250 FOR IP)

## 2024-01-05 PROCEDURE — 97530 THERAPEUTIC ACTIVITIES: CPT

## 2024-01-05 PROCEDURE — 97112 NEUROMUSCULAR REEDUCATION: CPT

## 2024-01-05 PROCEDURE — 36415 COLL VENOUS BLD VENIPUNCTURE: CPT

## 2024-01-05 PROCEDURE — 97116 GAIT TRAINING THERAPY: CPT

## 2024-01-05 PROCEDURE — 6370000000 HC RX 637 (ALT 250 FOR IP): Performed by: NURSE PRACTITIONER

## 2024-01-05 PROCEDURE — 2580000003 HC RX 258: Performed by: NURSE PRACTITIONER

## 2024-01-05 PROCEDURE — 92507 TX SP LANG VOICE COMM INDIV: CPT

## 2024-01-05 PROCEDURE — 99232 SBSQ HOSP IP/OBS MODERATE 35: CPT | Performed by: PHYSICIAN ASSISTANT

## 2024-01-05 PROCEDURE — 70551 MRI BRAIN STEM W/O DYE: CPT

## 2024-01-05 PROCEDURE — 92526 ORAL FUNCTION THERAPY: CPT

## 2024-01-05 PROCEDURE — 99222 1ST HOSP IP/OBS MODERATE 55: CPT | Performed by: STUDENT IN AN ORGANIZED HEALTH CARE EDUCATION/TRAINING PROGRAM

## 2024-01-05 PROCEDURE — 6360000002 HC RX W HCPCS: Performed by: PHYSICIAN ASSISTANT

## 2024-01-05 RX ORDER — MAGNESIUM SULFATE IN WATER 40 MG/ML
2000 INJECTION, SOLUTION INTRAVENOUS PRN
Status: DISCONTINUED | OUTPATIENT
Start: 2024-01-05 | End: 2024-01-06 | Stop reason: HOSPADM

## 2024-01-05 RX ADMIN — MAGNESIUM SULFATE HEPTAHYDRATE 2000 MG: 40 INJECTION, SOLUTION INTRAVENOUS at 08:23

## 2024-01-05 RX ADMIN — PANTOPRAZOLE SODIUM 40 MG: 40 TABLET, DELAYED RELEASE ORAL at 08:18

## 2024-01-05 RX ADMIN — MAGNESIUM SULFATE HEPTAHYDRATE 2000 MG: 40 INJECTION, SOLUTION INTRAVENOUS at 10:27

## 2024-01-05 RX ADMIN — ATORVASTATIN CALCIUM 80 MG: 80 TABLET, FILM COATED ORAL at 20:21

## 2024-01-05 RX ADMIN — CLOPIDOGREL BISULFATE 75 MG: 75 TABLET ORAL at 08:18

## 2024-01-05 RX ADMIN — SODIUM CHLORIDE, PRESERVATIVE FREE 10 ML: 5 INJECTION INTRAVENOUS at 20:21

## 2024-01-05 RX ADMIN — SODIUM CHLORIDE, PRESERVATIVE FREE 10 ML: 5 INJECTION INTRAVENOUS at 08:19

## 2024-01-05 ASSESSMENT — ENCOUNTER SYMPTOMS
ABDOMINAL PAIN: 0
COUGH: 0
SHORTNESS OF BREATH: 0
VOMITING: 0
NAUSEA: 0

## 2024-01-05 ASSESSMENT — PAIN SCALES - GENERAL
PAINLEVEL_OUTOF10: 0

## 2024-01-05 NOTE — PLAN OF CARE
Problem: Discharge Planning  Goal: Discharge to home or other facility with appropriate resources  Outcome: Progressing  Flowsheets (Taken 1/4/2024 2023)  Discharge to home or other facility with appropriate resources:   Identify barriers to discharge with patient and caregiver   Arrange for needed discharge resources and transportation as appropriate   Identify discharge learning needs (meds, wound care, etc)     Problem: Pain  Goal: Verbalizes/displays adequate comfort level or baseline comfort level  Outcome: Progressing  Flowsheets (Taken 1/4/2024 2023)  Verbalizes/displays adequate comfort level or baseline comfort level:   Encourage patient to monitor pain and request assistance   Assess pain using appropriate pain scale   Administer analgesics based on type and severity of pain and evaluate response   Implement non-pharmacological measures as appropriate and evaluate response     Problem: Neurosensory - Adult  Goal: Achieves stable or improved neurological status  Outcome: Progressing  Flowsheets (Taken 1/4/2024 2023)  Achieves stable or improved neurological status: Assess for and report changes in neurological status     Problem: Respiratory - Adult  Goal: Achieves optimal ventilation and oxygenation  Outcome: Progressing  Flowsheets (Taken 1/4/2024 2023)  Achieves optimal ventilation and oxygenation:   Assess for changes in respiratory status   Assess for changes in mentation and behavior   Position to facilitate oxygenation and minimize respiratory effort   Oxygen supplementation based on oxygen saturation or arterial blood gases   Assess the need for suctioning and aspirate as needed   Assess and instruct to report shortness of breath or any respiratory difficulty   Respiratory therapy support as indicated     Problem: Cardiovascular - Adult  Goal: Maintains optimal cardiac output and hemodynamic stability  Outcome: Progressing  Flowsheets (Taken 1/4/2024 2023)  Maintains optimal cardiac output and

## 2024-01-05 NOTE — PROGRESS NOTES
[facility]  STRZ NEUROSCIENCES 4A  Occupational Therapy  Daily Note  Time:    Time In: 08  Time Out: 09  Timed Code Treatment Minutes: 35 Minutes  Minutes: 35          Date: 2024  Patient Name: Ivy Ruelas   Gender: female      Room: -03/003-A  MRN: [id number]  : 1980  (43 y.o.)  Referring Practitioner: Jenae Ryan APRN - CNP  Diagnosis: CVA  Additional Pertinent Hx: 43 y.o. female with a history of asthma, gout, IBS, and vertigo who presented to [facility] ED yesterday  with the chief complaint of altered mental status and unresponsiveness. 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 Per ED chart review patient was minimally responsive in the ED. She was arousable to voice and able to follow commands. Initial NIH was screened in the ED by ED provider and was found to be 17. Patient was aphasic, had BUE drift, minimal resistance in BLE. Initial glucose was 253. Blood pressure in the ED was 172/116. Stat CT head obtained and was negative for any acute hemorrhage. Dr. Jean neurointerventionalist was contacted by the ED provider and case was discussed in detail. Given patient presented within the time window to receive TNK and had no contraindications to TNK it was administered at 2138. Stat CTA head and neck obtained and was negative for LVO. Given this patient was deemed not a candidate for neurointervention. Patient was then admitted to the ICU for close monitoring. When patient arrived to ICU her NIH had improved to a 10 - 2 for LOC questions, 2 for LUE drift, 1 for RUE drift, 1 for RLE drift, 1 for LLE drift, and 3 for best language. No acute events happened overnight with the patient and no angioedema documented. On examination of patient this morning she is able to follow commands.    Restrictions/Precautions:  Restrictions/Precautions: Fall Risk, General Precautions  Position Activity Restriction  Other  position/activity restrictions: expressive aphasia      SUBJECTIVE: agreeable to grooming, declined BADL d/t unable to shower d/t IV running (therapist had talked to Pt on previous day about possibly showering today)  **Pt quick to use gestures & head nods to communicate, but not verbalization on this date. Affect continues to be very flat.     PAIN: denies pain/10:      Vitals: Blood Pressure: 121/79  **reports dizziness with prolonged standing, BP after ambulation in hallway 130/90, HR 91 & O2 sat 99% on RA    COGNITION: Slow Processing    ADL:   Grooming: Contact Guard Assistance.  -SBA; stood at sink to brush teeth  Footwear Management: with set-up.  To don slipper socks while seated EOB  Toileting: Contact Guard Assistance.     Toilet Transfer: Stand By Assistance. STS .    BALANCE:  Sitting Balance:  Independent.    Standing Balance: Contact Guard Assistance. -CGA    BED MOBILITY:  Supine to Sit: Stand By Assistance with HOB elevated    TRANSFERS:  Sit to Stand:  Contact Guard Assistance.    Stand to Sit: Stand By Assistance.      FUNCTIONAL MOBILITY:  Assistive Device: None  Assist Level:  Contact Guard Assistance.   Distance: To and from bathroom and + 40ft in hallway (used RW in hallway, noted Pt furniture walking in room)        ADDITIONAL ACTIVITIES:  Pt able to write name on board with extra time & wrote 1 leisure task after asked then wrote dog's name on board after asked about pets at home.     AM-[facility] Inpatient Daily Activity Raw Score: 21  AM-[facility] Inpatient ADL T-Scale Score : 44.27  ADL Inpatient CMS 0-100% Score: 32.79    Modified [place] Scale:  +4 - Moderately severe disability; unable to walk and attend to bodily needs without assistance    ASSESSMENT:     Activity Tolerance:  Patient tolerance of  treatment: Fair treatment tolerance       Discharge Recommendations: Inpatient Rehabilitation  Equipment Recommendations: Other: monitor pending progress  Plan: Times Per Week: 6x  Current Treatment

## 2024-01-05 NOTE — PROGRESS NOTES
Neurology Progress Note    Date:1/5/2024       Room:[facility]003-  Patient Name:Ivy Ruelas     YOB: 1980     Age:43 y.o.    Requesting Physician: Ophelia Alberto PA-C     Reason for Consult:  Evaluate for stroke alert last night      Chief Complaint:   Chief Complaint   Patient presents with    Altered Mental Status       Subjective     Ivy Ruelas is a 43 y.o. female with a history of asthma, gout, IBS, and vertigo, current smoker,  who presented to [facility] ED yesterday with the chief complaint of altered mental status and unresponsiveness. 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 Per ED chart review patient was minimally responsive in the ED. She was arousable to voice and able to follow commands. Initial NIH was screened in the ED by ED provider and was found to be 17. Patient was aphasic, had BUE drift, minimal resistance in BLE. Initial glucose was 253. Blood pressure in the ED was 172/116. Stat CT head obtained and was negative for any acute hemorrhage. Dr. Jean neurointerventionalist was contacted by the ED provider and case was discussed in detail. Given patient presented within the time window to receive TNK and had no contraindications to TNK it was administered at 2138. Stat CTA head and neck obtained and was negative for LVO. Given this patient was deemed not a candidate for neurointervention. Patient was then admitted to the ICU for close monitoring. When patient arrived to ICU her NIH had improved to a 10 - 2 for LOC questions, 2 for LUE drift, 1 for RUE drift, 1 for RLE drift, 1 for LLE drift, and 3 for best language. No acute events happened overnight with the patient and no angioedema documented. On examination of patient this morning she is able to follow commands. No drift noted in any extremity. Patient was still mute and became tearful when attempting to speak. Patient's family at bedside deny any known

## 2024-01-05 NOTE — PROGRESS NOTES
Physical Therapy   [facility]  INPATIENT PHYSICAL THERAPY  DAILY NOTE  [facility] NEUROSCIENC[facility] 4A - 4A-03/003-A    Time In: 1321  Time Out: 1400  Timed Code Treatment Minutes: 39 Minutes  Minutes: 39          Date: 2024  Patient Name: Ivy Ruelas,  Gender:  female        MRN: [id number]  : 1980  (43 y.o.)     Referring Practitioner: Jenae Ryan APRN - CNP  Diagnosis: CVA (cerebrovascular accident due to intracerebral hemorrhage)  Additional Pertinent Hx: Per H&P: Ivy Ruelas is a 43 y.o. female with past medical history of vertigo, asthma, gout, and MDRO who was presented to [facility] ED after being found unconscious on the floor at work by her co-worker. Reported that when EMS arrived in the scene, the patient was awake and alert but unable to talk or communicate. Patient's last known well was 1-2 hours prior to ED arrival. Stroke alert was activated. CT Head wo contrast shows no acute intracranial abnormality. CTA neck and head w wo contrast shows no hemodynamically significant narrowing of the cervical carotid artery okay or vertebral artery circulation. Neurology was immediately consulted by ED physician, Dr. Bonilla. TNK treatment was discussed to patient's family by ED physician, and was administered at 21:38. Patient got admitted to ICU for post TNK management and close hemodynamic monitoring. MRI negative for infarct 1/3.     Prior Level of Function:  Lives With: Family (spouse, son and grandson)  Type of Home: House  Home Layout: Two level, Able to Live on Main level with bedroom/bathroom  Home Access: Stairs to enter with rails  Entrance Stairs - Number of Steps: 3  Entrance Stairs - Rails:  (uni rail)        Receives Help From: Family  ADL Assistance: Independent  Homemaking Assistance: Independent  Ambulation Assistance: Independent  Transfer Assistance: Independent  Active : No  Additional Comments: Daughter reports Pt was indep PLOF without use of

## 2024-01-05 NOTE — PROGRESS NOTES
[facility]  INPATIENT SPEECH THERAPY  STRZ NEUROSCIENCES 4A  DAILY NOTE    TIME   SLP Individual Minutes  Time In: 1400  Time Out: 1420  Minutes: 20  Timed Code Treatment Minutes: 0 Minutes  Dysphagia Therapy: 10 minutes   Speech Therapy: 10 minutes        Date: 2024  Patient Name: Ivy Ruelas      CSN: [id number]   : 1980  (43 y.o.)  Gender: female   Referring Physician:   Dyana Rizo MD   Diagnosis: CVA  Precautions: Fall risk   Current Diet: Regular, thin   Respiratory Status: Room Air  Swallowing Strategies:  Full Upright Position, Small Bite/Sip, Pulmonary Monitoring, Oral Care after all Meals, Alternate Solids and Liquids, Limit Distractions, and Monitor for Fatigue    Date of Last MBS/FEES:  MBS 1/3/2024    Pain:  No pain reported.    Subjective:  Spoke with JESÚS Das for approval of ST interventions. Patient alert and cooperative. Patient daughter present at bedside. Ongoing absence for verbal output with deferral to gestural head nods and facial expressions to convey desired message.     Short-Term Goals:  SHORT TERM GOAL #1:  Goal 1: Patient will consume baseline diet of regular solids, thin liquids without pulmonary compromise and with implementation of trained compensatory strategies to assist with nutrition/hydration measures.  INTERVENTIONS: Coarse solid texture trial administered to assist with development of functional oral phase skills, endurance, and to determine appropriateness to continue baseline diet level. Adequate mastication and bolus control evident. Positive employment of lingual sweep to assist with clearance of oral stasis, no overt pocketing exhibited. Thin liquids via straw with generalization for small sips. No overt difficulty exhibited.     Instruction provided to patient re: necessity for implementation of the below identified compensatory strategies to maximize safety for PO intake:  -Full upright positioning  -Small bite/sip  -Alternating  verbalizations.     Patient with high levels of frustration throughout ST session with big sighs and eye rolls when ST asked patient to attempt various vocalizations. All cranial nerves appear intact with no anatomical anomaly evident at bedside. Concerns for etiology of decreased verbal expression given negative brain MRI. Recommend further workup in attempts to determine etiology.       SHORT TERM GOAL #5:  Goal 5: Complete alternate cognitive linguistic evaluation via MOCA as it becomes clinically indicated.  INTERVENTIONS:Not clinically appropriate this date.     Long-Term Goals:  No LTGs established due to short ELOS.       Functional Oral Intake Scale: Total Oral Intake: 7.  Total oral intake with no restrictions    EDUCATION:  Learner: Patient and Family  Education:  Reviewed diet and strategies, Reviewed signs, symptoms and risks of aspiration, Reviewed ST goals and Plan of Care, and Reviewed recommendations for follow-up  Evaluation of Education: Demonstrates with assistance    ASSESSMENT/PLAN:  Activity Tolerance:  Patient tolerance of  treatment: good.      Assessment/Plan: Patient progressing toward established goals.  Continues to require skilled care of licensed speech pathologist to progress toward achievement of established goals and plan of care..     Plan for Next Session: Speech therapy  Discharge Recommendations: Inpatient Rehabilitation     Jazz Melissa MA CFY-SLP [id number]

## 2024-01-05 NOTE — PROGRESS NOTES
[facility]  INPATIENT REHABILITATION  ADMISSIONS COORDINATOR CONSULT    Referral Type: internal    Patient Name: Ivy Ruelas      MRN: [id number]    : 1980  (43 y.o.)  Gender: female   Race:White (non-)     Payor Source: Payor: [employer] PLAN / Plan: [employer] PLAN / Product Type: *No Product type* /   Secondary Payor Source:      Isolation Status: No active isolations    Lives With: Family (spouse, son and grandson)  Type of Home: House  Home Layout: Two level, Able to Live on Main level with bedroom/bathroom  Home Access: Stairs to enter with rails  Entrance Stairs - Number of Steps: 3  Receives Help From: Family  Occupation: Full time employment  Type of Occupation: [place]  Additional Comments: Daughter reports Pt was indep PLOF without use of AD.    What is treatment plan?  Disciplines Required upon Admission to Inpatient Rehabilitation: Physical Therapy, Occupational Therapy, and Speech Therapy  Post operative: No  Fall: No  Dialysis: No  Diet: ADULT DIET; Regular  Discussed patient with  and PM&R provider: Discussed with BEN Osborn question the WBC elevation.  Will discuss this patient in PM&R collaborative at 0845.    Update:  0920 Rounded on unit, Spoke with Primary RN.  Spoke with patient, patient is unable to speak. Communicated with patient through text message and nonverbal gestures.

## 2024-01-05 NOTE — PLAN OF CARE
Problem: Discharge Planning  Goal: Discharge to home or other facility with appropriate resources  1/5/2024 1146 by Pippa Mustafa, RN  Outcome: Progressing  Flowsheets (Taken 1/5/2024 1146)  Discharge to home or other facility with appropriate resources:   Identify barriers to discharge with patient and caregiver   Arrange for needed discharge resources and transportation as appropriate   Identify discharge learning needs (meds, wound care, etc)   Arrange for interpreters to assist at discharge as needed   Refer to discharge planning if patient needs post-hospital services based on physician order or complex needs related to functional status, cognitive ability or social support system     Problem: Pain  Goal: Verbalizes/displays adequate comfort level or baseline comfort level  1/5/2024 1146 by Pippa Mustafa, RN  Outcome: Progressing  Flowsheets (Taken 1/5/2024 1146)  Verbalizes/displays adequate comfort level or baseline comfort level:   Encourage patient to monitor pain and request assistance   Assess pain using appropriate pain scale   Administer analgesics based on type and severity of pain and evaluate response   Implement non-pharmacological measures as appropriate and evaluate response   Consider cultural and social influences on pain and pain management   Notify Licensed Independent Practitioner if interventions unsuccessful or patient reports new pain     Problem: Neurosensory - Adult  Goal: Achieves stable or improved neurological status  1/5/2024 1146 by Pippa Mustafa, RN  Outcome: Progressing  Flowsheets (Taken 1/5/2024 1146)  Achieves stable or improved neurological status: Assess for and report changes in neurological status     Problem: Respiratory - Adult  Goal: Achieves optimal ventilation and oxygenation  1/5/2024 1146 by Pippa Mustafa, RN  Outcome: Progressing  Flowsheets (Taken 1/5/2024 1146)  Achieves optimal ventilation and oxygenation:   Assess for changes in respiratory

## 2024-01-05 NOTE — CONSULTS
intracranial  arterial circulation.     MRI Brain 01/03/2024:  Impression:        1. No evidence of an acute infarct.  2. 2 faint foci of signal hyperintensity are present in the subcortical white matter the right frontal lobe. These are nonspecific. These could be due to prior trauma, prior inflammation, chronic headaches, or early chronic small vessel ischemic changes.     CT Head WO 01/03/2024:  Impression:        1. No evidence of an acute infarct.  2. 2 faint foci of signal hyperintensity are present in the subcortical white matter the right frontal lobe. These are nonspecific. These could be due to prior trauma, prior inflammation, chronic headaches, or early chronic small vessel ischemic changes.       Impression:  Concern for CVA  Mutism  Hypokalemia  Hypomagnesemia  History of asthma  History of gout    Recommendations:  Functional Status: patient is CGA/SBA with mobility; significant expressive language impairment, however, receptive language appears intact   Plan, per neurology, is for repeat MRI brain with thin cuts now and reconsider repeat MRI brain WO in 3 weeks. Additionally planning for repeat 8hour EEG when a monitor is available  Noted psychiatry consult. If all neurologic work up is negative could have a conversion component?   From a rehab perspective, patient likely does not qualify for agressive inpatient rehab due to lack of medical complexity and current therapy needs.   Will benefit from ongoing therapies while in house and then on DC  Patient and family both report feeling confident that they can provide the level of physical assistance currently needed at home. Can also provide 24/7 assist.  Patient has made good adaptations to be nathaniel to effectively communicate with use of phone and text. We did discuss recommendation for a life alert on discharge.  Please feel free to reach back out if any changes in functional status    It was my pleasure to evaluate Ivy Ruelas today.  Please

## 2024-01-05 NOTE — CARE COORDINATION
1/5/24, 2:58 PM EST    DISCHARGE ON GOING EVALUATION    April D [facility] day: 3  Location: -03/003-A Reason for admit: CVA (cerebrovascular accident due to intracerebral hemorrhage) (HCC) [I61.9]  Ischemic stroke (HCC) [I63.9]   Procedure:   1/2 CT Head: No acute findings  1/2 CTA Head: No hemodynamically significant narrowing of the major intracranial   arterial circulation  1/2 CTA Neck: No hemodynamically significant narrowing of the cervical carotid artery   or vertebral artery circulation  1/2 TNK given  1/3 MRI Brain: No evidence of an acute infarct; 2 faint foci of signal hyperintensity are present in the subcortical white matter the right frontal lobe. These are nonspecific. These could be due to prior trauma, prior inflammation, chronic headaches, or early chronic small vessel ischemic changes.  1/3 MBS: No laryngeal penetration or aspiration of barium   1/3 CXR: No acute process  1/3 CT Head WO Contrast No acute intracranial abnormality. No acute intraparenchymal hemorrhage.   Barriers to Discharge: PT/OT/SLP, Physiatry following, WBC 17.6, electrolyte replacement protocols, Regular diet. Neurology following, Lipitor, Plavix, Protonix.   PCP: Anjelica Lantigua APRN - CNP  Readmission Risk Score: 6.1%  Patient Goals/Plan/Treatment Preferences: From home with family. Will follow.

## 2024-01-05 NOTE — PROGRESS NOTES
Stroke Core Measure Documentation    Confirmed ischemic stroke?  Yes  TNK (thrombolytic therapy)? Yes  LDL level:  N/A  Date drawn: 1/3  VTE prophylaxis within 24 hours?  Yes - SCDs  Antithrombotic by end of hospital day 2? Yes-Plavix; allergy to aspirin  Anticoagulation therapy for AFib/flutter? N/A  Current statin dose:  Atorvastatin 80mg  Rehab consult? Yes  Stroke Education? Yes    Discharge indicators:  Patient to be discharged on statin (if LDL > 100 or if on prior statin)  Patient to be discharged on antithrombotic therapy (ASA, Plavix or combination)

## 2024-01-05 NOTE — CARE COORDINATION
01/05/24 6:57 AM    BEN had voicemail this morning that was left late yesterday afternoon by April's primary RN, Meme; states patient has decided that she would prefer to go to [facility]. BEN obtained order for Physiatry consult.     Pt transferred to 4A03. Handoff report given to JOSE ALEJANDRO Osborn CM.

## 2024-01-05 NOTE — PROGRESS NOTES
Hospitalist Progress Note      Patient:  Ivy Ruelas    Unit/Bed:4A-03/003-A  YOB: 1980  MRN: [id number]   Acct: [id number]   PCP: Anjelica Lantigua APRN - CNP  Date of Admission: 1/2/2024    Assessment/Plan:    Stroke like symptoms / mutism, unclear etiology:  S/p TNK 1/2 2138 - monitored in ICU and transferred out 1/4  Neuro consulted and following  CTH neg. Repeat CTH following TNK also neg. MRI brain neg for acute infarct - will need repeat MRI brain in 3 weeks as for evaluation  CTA head / neck without LVO - no indication for carotid US  4 hour EEG neg for epileptiform activity   Continue statin, cleared now for antiplatelets as s/p 24 hours TNK (allergy to ASA), charlie Plavix 75 mg daily until f/u with outpatient Neuro per their recommendations  PT/OT/SLP  Neuro checks   Psych consulted for eval   Hypomagnesemia: Mag noted at 1.1, replete per protocol and repeat in am  Leukocytosis: WBC is chronically elevated, unclear etiology. Pt has never seen a hematologist. Denies any infectious s/s. Cont to monitor. Referral to Heme/Onc at discharge.   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 female with past medical history of vertigo, asthma, gout, and MDRO who was presented to [facility] ED after being found unconscious on the floor at work by her co-worker. Reported that when EMS arrived in the scene, the patient was awake and alert but unable to talk or communicate. Patient's last known well was 1-2 hours prior to ED arrival. Stroke alert was activated. CT Head wo contrast shows no acute intracranial abnormality. CTA neck and head w wo contrast shows no hemodynamically significant narrowing of the cervical carotid artery okay or vertebral artery circulation. Neurology was immediately consulted by ED physician,   achievable.      Carotid stenosis and measurements are in accordance with NASCET criteria.      3D Post-processing was performed on this study.      CT HEAD WO CONTRAST   Final Result   1. No acute intracranial abnormality.      This document has been electronically signed by: Darrell Lugo MD on    01/02/2024 08:53 PM      All CTs at this facility use dose modulation techniques and iterative    reconstructions, and/or weight-based dosing   when appropriate to reduce radiation to a low as reasonably achievable.        CT head without contrast    Result Date: 1/4/2024  CT Head without contrast Indication: CVA. Post TNK. Technique: CT head without contrast. Coronal and sagittal reformations. Comparison: CT/SR - CT HEAD WO CONTRAST - 01/02/2024 08:38 PM EST Findings: The ventricular system is midline in position. No acute major vessel vascular territory infarct. No acute intraparenchymal hemorrhage. No parenchymal mass lesions. No abnormal extra-axial fluid collections. The calvarium is intact. The mastoid air cells and visualized paranasal sinuses are well-aerated.     Impression: No acute intracranial abnormality. No acute intraparenchymal hemorrhage. This document has been electronically signed by: Davis Helton MD on 01/04/2024 12:10 AM All CTs at this facility use dose modulation techniques and iterative reconstructions, and/or weight-based dosing when appropriate to reduce radiation to a low as reasonably achievable.    Echo (TTE) complete (PRN contrast/bubble/strain/3D)    Result Date: 1/3/2024    Left Ventricle: Moderately reduced left ventricular systolic function with a visually estimated EF of 45%. Left ventricle size is normal. Normal wall thickness. Moderate global hypokinesis present.     FL MODIFIED BARIUM SWALLOW W VIDEO    Result Date: 1/3/2024  PROCEDURE: FL MODIFIED BARIUM SWALLOW W VIDEO CLINICAL INFORMATION: Dysphasia TECHNIQUE: Fluoroscopy was provided for modified barium swallowing study performed

## 2024-01-06 VITALS
TEMPERATURE: 98.1 F | OXYGEN SATURATION: 98 % | DIASTOLIC BLOOD PRESSURE: 91 MMHG | HEART RATE: 92 BPM | RESPIRATION RATE: 16 BRPM | SYSTOLIC BLOOD PRESSURE: 118 MMHG | HEIGHT: 64 IN | WEIGHT: 181 LBS | BODY MASS INDEX: 30.9 KG/M2

## 2024-01-06 LAB
ANION GAP SERPL CALC-SCNC: 13 MEQ/L (ref 8–16)
BUN SERPL-MCNC: 17 MG/DL (ref 7–22)
CALCIUM SERPL-MCNC: 9.1 MG/DL (ref 8.5–10.5)
CHLORIDE SERPL-SCNC: 100 MEQ/L (ref 98–111)
CO2 SERPL-SCNC: 25 MEQ/L (ref 23–33)
CREAT SERPL-MCNC: 0.8 MG/DL (ref 0.4–1.2)
DEPRECATED RDW RBC AUTO: 43.9 FL (ref 35–45)
ERYTHROCYTE [DISTWIDTH] IN BLOOD BY AUTOMATED COUNT: 13.2 % (ref 11.5–14.5)
GFR SERPL CREATININE-BSD FRML MDRD: > 60 ML/MIN/1.73M2
GLUCOSE SERPL-MCNC: 121 MG/DL (ref 70–108)
HCT VFR BLD AUTO: 42 % (ref 37–47)
HGB BLD-MCNC: 13.8 GM/DL (ref 12–16)
MAGNESIUM SERPL-MCNC: 1.3 MG/DL (ref 1.6–2.4)
MCH RBC QN AUTO: 29.8 PG (ref 26–33)
MCHC RBC AUTO-ENTMCNC: 32.9 GM/DL (ref 32.2–35.5)
MCV RBC AUTO: 90.7 FL (ref 81–99)
PLATELET # BLD AUTO: 262 THOU/MM3 (ref 130–400)
PMV BLD AUTO: 10.8 FL (ref 9.4–12.4)
POTASSIUM SERPL-SCNC: 3.5 MEQ/L (ref 3.5–5.2)
RBC # BLD AUTO: 4.63 MILL/MM3 (ref 4.2–5.4)
SODIUM SERPL-SCNC: 138 MEQ/L (ref 135–145)
WBC # BLD AUTO: 19.4 THOU/MM3 (ref 4.8–10.8)

## 2024-01-06 PROCEDURE — 6360000002 HC RX W HCPCS: Performed by: PHYSICIAN ASSISTANT

## 2024-01-06 PROCEDURE — 99239 HOSP IP/OBS DSCHRG MGMT >30: CPT | Performed by: PHYSICIAN ASSISTANT

## 2024-01-06 PROCEDURE — 97535 SELF CARE MNGMENT TRAINING: CPT

## 2024-01-06 PROCEDURE — 6370000000 HC RX 637 (ALT 250 FOR IP)

## 2024-01-06 PROCEDURE — 83735 ASSAY OF MAGNESIUM: CPT

## 2024-01-06 PROCEDURE — 36415 COLL VENOUS BLD VENIPUNCTURE: CPT

## 2024-01-06 PROCEDURE — 80048 BASIC METABOLIC PNL TOTAL CA: CPT

## 2024-01-06 PROCEDURE — 97116 GAIT TRAINING THERAPY: CPT

## 2024-01-06 PROCEDURE — 6370000000 HC RX 637 (ALT 250 FOR IP): Performed by: NURSE PRACTITIONER

## 2024-01-06 PROCEDURE — 85027 COMPLETE CBC AUTOMATED: CPT

## 2024-01-06 PROCEDURE — 97530 THERAPEUTIC ACTIVITIES: CPT

## 2024-01-06 RX ORDER — ATORVASTATIN CALCIUM 80 MG/1
80 TABLET, FILM COATED ORAL NIGHTLY
Qty: 30 TABLET | Refills: 3 | Status: SHIPPED | OUTPATIENT
Start: 2024-01-06

## 2024-01-06 RX ORDER — MAGNESIUM SULFATE IN WATER 40 MG/ML
4000 INJECTION, SOLUTION INTRAVENOUS ONCE
Status: COMPLETED | OUTPATIENT
Start: 2024-01-06 | End: 2024-01-06

## 2024-01-06 RX ORDER — CLOPIDOGREL BISULFATE 75 MG/1
75 TABLET ORAL DAILY
Qty: 30 TABLET | Refills: 3 | Status: SHIPPED | OUTPATIENT
Start: 2024-01-07

## 2024-01-06 RX ADMIN — PANTOPRAZOLE SODIUM 40 MG: 40 TABLET, DELAYED RELEASE ORAL at 06:14

## 2024-01-06 RX ADMIN — MAGNESIUM SULFATE HEPTAHYDRATE 4000 MG: 4 INJECTION, SOLUTION INTRAVENOUS at 07:09

## 2024-01-06 RX ADMIN — ACETAMINOPHEN 650 MG: 325 TABLET ORAL at 08:37

## 2024-01-06 RX ADMIN — CLOPIDOGREL BISULFATE 75 MG: 75 TABLET ORAL at 08:36

## 2024-01-06 ASSESSMENT — PAIN DESCRIPTION - DESCRIPTORS: DESCRIPTORS: ACHING;DISCOMFORT

## 2024-01-06 ASSESSMENT — ENCOUNTER SYMPTOMS
VOMITING: 0
ABDOMINAL PAIN: 0
COUGH: 0
SHORTNESS OF BREATH: 0
NAUSEA: 0

## 2024-01-06 ASSESSMENT — PAIN DESCRIPTION - LOCATION: LOCATION: BACK

## 2024-01-06 ASSESSMENT — PAIN SCALES - GENERAL
PAINLEVEL_OUTOF10: 0
PAINLEVEL_OUTOF10: 3

## 2024-01-06 NOTE — PLAN OF CARE
Problem: Discharge Planning  Goal: Discharge to home or other facility with appropriate resources  1/5/2024 2051 by Radha Merritt RN  Outcome: Progressing  Flowsheets (Taken 1/5/2024 2013)  Discharge to home or other facility with appropriate resources:   Identify barriers to discharge with patient and caregiver   Arrange for needed discharge resources and transportation as appropriate     Problem: Pain  Goal: Verbalizes/displays adequate comfort level or baseline comfort level  1/5/2024 2051 by Radha Merritt, RN  Outcome: Progressing  Flowsheets (Taken 1/5/2024 2051)  Verbalizes/displays adequate comfort level or baseline comfort level:   Encourage patient to monitor pain and request assistance   Assess pain using appropriate pain scale   Administer analgesics based on type and severity of pain and evaluate response   Implement non-pharmacological measures as appropriate and evaluate response     Problem: Neurosensory - Adult  Goal: Achieves stable or improved neurological status  1/5/2024 2051 by Radha Merritt RN  Outcome: Progressing  Flowsheets (Taken 1/5/2024 2013)  Achieves stable or improved neurological status:   Assess for and report changes in neurological status   Maintain blood pressure and fluid volume within ordered parameters to optimize cerebral perfusion and minimize risk of hemorrhage   Initiate measures to prevent increased intracranial pressure     Problem: Respiratory - Adult  Goal: Achieves optimal ventilation and oxygenation  1/5/2024 2051 by Radha Merritt RN  Outcome: Progressing  Flowsheets (Taken 1/5/2024 2013)  Achieves optimal ventilation and oxygenation:   Assess for changes in respiratory status   Oxygen supplementation based on oxygen saturation or arterial blood gases   Assess for changes in mentation and behavior   Assess and instruct to report shortness of breath or any respiratory difficulty     Problem: Cardiovascular - Adult  Goal: Maintains optimal cardiac output and  As I discussed with him at the visit the levels off the enzyme alk phos is not worrisome    I did check on the bone part of this enzyme and comes back slightly elevated  He is deficient in vitamin D and he can start taking vitamin D supplement 2000 international units a day and we will repeat it before his next visit with me

## 2024-01-06 NOTE — PLAN OF CARE
Problem: Discharge Planning  Goal: Discharge to home or other facility with appropriate resources  Outcome: Progressing  Discharge to home or other facility with appropriate resources:   Identify barriers to discharge with patient and caregiver   Arrange for needed discharge resources and transportation as appropriate   Identify discharge learning needs (meds, wound care, etc)   Arrange for interpreters to assist at discharge as needed   Refer to discharge planning if patient needs post-hospital services based on physician order or complex needs related to functional status, cognitive ability or social support system     Problem: Pain  Goal: Verbalizes/displays adequate comfort level or baseline comfort level  Outcome: Progressing  Verbalizes/displays adequate comfort level or baseline comfort level:   Encourage patient to monitor pain and request assistance   Assess pain using appropriate pain scale   Administer analgesics based on type and severity of pain and evaluate response   Implement non-pharmacological measures as appropriate and evaluate response   Consider cultural and social influences on pain and pain management   Notify Licensed Independent Practitioner if interventions unsuccessful or patient reports new pain     Problem: Neurosensory - Adult  Goal: Achieves stable or improved neurological status  Outcome: Progressing  Achieves stable or improved neurological status: Assess for and report changes in neurological status     Problem: Respiratory - Adult  Goal: Achieves optimal ventilation and oxygenation  Outcome: Progressing  Achieves optimal ventilation and oxygenation:   Assess for changes in respiratory status   Assess for changes in mentation and behavior   Position to facilitate oxygenation and minimize respiratory effort     Problem: Cardiovascular - Adult  Goal: Maintains optimal cardiac output and hemodynamic stability  Outcome: Progressing  Maintains optimal cardiac output and hemodynamic  therapy assess nares and determine need for appliance change or resting period.  1/5/2024 1146 by Pippa Mustafa, RN  Outcome: Progressing     Problem: Safety - Adult  Goal: Free from fall injury  Outcome: Progressing  Free From Fall Injury: Instruct family/caregiver on patient safety     Problem: ABCDS Injury Assessment  Goal: Absence of physical injury  Outcome: Progressing  Absence of Physical Injury: Implement safety measures based on patient assessment     Problem: Chronic Conditions and Co-morbidities  Goal: Patient's chronic conditions and co-morbidity symptoms are monitored and maintained or improved  Outcome: Progressing  Care Plan - Patient's Chronic Conditions and Co-Morbidity Symptoms are Monitored and Maintained or Improved:   Monitor and assess patient's chronic conditions and comorbid symptoms for stability, deterioration, or improvement   Collaborate with multidisciplinary team to address chronic and comorbid conditions and prevent exacerbation or deterioration   Update acute care plan with appropriate goals if chronic or comorbid symptoms are exacerbated and prevent overall improvement and discharge   Care plan reviewed with patient and family.  Patient and family verbalize understanding of the plan of care and contribute to goal setting.

## 2024-01-06 NOTE — DISCHARGE SUMMARY
Hospitalist Discharge Note      Patient:  Ivy Ruelas    Unit/Bed:4A-03/003-A  YOB: 1980  MRN: [id number]   Acct: [id number]     PCP: Anjelica Lantigua APRN - CNP  Date of Admission: 1/2/2024      Discharge date: No discharge date for patient encounter.    Chief Complaint on presentation :-  found unconscious at work      Discharge Assessment and Plan:-   Stroke-like symptoms (ischemic stroke ruled out) / mutism, unclear etiology:  S/p TNK 1/2 2138 - monitored in ICU and transferred out 1/4  Neuro consulted and following  CTH neg. Repeat CTH following TNK also neg. MRI brain neg for acute infarct - will need repeat MRI brain in 3 weeks as for evaluation  CTA head / neck without LVO - no indication for carotid US  4 hour EEG neg for epileptiform activity   Continue statin, cleared now for antiplatelets as s/p 24 hours TNK (allergy to ASA), charlie Plavix 75 mg daily until f/u with outpatient Neuro per their recommendations  PT/OT/SLP  Neuro checks   Psych consulted for eval   Hypomagnesemia: Mag noted at 1.3, repleted with 4 g Mag prior to discharge.  Repeat outpatient Mag Monday.   Leukocytosis: WBC is chronically elevated, unclear etiology. Pt has never seen a hematologist. Denies any infectious s/s. Cont to monitor. Referral to Heme/Onc at discharge.   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 female with past medical history of vertigo, asthma, gout, and MDRO who was presented to [facility] ED after being found unconscious on the floor at work by her co-worker. Reported that when EMS arrived in the scene, the patient was awake and alert but unable to talk or communicate. Patient's last known well was 1-2 hours prior to ED arrival. Stroke alert was activated. CT Head wo contrast shows no acute intracranial abnormality. CTA neck and head w wo contrast shows no hemodynamically significant  narrowing of the cervical carotid artery okay or vertebral artery circulation. Neurology was immediately consulted by ED physician, Dr. Bonilla. TNK treatment was discussed to patient's family by ED physician, and was administered at 21:38. Patient got admitted to ICU for post TNK management and close hemodynamic monitoring.     On ICU arrival, patient is awake, alert, and oriented but still mute, unable to communicate with global aphasia. But able to write and nods head to communicate. Daughter at bedside stated that the patient's stroke symptoms has improvement. Earlier patient cannot lift up her both upper extremities and not following commands. But now patient able to follow commands, able to lift all extremities, there is a drift but noticed some effort against gravity. No acute respiratory distress noted while on room air. Family and patient confirmed full code status, primary nurse at bedside.\"     1/5: pt sitting up in the chair. She is fully alert and follows commands however she is unable to speak. When asked if she is able to utter sounds she makes a \"more or less\" motioning with her hands. Denies any pain, denies urinary symptoms, burning or urgency. Reports she had a cough with yellow/green sputum recently for ~ 8 weeks. CXR on admit was neg for acute process.        Physical Exam:-  Vitals:   Patient Vitals for the past 24 hrs:   BP Temp Temp src Pulse Resp SpO2 Weight   01/06/24 1120 (!) 118/91 98.1 °F (36.7 °C) Oral 92 16 98 % --   01/06/24 0837 124/67 98.1 °F (36.7 °C) Oral 82 16 98 % --   01/06/24 0306 105/64 97.9 °F (36.6 °C) Oral 84 18 96 % 82.1 kg (181 lb)   01/05/24 2336 118/74 98.4 °F (36.9 °C) Oral 80 16 99 % --   01/05/24 2013 105/70 98.6 °F (37 °C) Oral 96 16 97 % --   01/05/24 1543 112/80 98.4 °F (36.9 °C) Oral 87 16 100 % --     Weight:   Weight - Scale: 82.1 kg (181 lb)   24 hour intake/output:     Intake/Output Summary (Last 24 hours) at 1/6/2024 1453  Last data filed at 1/6/2024

## 2024-01-06 NOTE — PROGRESS NOTES
AD.    Restrictions/Precautions:  Restrictions/Precautions: Fall Risk, General Precautions  Position Activity Restriction  Other position/activity restrictions: expressive aphasia     SUBJECTIVE: pt not attempting to vocalize any words despite encouragement from PT to try-pt just nods head for no. Pt nods head and gestures with hands to communicate. Pt able to follow all commands.    PAIN: no pain per pt    Vitals: Vitals not assessed per clinical judgement, see nursing flowsheet    OBJECTIVE:  Bed Mobility:  Rolling to Right: Modified Independent   Supine to Sit: Modified Independent  Sit to Supine: Not tested   Scooting: Modified Independent  HOB flat and no BR  Transfers:  Sit to Stand: Supervision  Stand to Sit:Supervision  From EOB to bedside chair, slow and guarded, but steady  Ambulation:  Stand By Assistance  Distance: 110'x1, 10'x1  Surface: Level Tile  Device:No Device  Gait Deviations:  Slow Kristyn, Decreased Step Length Bilaterally, Wide Base of Support, and guarded but steady and no LOB    Balance:  Static Sitting Balance:  Modified Independent  Dynamic Sitting Balance: Modified Independent, reaching OBOS head to waist level, no LOB  Static Standing Balance: Stand By Assistance  Dynamic Standing Balance: Stand By Assistance  0-1 UE support, reaching head to knee level, slow and guarded, no LOB  Exercise:  Patient was guided in 1 set(s) 10 reps of exercise to both lower extremities.  Seated marches, Seated heel/toe raises, Long arc quads, and Seated abduction/adduction.  For pt to cont throughout the day to maintain strength. Exercises were completed for increased independence with functional mobility.    Functional Outcome Measures: Completed  AM-PAC Inpatient Mobility without Stair Climbing Raw Score : 17  AM-PAC Inpatient without Stair Climbing T-Scale Score : 48.47  Modified Nolan Scale:  +2 - Slight disability; unable to carry out all previous activities, but able to look after own affairs without  assistance    ASSESSMENT:  Assessment: Patient progressing toward established goals.  Activity Tolerance:  Patient tolerance of  treatment: good.      Equipment Recommendations:Equipment Needed: No  Discharge Recommendations: Continue to assess pending progress  Plan: Current Treatment Recommendations: Strengthening, ROM, Balance training, Functional mobility training, Transfer training, Neuromuscular re-education, Gait training, Stair training, Endurance training, Return to work related activity, Home exercise program, Equipment evaluation, education, & procurement, Patient/Caregiver education & training, Safety education & training, Therapeutic activities  General Plan:  (6x N)    Education  Education:  Learners: Patient  Patient Education: Bed Mobility, Transfers, Gait    Goals:  Patient Goals : unable to state  Short Term Goals  Time Frame for Short Term Goals: by hospital d/c  Short Term Goal 1: Pt to complete supine <->Sit indep for ease getting in bed-MET  Short Term Goal 2: Pt to complete sit <->Stand mod indep with RW  for safe trasnfers  Short Term Goal 3: Pt to ambulate >=50' with RW mod I for safe ambulation in her environment  Short Term Goal 4: Pt to complete 4 steps with uni rail with SBA for safe home entry  Short Term Goal 5: Pt to improve Tinetti score to >=19/28 to progress to the moderate fall risk category  Long Term Goals  Time Frame for Long Term Goals : NA due to short ELOS    Following session, patient left in safe position with all fall risk precautions in place.

## 2024-01-06 NOTE — PROGRESS NOTES
Neurology Progress Note    Date:1/6/2024       Room:[facility]03003-  Patient Name:Ivy Ruelas     YOB: 1980     Age:43 y.o.    Requesting Physician: Ophelia Alberto PA-C     Reason for Consult:  Evaluate for stroke alert last night      Chief Complaint:   Chief Complaint   Patient presents with    Altered Mental Status       Subjective     Ivy Ruelas is a 43 y.o. female with a history of asthma, gout, IBS, and vertigo, current smoker,  who presented to [facility] ED yesterday with the chief complaint of altered mental status and unresponsiveness. 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 Per ED chart review patient was minimally responsive in the ED. She was arousable to voice and able to follow commands. Initial NIH was screened in the ED by ED provider and was found to be 17. Patient was aphasic, had BUE drift, minimal resistance in BLE. Initial glucose was 253. Blood pressure in the ED was 172/116. Stat CT head obtained and was negative for any acute hemorrhage. Dr. Jean neurointerventionalist was contacted by the ED provider and case was discussed in detail. Given patient presented within the time window to receive TNK and had no contraindications to TNK it was administered at 2138. Stat CTA head and neck obtained and was negative for LVO. Given this patient was deemed not a candidate for neurointervention. Patient was then admitted to the ICU for close monitoring. When patient arrived to ICU her NIH had improved to a 10 - 2 for LOC questions, 2 for LUE drift, 1 for RUE drift, 1 for RLE drift, 1 for LLE drift, and 3 for best language. No acute events happened overnight with the patient and no angioedema documented. On examination of patient this morning she is able to follow commands. No drift noted in any extremity. Patient was still mute and became tearful when attempting to speak. Patient's family at bedside deny any known  Grandmother     Asthma Maternal Grandmother     Asthma Mother     Asthma Father     Diabetes Maternal Grandfather     Kidney Disease Maternal Grandfather        Physical Examination      Vitals:  BP (!) 118/91   Pulse 92   Temp 98.1 °F (36.7 °C) (Oral)   Resp 16   Ht 1.626 m (5' 4\")   Wt 82.1 kg (181 lb)   LMP 2015   SpO2 98%   BMI 31.07 kg/m²   Temp (24hrs), Av.2 °F (36.8 °C), Min:97.9 °F (36.6 °C), Max:98.6 °F (37 °C)      I/O (24Hr):    Intake/Output Summary (Last 24 hours) at 2024 181  Last data filed at 2024 1413  Gross per 24 hour   Intake 470 ml   Output --   Net 470 ml       Physical Exam:  General: well-developed, well-nourished, appears comfortable, smiles appropriately  HENT: NCAT, Anicteric sclera, mucous membranes moist, trachea midline  Chest: CTA, On RA, no wheezing or rhonchi  Heart: RRR, no murmurs, rubs or gallops, extremities warm and dry  ABD: soft, nt/nd, +BS x 4  Skin: dry, no visible wounds, No visible rashes      Neuro:  Awake, alert, answers questions by nodding or shaking head or by texting. Conversation is appropriate and in context. She is able to follow 1&2 step commands without difficulty. She does not phonate to communicate  She is able to mimic \"mama\" without sound, she is able to mimic \"lalala\" without sound, she is able to mimic \"gagaga\" without sound, palate rises and falls symmetrically. She is able to hum for a short moment.  PERRL, EOMi, VFFTC  Face activates symmetrically, tongue protrudes midline with full ROM  Shoulder shrug strong and symmetric  Strength intact and symmetric throughout  Sensory intact to light touch x 4.       Labs/Imaging/Diagnostics   Labs:  CBC:  Recent Labs     24  0751 24  0454 24  0323   WBC 17.8* 17.6* 19.4*   RBC 4.60 4.70 4.63   HGB 13.8 14.0 13.8   HCT 41.4 42.9 42.0   MCV 90.0 91.3 90.7    227 262       CHEMISTRIES:  Recent Labs     24  0751 24  0454 24  0323     --  138

## 2024-01-06 NOTE — CONSULTS
Date    APPENDECTOMY      DILATION AND CURETTAGE OF UTERUS      ENDOMETRIAL ABLATION  08/13/14    with hysteroscopy, D&C    HYSTERECTOMY (CERVIX STATUS UNKNOWN)      TUBAL LIGATION           Medications Prior to Admission:   Current Outpatient Medications   Medication Instructions    acetaminophen (TYLENOL) 500 mg, Oral, 4 TIMES DAILY PRN    acetaminophen (TYLENOL) 1,000 mg, Oral, EVERY 6 HOURS PRN    budesonide (PULMICORT FLEXHALER) 180 MCG/ACT AEPB inhaler 2 puffs, Inhalation, 2 TIMES DAILY RESP    ibuprofen (ADVIL;MOTRIN) 400 mg, Oral, EVERY 6 HOURS PRN    TART CHERRY PO 1 tablet, Oral, DAILY         Allergies:    Allergies   Allergen Reactions    Aspirin Hives and Swelling        Social History:      RESIDENCE:  Lives with  in [place], [place]  LEVEL OF EDUCATION:   Graduated high school   MARITAL STATUS:  and is first marriage  CHILDREN: 2 betsy 22 and 24 y/o  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   HENT: Negative for congestion, sore throat and tinnitus.    Eyes: Negative for visual disturbance.   Respiratory: Negative for cough, shortness of breath and wheezing.    Cardiovascular: Negative for chest pain and leg swelling.   Gastrointestinal: Negative for nausea, vomiting, diarrhea. Negative for abdominal pain.   Genitourinary: Negative for frequency.   Musculoskeletal: Negative for arthralgias, myalgias and neck stiffness.   Skin: Negative for puritis.   Neurological:  Negative for dizziness, weakness and headaches.   All other systems reviewed and are negative.        PHYSICAL EXAM:  Vitals:  /67   Pulse 82   Temp 98.1 °F (36.7 °C) (Oral)   Resp 16   Ht 1.626 m (5' 4\")   Wt 82.1 kg (181 lb)   LMP 08/17/2015   SpO2 98%   BMI 31.07 kg/m²        Physical Exam:  See internal medicine      Mental Status Examination:    Level of consciousness:  within normal limits  Appearance:  well-appearing, hospital attire, in chair, good grooming and good hygiene  Behavior/Motor:  no abnormalities noted  Attitude toward examiner:  cooperative, attentive and good eye contact  Speech: Unable to speak but did answer questions via nods  and writes some responses to questions   Mood: Euthymic  Affect:  Reactive  Thought processes:  linear, goal directed and coherent  Thought content:  Denies homicidal ideation  Suicidal Ideation:  denies suicidal ideation  Delusions:  no evidence of delusions  Perceptual Disturbance:  denies any perceptual disturbance  Cognition: Patient is oriented to person, place, time and situation  Concentration: clinically adequate  Memory: intact  Insight & Judgement: Good     DSM-5 DIAGNOSIS:  Mutism  - Unknown etiology        GENERAL PATIENT/FAMILY EDUCATION  Patient will understand basic signs and symptoms, Patient will understand benefits/risks and potential side effects from meds and Patient will understand their role in recovery.  Family is  active in patient's care.   Patient assets that may be helpful during treatment include: Intent to participate and engage in treatment, sufficient fund of knowledge and intellect to understand and utilize treatments.     RECOMMENDATION:    Continue medical treatment as ordered  Mental Health medication or MH referral  is not indicated  April advised to seek out mental health care  if feels need in future.  Call psychiatry with any questions or concerns         Thank you for allowing us to participate in the care of this

## 2024-01-06 NOTE — PROGRESS NOTES
[facility]  [facility] NEUROSCIENCES 4A  Occupational Therapy  Daily Note  Time:   Time In: 1025  Time Out: 1052  Timed Code Treatment Minutes: 27 Minutes  Minutes: 27          Date: 2024  Patient Name: Ivy Ruelas   Gender: female      Room: -03/003-A  MRN: [id number]  : 1980  (43 y.o.)  Referring Practitioner: Jenae Ryan APRN - CNP  Diagnosis: CVA  Additional Pertinent Hx: 43 y.o. female with a history of asthma, gout, IBS, and vertigo who presented to [facility] ED yesterday  with the chief complaint of altered mental status and unresponsiveness. 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 Per ED chart review patient was minimally responsive in the ED. She was arousable to voice and able to follow commands. Initial NIH was screened in the ED by ED provider and was found to be 17. Patient was aphasic, had BUE drift, minimal resistance in BLE. Initial glucose was 253. Blood pressure in the ED was 172/116. Stat CT head obtained and was negative for any acute hemorrhage. Dr. Jean neurointerventionalist was contacted by the ED provider and case was discussed in detail. Given patient presented within the time window to receive TNK and had no contraindications to TNK it was administered at 2138. Stat CTA head and neck obtained and was negative for LVO. Given this patient was deemed not a candidate for neurointervention. Patient was then admitted to the ICU for close monitoring. When patient arrived to ICU her NIH had improved to a 10 - 2 for LOC questions, 2 for LUE drift, 1 for RUE drift, 1 for RLE drift, 1 for LLE drift, and 3 for best language. No acute events happened overnight with the patient and no angioedema documented. On examination of patient this morning she is able to follow commands.    Restrictions/Precautions:  Restrictions/Precautions: Fall Risk, General Precautions  Position Activity Restriction  Other

## 2024-01-23 ENCOUNTER — HOSPITAL ENCOUNTER (OUTPATIENT)
Dept: SPEECH THERAPY | Age: 44
Setting detail: THERAPIES SERIES
Discharge: HOME | End: 2024-01-23
Payer: COMMERCIAL

## 2024-01-23 PROCEDURE — 92523 SPEECH SOUND LANG COMPREHEN: CPT

## 2024-01-23 NOTE — PROGRESS NOTES
physician H&P for full details. Per chart review, \"3 y.o. female with past medical history of vertigo, asthma, gout, and MDRO who was presented to [facility] ED after being found unconscious on the floor at work by her co-worker. Reported that when EMS arrived in the scene, the patient was awake and alert but unable to talk or communicate. Patient's last known well was 1-2 hours prior to ED arrival. Stroke alert was activated. CT Head wo contrast shows no acute intracranial abnormality. CTA neck and head w wo contrast shows no hemodynamically significant narrowing of the cervical carotid artery okay or vertebral artery circulation. Neurology was immediately consulted by ED physician, Dr. Bonilla. TNK treatment was discussed to patient's family by ED physician, and was administered at 21:38. Patient got admitted to ICU for post TNK management and close hemodynamic monitoring. On ICU arrival, patient is awake, alert, and oriented but still mute, unable to communicate with global aphasia. But able to write and nods head to communicate. Daughter at bedside stated that the patient's stroke symptoms has improvement. Earlier patient cannot lift up her both upper extremities and not following commands. But now patient able to follow commands, able to lift all extremities, there is a drift but noticed some effort against gravity. No acute respiratory distress noted while on room air. Family and patient confirmed full code status, primary nurse at bedside.\"     MRI brain 01/03/2024  IMPRESSION:   1. No evidence of an acute infarct.  2. 2 faint foci of signal hyperintensity are present in the subcortical white matter the right frontal lobe. These are nonspecific. These could be due to prior trauma, prior inflammation, chronic headaches, or early chronic small vessel ischemic changes.\"    MBS completed 01/03/24 documenting oral phase of the swallow to be normal and pharyngeal phase to be WFL/Modified Independent recommending regular

## 2024-01-25 ENCOUNTER — HOSPITAL ENCOUNTER (OUTPATIENT)
Dept: SPEECH THERAPY | Age: 44
Setting detail: THERAPIES SERIES
Discharge: HOME | End: 2024-01-25
Payer: COMMERCIAL

## 2024-01-25 PROCEDURE — 92507 TX SP LANG VOICE COMM INDIV: CPT

## 2024-01-25 NOTE — PROGRESS NOTES
[facility]  SPEECH THERAPY  [] SPEECH LANGUAGE COGNITIVE EVALUATION  [x] DAILY NOTE   [] PROGRESS NOTE [] DISCHARGE NOTE    [x] [facility] [place]   [] [facility]    [] [facility]   [] [facility]    Date: 2024  Patient Name:  Ivy Ruelas  : 1980  MRN: [id number]  CSN: [id number]    Referring Practitioner Anjelica Lantigua APRN - CNP    Diagnosis Aphasia following unspecified cerebrovascular disease    Treatment Diagnosis I69.820 Aphasia following other cerebrovascular disease   Date of Evaluation 24       Functional Outcome Measure Used MAST    Functional Outcome Score MAST 60/100 (24)       Insurance: Primary: Payor: [facility] /  /  / ,   Secondary:    Authorization Information:  No pre-certification is needed.    Approved Procedure Codes: Authorization of Specific CPT Codes Not Required   Visit # 2, 2/10 for progress note   Visits Allowed: ; PT, OT and ST are allowed 30 visits each per calendar year.    Recertification Date: 24   Physician Follow-Up: ARIELLA Lozada  24   Physician Orders: Eval and Treat   History of Present Illness:  Per chart review and patient confirming information is correct, \"Patient admitted to [facility] 2024 with above med dx; please refer to physician H&P for full details. Per chart review, \"3 y.o. female with past medical history of vertigo, asthma, gout, and MDRO who was presented to [facility] ED after being found unconscious on the floor at work by her co-worker. Reported that when EMS arrived in the scene, the patient was awake and alert but unable to talk or communicate. Patient's last known well was 1-2 hours prior to ED arrival. Stroke alert was activated. CT Head wo contrast shows no acute intracranial abnormality. CTA neck and head w wo contrast shows no hemodynamically significant narrowing of the cervical carotid

## 2024-01-29 ENCOUNTER — HOSPITAL ENCOUNTER (OUTPATIENT)
Dept: SPEECH THERAPY | Age: 44
Setting detail: THERAPIES SERIES
Discharge: HOME | End: 2024-01-29
Payer: COMMERCIAL

## 2024-01-29 PROCEDURE — 92507 TX SP LANG VOICE COMM INDIV: CPT

## 2024-01-31 ENCOUNTER — HOSPITAL ENCOUNTER (OUTPATIENT)
Dept: SPEECH THERAPY | Age: 44
Setting detail: THERAPIES SERIES
Discharge: HOME | End: 2024-01-31
Payer: COMMERCIAL

## 2024-01-31 PROCEDURE — 92507 TX SP LANG VOICE COMM INDIV: CPT | Performed by: SPEECH-LANGUAGE PATHOLOGIST

## 2024-02-05 ENCOUNTER — APPOINTMENT (OUTPATIENT)
Dept: SPEECH THERAPY | Age: 44
End: 2024-02-05
Payer: COMMERCIAL

## 2024-02-07 ENCOUNTER — HOSPITAL ENCOUNTER (OUTPATIENT)
Dept: SPEECH THERAPY | Age: 44
Setting detail: THERAPIES SERIES
Discharge: HOME | End: 2024-02-07
Payer: COMMERCIAL

## 2024-02-07 PROCEDURE — 92507 TX SP LANG VOICE COMM INDIV: CPT

## 2024-02-07 NOTE — PROGRESS NOTES
[facility]  SPEECH THERAPY  [] SPEECH LANGUAGE COGNITIVE EVALUATION  [x] DAILY NOTE   [] PROGRESS NOTE [] DISCHARGE NOTE    [x] [facility] [place]   [] [facility]    [] [facility]   [] [facility]    Date: 2024  Patient Name:  Ivy Ruelas  : 1980  MRN: [id number]  CSN: [id number]    Referring Practitioner Anjelica Lantigua APRN - CNP    Diagnosis Aphasia following unspecified cerebrovascular disease    Treatment Diagnosis I69.820 Aphasia following other cerebrovascular disease   Date of Evaluation 24       Functional Outcome Measure Used MAST    Functional Outcome Score MAST 60/100 (24)       Insurance: Primary: Payor: [facility] /  /  / ,   Secondary:    Authorization Information:  No pre-certification is needed.    Approved Procedure Codes: Authorization of Specific CPT Codes Not Required   Visit # 5, 5/10 for progress note   Visits Allowed: ; PT, OT and ST are allowed 30 visits each per calendar year.    Recertification Date: 24   Physician Follow-Up: ARIELLA Lozada  24   Physician Orders: Eval and Treat   History of Present Illness:  Per chart review and patient confirming information is correct, \"Patient admitted to [facility] 2024 with above med dx; please refer to physician H&P for full details. Per chart review, \"3 y.o. female with past medical history of vertigo, asthma, gout, and MDRO who was presented to [facility] ED after being found unconscious on the floor at work by her co-worker. Reported that when EMS arrived in the scene, the patient was awake and alert but unable to talk or communicate. Patient's last known well was 1-2 hours prior to ED arrival. Stroke alert was activated. CT Head wo contrast shows no acute intracranial abnormality. CTA neck and head w wo contrast shows no hemodynamically significant narrowing of the cervical carotid artery

## 2024-02-14 ENCOUNTER — APPOINTMENT (OUTPATIENT)
Dept: CT IMAGING | Age: 44
End: 2024-02-14

## 2024-02-14 ENCOUNTER — APPOINTMENT (OUTPATIENT)
Dept: GENERAL RADIOLOGY | Age: 44
End: 2024-02-14

## 2024-02-14 ENCOUNTER — HOSPITAL ENCOUNTER (EMERGENCY)
Age: 44
Discharge: HOME | End: 2024-02-14
Attending: EMERGENCY MEDICINE

## 2024-02-14 ENCOUNTER — HOSPITAL ENCOUNTER (OUTPATIENT)
Dept: SPEECH THERAPY | Age: 44
Setting detail: THERAPIES SERIES
Discharge: HOME | End: 2024-02-14
Payer: COMMERCIAL

## 2024-02-14 VITALS
TEMPERATURE: 98.4 F | DIASTOLIC BLOOD PRESSURE: 96 MMHG | HEART RATE: 90 BPM | RESPIRATION RATE: 23 BRPM | BODY MASS INDEX: 31.93 KG/M2 | SYSTOLIC BLOOD PRESSURE: 123 MMHG | WEIGHT: 186 LBS | OXYGEN SATURATION: 98 %

## 2024-02-14 DIAGNOSIS — J06.9 VIRAL UPPER RESPIRATORY TRACT INFECTION: Primary | ICD-10-CM

## 2024-02-14 LAB
ALBUMIN SERPL BCG-MCNC: 4.5 G/DL (ref 3.5–5.1)
ALP SERPL-CCNC: 109 U/L (ref 38–126)
ALT SERPL W/O P-5'-P-CCNC: 27 U/L (ref 11–66)
ANION GAP SERPL CALC-SCNC: 13 MEQ/L (ref 8–16)
AST SERPL-CCNC: 23 U/L (ref 5–40)
BACTERIA URNS QL MICRO: ABNORMAL /HPF
BASOPHILS ABSOLUTE: 0.1 THOU/MM3 (ref 0–0.1)
BASOPHILS NFR BLD AUTO: 0.7 %
BILIRUB CONJ SERPL-MCNC: < 0.2 MG/DL (ref 0–0.3)
BILIRUB SERPL-MCNC: 0.3 MG/DL (ref 0.3–1.2)
BILIRUB UR QL STRIP.AUTO: NEGATIVE
BUN SERPL-MCNC: 8 MG/DL (ref 7–22)
CALCIUM SERPL-MCNC: 8.8 MG/DL (ref 8.5–10.5)
CASTS #/AREA URNS LPF: ABNORMAL /LPF
CASTS 2: ABNORMAL /LPF
CHARACTER UR: CLEAR
CHLORIDE SERPL-SCNC: 99 MEQ/L (ref 98–111)
CO2 SERPL-SCNC: 28 MEQ/L (ref 23–33)
COLOR: YELLOW
CREAT SERPL-MCNC: 0.8 MG/DL (ref 0.4–1.2)
CRYSTALS URNS MICRO: ABNORMAL
DEPRECATED RDW RBC AUTO: 41.1 FL (ref 35–45)
EKG ATRIAL RATE: 121 BPM
EKG P AXIS: 47 DEGREES
EKG P-R INTERVAL: 132 MS
EKG Q-T INTERVAL: 332 MS
EKG QRS DURATION: 86 MS
EKG QTC CALCULATION (BAZETT): 471 MS
EKG R AXIS: 6 DEGREES
EKG T AXIS: 68 DEGREES
EKG VENTRICULAR RATE: 121 BPM
EOSINOPHIL NFR BLD AUTO: 3 %
EOSINOPHILS ABSOLUTE: 0.3 THOU/MM3 (ref 0–0.4)
EPITHELIAL CELLS, UA: ABNORMAL /HPF
ERYTHROCYTE [DISTWIDTH] IN BLOOD BY AUTOMATED COUNT: 12.8 % (ref 11.5–14.5)
FLUAV RNA RESP QL NAA+PROBE: NOT DETECTED
FLUBV RNA RESP QL NAA+PROBE: NOT DETECTED
GFR SERPL CREATININE-BSD FRML MDRD: > 60 ML/MIN/1.73M2
GLUCOSE SERPL-MCNC: 145 MG/DL (ref 70–108)
GLUCOSE UR QL STRIP.AUTO: NEGATIVE MG/DL
HCT VFR BLD AUTO: 41.2 % (ref 37–47)
HGB BLD-MCNC: 13.9 GM/DL (ref 12–16)
HGB UR QL STRIP.AUTO: ABNORMAL
IMM GRANULOCYTES # BLD AUTO: 0.04 THOU/MM3 (ref 0–0.07)
IMM GRANULOCYTES NFR BLD AUTO: 0.4 %
KETONES UR QL STRIP.AUTO: NEGATIVE
LACTIC ACID, SEPSIS: 1.8 MMOL/L (ref 0.5–1.9)
LIPASE SERPL-CCNC: 25.2 U/L (ref 5.6–51.3)
LYMPHOCYTES ABSOLUTE: 3.3 THOU/MM3 (ref 1–4.8)
LYMPHOCYTES NFR BLD AUTO: 30.5 %
MCH RBC QN AUTO: 29.5 PG (ref 26–33)
MCHC RBC AUTO-ENTMCNC: 33.7 GM/DL (ref 32.2–35.5)
MCV RBC AUTO: 87.5 FL (ref 81–99)
MISCELLANEOUS 2: ABNORMAL
MONOCYTES ABSOLUTE: 0.7 THOU/MM3 (ref 0.4–1.3)
MONOCYTES NFR BLD AUTO: 6.9 %
NITRITE UR QL STRIP: NEGATIVE
NRBC BLD AUTO-RTO: 0 /100 WBC
OSMOLALITY SERPL CALC.SUM OF ELEC: 280.3 MOSMOL/KG (ref 275–300)
PH UR STRIP.AUTO: 6.5 [PH] (ref 5–9)
PLATELET # BLD AUTO: 277 THOU/MM3 (ref 130–400)
PMV BLD AUTO: 10.2 FL (ref 9.4–12.4)
POTASSIUM SERPL-SCNC: 3.1 MEQ/L (ref 3.5–5.2)
PROCALCITONIN SERPL IA-MCNC: 0.1 NG/ML (ref 0.01–0.09)
PROT SERPL-MCNC: 8 G/DL (ref 6.1–8)
PROT UR STRIP.AUTO-MCNC: NEGATIVE MG/DL
RBC # BLD AUTO: 4.71 MILL/MM3 (ref 4.2–5.4)
RBC URINE: ABNORMAL /HPF
RENAL EPI CELLS #/AREA URNS HPF: ABNORMAL /[HPF]
SARS-COV-2 RNA RESP QL NAA+PROBE: NOT DETECTED
SEGMENTED NEUTROPHILS ABSOLUTE COUNT: 6.3 THOU/MM3 (ref 1.8–7.7)
SODIUM SERPL-SCNC: 140 MEQ/L (ref 135–145)
SP GR UR REFRACT.AUTO: 1.01 (ref 1–1.03)
TROPONIN, HIGH SENSITIVITY: < 6 NG/L (ref 0–12)
TSH SERPL DL<=0.005 MIU/L-ACNC: 1.15 UIU/ML (ref 0.4–4.2)
UROBILINOGEN, URINE: 1 EU/DL (ref 0–1)
WBC # BLD AUTO: 10.7 THOU/MM3 (ref 4.8–10.8)
WBC #/AREA URNS HPF: ABNORMAL /HPF
WBC #/AREA URNS HPF: NEGATIVE /[HPF]
YEAST LIKE FUNGI URNS QL MICRO: ABNORMAL

## 2024-02-14 PROCEDURE — 87636 SARSCOV2 & INF A&B AMP PRB: CPT

## 2024-02-14 PROCEDURE — 85025 COMPLETE CBC W/AUTO DIFF WBC: CPT

## 2024-02-14 PROCEDURE — 87040 BLOOD CULTURE FOR BACTERIA: CPT

## 2024-02-14 PROCEDURE — 6370000000 HC RX 637 (ALT 250 FOR IP): Performed by: EMERGENCY MEDICINE

## 2024-02-14 PROCEDURE — 96361 HYDRATE IV INFUSION ADD-ON: CPT

## 2024-02-14 PROCEDURE — 84443 ASSAY THYROID STIM HORMONE: CPT

## 2024-02-14 PROCEDURE — 83690 ASSAY OF LIPASE: CPT

## 2024-02-14 PROCEDURE — 2580000003 HC RX 258: Performed by: EMERGENCY MEDICINE

## 2024-02-14 PROCEDURE — 84484 ASSAY OF TROPONIN QUANT: CPT

## 2024-02-14 PROCEDURE — 93005 ELECTROCARDIOGRAM TRACING: CPT | Performed by: EMERGENCY MEDICINE

## 2024-02-14 PROCEDURE — 83605 ASSAY OF LACTIC ACID: CPT

## 2024-02-14 PROCEDURE — 93010 ELECTROCARDIOGRAM REPORT: CPT | Performed by: INTERNAL MEDICINE

## 2024-02-14 PROCEDURE — 81001 URINALYSIS AUTO W/SCOPE: CPT

## 2024-02-14 PROCEDURE — 71046 X-RAY EXAM CHEST 2 VIEWS: CPT

## 2024-02-14 PROCEDURE — 36415 COLL VENOUS BLD VENIPUNCTURE: CPT

## 2024-02-14 PROCEDURE — 99285 EMERGENCY DEPT VISIT HI MDM: CPT

## 2024-02-14 PROCEDURE — 84145 PROCALCITONIN (PCT): CPT

## 2024-02-14 PROCEDURE — 92507 TX SP LANG VOICE COMM INDIV: CPT

## 2024-02-14 PROCEDURE — 74176 CT ABD & PELVIS W/O CONTRAST: CPT

## 2024-02-14 PROCEDURE — 80053 COMPREHEN METABOLIC PANEL: CPT

## 2024-02-14 PROCEDURE — 96360 HYDRATION IV INFUSION INIT: CPT

## 2024-02-14 PROCEDURE — 82248 BILIRUBIN DIRECT: CPT

## 2024-02-14 RX ORDER — 0.9 % SODIUM CHLORIDE 0.9 %
30 INTRAVENOUS SOLUTION INTRAVENOUS ONCE
Status: COMPLETED | OUTPATIENT
Start: 2024-02-14 | End: 2024-02-14

## 2024-02-14 RX ORDER — ACETAMINOPHEN 325 MG/1
650 TABLET ORAL ONCE
Status: COMPLETED | OUTPATIENT
Start: 2024-02-14 | End: 2024-02-14

## 2024-02-14 RX ORDER — ONDANSETRON 2 MG/ML
4 INJECTION INTRAMUSCULAR; INTRAVENOUS ONCE
Status: DISCONTINUED | OUTPATIENT
Start: 2024-02-14 | End: 2024-02-14 | Stop reason: HOSPADM

## 2024-02-14 RX ADMIN — POTASSIUM BICARBONATE 40 MEQ: 782 TABLET, EFFERVESCENT ORAL at 16:51

## 2024-02-14 RX ADMIN — SODIUM CHLORIDE 1641 ML: 9 INJECTION, SOLUTION INTRAVENOUS at 14:45

## 2024-02-14 RX ADMIN — ACETAMINOPHEN 650 MG: 325 TABLET ORAL at 15:39

## 2024-02-14 NOTE — ED NOTES
Pt medicated per MAR. Remains in stable condition with call light in reach. Denies any further needs at this time.

## 2024-02-14 NOTE — ED PROVIDER NOTES
PATIENT NAME: Ivy Ruelas  MRN: [id number]  : 1980  SEGOVIA: 2024    I performed a history and physical examination of the patient and discussed management with the Resident. I reviewed the Resident's note and agree with the documented findings and plan of care. Any areas of disagreement are noted on the chart. I was personally present for the key portions of any procedures and have documented in the chart those procedures where I was not present during the key portions. I have reviewed the emergency nurses triage note and agree with the chief complaint, past medical history, past surgical history, allergies, medications, social and family history as documented unless otherwise noted below.    MEDICAL DEDISION MAKING (MDM)     Ivy Ruelas is a 43 y.o. female who presents to Emergency Department with Fever, Shortness of Breath, and Diarrhea     Subjective fever, cough, chest tightness, and diarrhea over the last week. No fever or chills in ED.  Tachycardic on arrival.    EKG interpreted by me as sinus tachycardia, ventricular rate 121 bpm, AR interval 132 ms, QRS duration 86 ms,  ms, no acute ischemic changes.  Normal EKG other than sinus tachycardia.    ED workups are consistent with viral illness and viral gastroenteritis.  No suspicion of this is a serious bacterial infection.  She feels much better on reassessment after ED treatment.  She is not able to provide stool sample for C. difficile check and GI panel check.  She is willing to be discharged as she feels significant improvement.    Discharged in stable conditions with Zofran prescribed.  PCP follow-up in 3-5 days.     Vitals:    24 1418 24 1420 24 1538   BP:  (!) 142/98 129/77   Pulse:  (!) 138 90   Resp:  24 18   Temp:  98.4 °F (36.9 °C)    TempSrc:  Oral    SpO2:  97% 96%   Weight: 84.4 kg (186 lb)       Labs Reviewed   BASIC METABOLIC PANEL - Abnormal; Notable for the following components:       Result Value    
Troponin:    Troponin, High Sensitivity < 6 [JS]   1759 Glomerular Filtration Rate, Estimated:    Est, Glom Filt Rate >60 [JS]   1759 Lipase:    Lipase 25.2 [JS]   1759 Basic Metabolic Panel(!):    Sodium 140   Potassium 3.1(!)   Chloride 99   CO2 28   Glucose, Random 145(!)   BUN,BUNPL 8   Creatinine 0.8   CALCIUM, SERUM, 294193 8.8 [JS]   1759 COVID-19 & Influenza Combo:    SARS-CoV-2 RNA, RT PCR NOT DETECTED   INFLUENZA A NOT DETECTED   INFLUENZA B NOT DETECTED [JS]   1759 CBC with Auto Differential:    WBC 10.7   RBC 4.71   Hemoglobin Quant 13.9   Hematocrit 41.2   MCV 87.5   MCH 29.5   MCHC 33.7   RDW-CV 12.8   RDW-SD 41.1   Platelet Count 277   MPV 10.2   Seg Neutrophils 58.5   Lymphocytes 30.5   Monocytes 6.9   Eosinophils 3.0   Basophils 0.7   Immature Granulocytes 0.4   Segs Absolute 6.3   Lymphocytes Absolute 3.3   Monocytes Absolute 0.7   Eosinophils Absolute 0.3   Basophils Absolute 0.1   Immature Grans (Abs) 0.04   Nucleated Red Blood Cells 0 [JS]   1759 XR CHEST (2 VW) [JS]   1759 CT ABDOMEN PELVIS WO CONTRAST Additional Contrast? None [JS]      ED Course User Index  [JS] Abdon Aragon DPM         PROCEDURES: (None if blank)  Procedures:     CRITICAL CARE:  None    MEDICATION CHANGES     New Prescriptions    No medications on file         FINAL DISPOSITION   Shared Decision-Making was performed, disposition discussed with the patient/family and questions answered.      Outpatient follow up (If applicable):  Anjelica Lantigua APRN - CNP  [address] [address]  [phone]    Go to   As needed, If symptoms worsen        FINAL DIAGNOSES:  Final diagnoses:   Viral upper respiratory tract infection       Condition: condition: good  Dispo: Discharge to home  DISPOSITION Discharge - Pending Orders Complete 02/14/2024 04:37:27 PM      This transcription was electronically signed. It was dictated by use of voice recognition software and electronically transcribed. The transcription may contain errors not

## 2024-02-14 NOTE — PROGRESS NOTES
production x5, no cues     CVC   Yes-successful production x5, no cues   Cup-successful production x5, no cues   Bug-successful production x5, no cues   Silva- successful production x5, no cues   Soon-successful production x 5, no cues    Phrase Repetition:   **D/t network difficulties, unable to state the specific phrases completed; however, patient repeated 12/14 phrases with great success given min cues to improve rate of speech; and 2/14 with moderate cues for rate and rhythm, however, did have correct syllabic breakdown.     SHORT TERM GOAL #3: Patient will read basic words (name, , family members names) out loud with 25% accuracy provided mod cues to improve overall ability to improve expressive speech.  INTERVENTIONS:  Reading family members names:   Anabele- independent with appropriate rate and rhythm   Luke - independent with appropriate rate and rhythm   Jovan-independent with appropriate rate and rhythm     Reading bisyllabic words:  independent, 1/16 min cues for rhythm, and 1/16 mod cues for rhythm and articulatory precision; slow rate throughout with increasing breathiness at the end of syllables    SHORT TERM GOAL #4: Patient will complete basic automatic speech tasks (counting 1-3, DAPHNEY, singing) with 25% accuracy provided mod cues to improve ability to improve expressive speech.   INTERVENTIONS: Not addressed specifically this date due to focus on additional goals.   PREVIOUS SESSION:  Counting 1-10: 10/10 independent, improved vocal quality   DAPHNEY: 7/ independent, reduced rate and rhythm  Sing: Patient repeated line for line of \"Ba, ba, black sheep\" after ST with visual cues for tones.  7/7 lines repeated with slow rate; however, fair to good imitation of tone/thao.    SHORT TERM GOAL #5: Patient will engage in multi-modal communication efforts (e.g, communication board, handwriting, iPad/iPhone applications) with appropriate processing speed and accurate selections/transcriptions to

## 2024-02-14 NOTE — ED TRIAGE NOTES
Presents to ED with c/o fever, cough, sob, and diarrhea. Patient states she has had a cough and fever for about a week and feels like it is in her chest now. Alert and oriented. Respirations easy and unlabored.

## 2024-02-16 LAB
BACTERIA BLD AEROBE CULT: NORMAL
BACTERIA BLD AEROBE CULT: NORMAL

## 2024-02-19 LAB
BACTERIA BLD AEROBE CULT: NORMAL
BACTERIA BLD AEROBE CULT: NORMAL

## 2024-02-20 ENCOUNTER — APPOINTMENT (OUTPATIENT)
Dept: SPEECH THERAPY | Age: 44
End: 2024-02-20
Payer: COMMERCIAL

## 2024-02-21 ENCOUNTER — APPOINTMENT (OUTPATIENT)
Dept: SPEECH THERAPY | Age: 44
End: 2024-02-21
Payer: COMMERCIAL

## 2024-02-26 ENCOUNTER — OFFICE VISIT (OUTPATIENT)
Dept: ONCOLOGY | Age: 44
End: 2024-02-26

## 2024-02-26 ENCOUNTER — HOSPITAL ENCOUNTER (OUTPATIENT)
Dept: INFUSION THERAPY | Age: 44
Discharge: HOME | End: 2024-02-26

## 2024-02-26 ENCOUNTER — HOSPITAL ENCOUNTER (OUTPATIENT)
Dept: SPEECH THERAPY | Age: 44
Setting detail: THERAPIES SERIES
End: 2024-02-26
Payer: COMMERCIAL

## 2024-02-26 VITALS
TEMPERATURE: 98.2 F | HEART RATE: 97 BPM | DIASTOLIC BLOOD PRESSURE: 83 MMHG | RESPIRATION RATE: 16 BRPM | SYSTOLIC BLOOD PRESSURE: 141 MMHG | OXYGEN SATURATION: 95 % | WEIGHT: 186 LBS | BODY MASS INDEX: 31.76 KG/M2 | HEIGHT: 64 IN

## 2024-02-26 VITALS
SYSTOLIC BLOOD PRESSURE: 141 MMHG | TEMPERATURE: 98.2 F | OXYGEN SATURATION: 95 % | DIASTOLIC BLOOD PRESSURE: 83 MMHG | RESPIRATION RATE: 16 BRPM | HEART RATE: 97 BPM

## 2024-02-26 DIAGNOSIS — D72.829 LEUKOCYTOSIS, UNSPECIFIED TYPE: ICD-10-CM

## 2024-02-26 DIAGNOSIS — D72.829 LEUKOCYTOSIS, UNSPECIFIED TYPE: Primary | ICD-10-CM

## 2024-02-26 LAB
ABSOLUTE IMMATURE GRANULOCYTE: 0.03 THOU/MM3 (ref 0–0.07)
BASOPHILS ABSOLUTE: 0.1 THOU/MM3 (ref 0–0.1)
BASOPHILS NFR BLD AUTO: 1 % (ref 0–3)
COHGB MFR BLDV: 2.3 % CO SAT
CRP SERPL-MCNC: 0.54 MG/DL (ref 0–1)
EOSINOPHIL NFR BLD AUTO: 2 % (ref 0–4)
EOSINOPHILS ABSOLUTE: 0.3 THOU/MM3 (ref 0–0.4)
ERYTHROCYTE [DISTWIDTH] IN BLOOD BY AUTOMATED COUNT: 13 % (ref 11.5–14.5)
ERYTHROCYTE [SEDIMENTATION RATE] IN BLOOD BY WESTERGREN METHOD: 52 MM/HR (ref 0–20)
HCT VFR BLD AUTO: 40.7 % (ref 37–47)
HGB BLD-MCNC: 13.9 GM/DL (ref 12–16)
IMMATURE GRANULOCYTES: 0 %
LYMPHOCYTES ABSOLUTE: 3 THOU/MM3 (ref 1–4.8)
LYMPHOCYTES NFR BLD AUTO: 20 % (ref 15–47)
MCH RBC QN AUTO: 29.7 PG (ref 26–33)
MCHC RBC AUTO-ENTMCNC: 34.2 GM/DL (ref 32.2–35.5)
MCV RBC AUTO: 87 FL (ref 81–99)
MONOCYTES ABSOLUTE: 0.9 THOU/MM3 (ref 0.4–1.3)
MONOCYTES NFR BLD AUTO: 6 % (ref 0–12)
NEUTROPHILS NFR BLD AUTO: 71 % (ref 43–75)
PLATELET # BLD AUTO: 257 THOU/MM3 (ref 130–400)
PMV BLD AUTO: 9.9 FL (ref 9.4–12.4)
RBC # BLD AUTO: 4.68 MILL/MM3 (ref 4.2–5.4)
SEGMENTED NEUTROPHILS ABSOLUTE COUNT: 10.6 THOU/MM3 (ref 1.8–7.7)
WBC # BLD AUTO: 14.9 THOU/MM3 (ref 4.8–10.8)

## 2024-02-26 PROCEDURE — 82375 ASSAY CARBOXYHB QUANT: CPT

## 2024-02-26 PROCEDURE — 85651 RBC SED RATE NONAUTOMATED: CPT

## 2024-02-26 PROCEDURE — 99204 OFFICE O/P NEW MOD 45 MIN: CPT | Performed by: NURSE PRACTITIONER

## 2024-02-26 PROCEDURE — 99211 OFF/OP EST MAY X REQ PHY/QHP: CPT

## 2024-02-26 PROCEDURE — 85025 COMPLETE CBC W/AUTO DIFF WBC: CPT

## 2024-02-26 PROCEDURE — 86140 C-REACTIVE PROTEIN: CPT

## 2024-02-26 PROCEDURE — 36415 COLL VENOUS BLD VENIPUNCTURE: CPT

## 2024-02-26 NOTE — PROGRESS NOTES
on 2/14/2024 at 10.7 but has increased again at 14.9 on 2/26/2024.  Her sedimentation rate is elevated at 52 indicating some inflammation in her body.  Her carboxyhemoglobin level is elevated at 2.3% she reports that she stopped smoking cigarettes on the day of her stroke in January but still vapes daily.  Intermittent leukocytosis likely related to inflammation as well as smoking and recurrent infections.  Last bout of pneumonia was 2 weeks ago.  She states she gets recurrent pneumonia.  No need for follow-up in our office okay for PCP to trend.  Patient verbalizes understanding.    Electronically signed by DREA Chakraborty CNP on 2/28/2024 at 1:40 PM

## 2024-02-28 ENCOUNTER — HOSPITAL ENCOUNTER (OUTPATIENT)
Dept: SPEECH THERAPY | Age: 44
Setting detail: THERAPIES SERIES
End: 2024-02-28
Payer: COMMERCIAL

## 2024-11-18 ENCOUNTER — HOSPITAL ENCOUNTER (EMERGENCY)
Age: 44
Discharge: HOME | End: 2024-11-18

## 2024-11-18 ENCOUNTER — APPOINTMENT (OUTPATIENT)
Dept: GENERAL RADIOLOGY | Age: 44
End: 2024-11-18

## 2024-11-18 VITALS
SYSTOLIC BLOOD PRESSURE: 141 MMHG | DIASTOLIC BLOOD PRESSURE: 99 MMHG | HEART RATE: 95 BPM | RESPIRATION RATE: 16 BRPM | TEMPERATURE: 98.8 F | WEIGHT: 195 LBS | OXYGEN SATURATION: 98 % | BODY MASS INDEX: 33.47 KG/M2

## 2024-11-18 DIAGNOSIS — J02.9 ACUTE PHARYNGITIS, UNSPECIFIED ETIOLOGY: ICD-10-CM

## 2024-11-18 DIAGNOSIS — B34.9 VIRAL SYNDROME: Primary | ICD-10-CM

## 2024-11-18 LAB
FLUAV RNA RESP QL NAA+PROBE: NOT DETECTED
FLUBV RNA RESP QL NAA+PROBE: NOT DETECTED
S PYO AG THROAT QL: NEGATIVE
S PYO THROAT QL CULT: NORMAL
SARS-COV-2 RNA RESP QL NAA+PROBE: NOT DETECTED

## 2024-11-18 PROCEDURE — 71046 X-RAY EXAM CHEST 2 VIEWS: CPT

## 2024-11-18 PROCEDURE — 87880 STREP A ASSAY W/OPTIC: CPT

## 2024-11-18 PROCEDURE — 99284 EMERGENCY DEPT VISIT MOD MDM: CPT

## 2024-11-18 PROCEDURE — 87636 SARSCOV2 & INF A&B AMP PRB: CPT

## 2024-11-18 PROCEDURE — 87070 CULTURE OTHR SPECIMN AEROBIC: CPT

## 2024-11-18 RX ORDER — FLUTICASONE PROPIONATE 50 MCG
1 SPRAY, SUSPENSION (ML) NASAL DAILY
Qty: 32 G | Refills: 1 | Status: SHIPPED | OUTPATIENT
Start: 2024-11-18

## 2024-11-18 ASSESSMENT — PAIN SCALES - GENERAL: PAINLEVEL_OUTOF10: 8

## 2024-11-18 ASSESSMENT — PAIN DESCRIPTION - LOCATION: LOCATION: THROAT

## 2024-11-18 ASSESSMENT — PAIN - FUNCTIONAL ASSESSMENT: PAIN_FUNCTIONAL_ASSESSMENT: 0-10

## 2024-11-18 NOTE — ED PROVIDER NOTES
[facility] EMERGENCY DEPT      EMERGENCY MEDICINE     Pt Name: Ivy Ruelas  MRN: [id number]  Birthdate 1980  Date of evaluation: 11/18/2024  Provider: Yumiko Reid PA-C    CHIEF COMPLAINT       Chief Complaint   Patient presents with    Generalized Body Aches    Sore Throat    Fever     HISTORY OF PRESENT ILLNESS   Ivy Ruelas is a pleasant 44 y.o. female who presents to the emergency department from from home, by private vehicle for evaluation of generalized body aches, sore throat, and fever. Patient states she has had body aches/chills, sore throat and fever for the last 3 days. Reports highest recorded temperature 102 farenheit. States tylenol has provided minimal relief. Reports that salt water rinse, vicks numbing spray, throat lozenges, and Nyquil/dayquil has not helped. She reports that it is painful to swallow and she has had a productive cough. Notes brother was recently diagnosed with strep throat and  was recently sick. Hx of asthma as an adolescent. Quit smoking in January of this year after a 30 year smoking history. Denies rhinorrhea, ear drainage, hearing or vision changes, wheezing, shortness of breath, and chest pain.      PASTMEDICAL HISTORY     Past Medical History:   Diagnosis Date    Asthma     Gout     HLD (hyperlipidemia) 01/03/2024    IBS (irritable bowel syndrome)     MDRO (multiple drug resistant organisms) resistance 01/01/2013    left great toe    Vertigo        Patient Active Problem List   Diagnosis Code    Diverticulitis K57.92    Hx of leukocytosis Z86.2    Acute hypokalemia E87.6    Nicotine dependence F17.200    History of asthma Z87.09    History of seizure Z87.898    Tachycardia R00.0    Generalized abdominal pain R10.84    GERD (gastroesophageal reflux disease) K21.9    Colitis K52.9    Acute asthma exacerbation J45.901    Tobacco abuse Z72.0    Hypokalemia E87.6    Hypomagnesemia E83.42    Fever R50.9    Shortness of breath R06.02    Sepsis without

## 2024-11-18 NOTE — ED TRIAGE NOTES
Presents to ED with c/o sore throat, fever, and body aches that started 3 days ago. Alert and oriented. Respirations easy and unlabored.

## 2024-11-18 NOTE — DISCHARGE INSTRUCTIONS
Take your medication as indicated and prescribed.      For pain use acetaminophen (Tylenol) or ibuprofen (Motrin / Advil), unless prescribed medications that have acetaminophen or ibuprofen (or similar medications) in it.  You can take over the counter acetaminophen tablets (1 - 2 tablets of the 500-mg strength every 6 hours) or ibuprofen tablets (2 tablets every 4 hours).     You can also use Cepacol lozenge or Chloraseptic spray to help soothe your throat.    If you were diagnosed with strep throat, make sure that you throw your toothbrush away.    PLEASE RETURN TO THE EMERGENCY DEPARTMENT IMMEDIATELY for worsening symptoms, difficulty with swallowing foods or liquids, shortness of breath, wheezing, change in your voice, or if you develop any concerning symptoms such as: high fever not relieved by acetaminophen (Tylenol) and/or ibuprofen (Motrin / Advil), chills, shortness of breath, chest pain, feeling of your heart fluttering or racing, persistent nausea and/or vomiting, vomiting up blood, blood in your stool, loss of consciousness, numbness, weakness or tingling in the arms or legs or change in color of the extremities, changes in mental status, persistent headache, blurry vision, loss of bladder / bowel control, unable to follow up with your physician, or other any other care or concern.   
Don Luis

## 2024-11-20 LAB — BACTERIA THROAT AEROBE CULT: NORMAL

## 2025-05-04 NOTE — PLAN OF CARE
Problem: Discharge Planning  Goal: Discharge to home or other facility with appropriate resources  1/4/2024 0027 by Sera King RN  Outcome: Progressing  Flowsheets (Taken 1/2/2024 2245 by Clare Duke RN)  Discharge to home or other facility with appropriate resources:   Identify barriers to discharge with patient and caregiver   Arrange for needed discharge resources and transportation as appropriate       Problem: Neurosensory - Adult  Goal: Achieves stable or improved neurological status  1/4/2024 0027 by Sera King RN  Outcome: Progressing  Flowsheets (Taken 1/3/2024 0154 by Clare Duke RN)  Achieves stable or improved neurological status:   Assess for and report changes in neurological status   Initiate measures to prevent increased intracranial pressure     Problem: Musculoskeletal - Adult  Goal: Return mobility to safest level of function  1/4/2024 0027 by Sera King RN  Outcome: Progressing  Flowsheets (Taken 1/3/2024 0154 by Clare Duke RN)  Return Mobility to Safest Level of Function:   Assess patient stability and activity tolerance for standing, transferring and ambulating with or without assistive devices   Assist with transfers and ambulation using safe patient handling equipment as needed     Problem: Infection - Adult  Goal: Absence of infection at discharge  Outcome: Progressing  Flowsheets (Taken 1/3/2024 0154 by Clare Duke RN)  Absence of infection at discharge:   Assess and monitor for signs and symptoms of infection   Monitor lab/diagnostic results     Problem: Metabolic/Fluid and Electrolytes - Adult  Goal: Electrolytes maintained within normal limits  1/4/2024 0027 by Sera King RN  Outcome: Progressing  Flowsheets (Taken 1/3/2024 0154 by Clare Duke RN)  Electrolytes maintained within normal limits: Monitor labs and assess patient for signs and symptoms of electrolyte imbalances     Problem: Skin/Tissue Integrity  Goal: Absence of new  [FreeTextEntry1] : 89 year old F with HTN who presents for f/u.   1) Palpitation, - F/U 1 week Holter result - Pt underwent TTE 5/2/25 which showed normal LVEF 65-70%, normal RV size/function, mild-moderate AI, moderate TR and mild pHTN (PASP 43 mmHg, was already borderline-mild in 2021 TTE) - She is euvolemic on exam without SOB or LE edema. Patient denies any prior pulmonary history. I advised pt to continue monitor symptoms for now and keep BP under good control.  2) HTN - Continue Edarbi 40 and amlodipine 5 daily   3) Follow-up, pending Holter, otherwise annually or sooner if symptomatic skin breakdown  Description: 1.  Monitor for areas of redness and/or skin breakdown  2.  Assess vascular access sites hourly  3.  Every 4-6 hours minimum:  Change oxygen saturation probe site  4.  Every 4-6 hours:  If on nasal continuous positive airway pressure, respiratory therapy assess nares and determine need for appliance change or resting period.  1/4/2024 0027 by Sera King RN  Outcome: Progressing     Problem: Safety - Adult  Goal: Free from fall injury  1/4/2024 0027 by Sera King RN  Outcome: Progressing  Flowsheets (Taken 1/4/2024 0027)  Free From Fall Injury:   Instruct family/caregiver on patient safety   Based on caregiver fall risk screen, instruct family/caregiver to ask for assistance with transferring infant if caregiver noted to have fall risk factors     Problem: Chronic Conditions and Co-morbidities  Goal: Patient's chronic conditions and co-morbidity symptoms are monitored and maintained or improved  1/4/2024 0027 by Sera King RN  Outcome: Progressing  Flowsheets (Taken 1/4/2024 0027)  Care Plan - Patient's Chronic Conditions and Co-Morbidity Symptoms are Monitored and Maintained or Improved:   Monitor and assess patient's chronic conditions and comorbid symptoms for stability, deterioration, or improvement   Collaborate with multidisciplinary team to address chronic and comorbid conditions and prevent exacerbation or deterioration   Update acute care plan with appropriate goals if chronic or comorbid symptoms are exacerbated and prevent overall improvement and discharge     Care plan reviewed with patient and family.  Family verbalizes understanding of the plan of care and contribute to goal setting.